# Patient Record
Sex: MALE | Race: WHITE | NOT HISPANIC OR LATINO | Employment: UNEMPLOYED | URBAN - METROPOLITAN AREA
[De-identification: names, ages, dates, MRNs, and addresses within clinical notes are randomized per-mention and may not be internally consistent; named-entity substitution may affect disease eponyms.]

---

## 2024-01-23 ENCOUNTER — TRANSFERRED RECORDS (OUTPATIENT)
Dept: HEALTH INFORMATION MANAGEMENT | Facility: CLINIC | Age: 39
End: 2024-01-23

## 2024-02-06 ENCOUNTER — MEDICAL CORRESPONDENCE (OUTPATIENT)
Dept: HEALTH INFORMATION MANAGEMENT | Facility: CLINIC | Age: 39
End: 2024-02-06
Payer: COMMERCIAL

## 2024-02-07 ENCOUNTER — CARE COORDINATION (OUTPATIENT)
Dept: GASTROENTEROLOGY | Facility: CLINIC | Age: 39
End: 2024-02-07
Payer: COMMERCIAL

## 2024-02-07 ENCOUNTER — TRANSCRIBE ORDERS (OUTPATIENT)
Dept: OTHER | Age: 39
End: 2024-02-07

## 2024-02-07 ENCOUNTER — DOCUMENTATION ONLY (OUTPATIENT)
Dept: GASTROENTEROLOGY | Facility: CLINIC | Age: 39
End: 2024-02-07
Payer: COMMERCIAL

## 2024-02-07 DIAGNOSIS — K86.1 CHRONIC RECURRENT PANCREATITIS (H): Primary | ICD-10-CM

## 2024-02-07 NOTE — PROGRESS NOTES
Called to request images be pushed to [employer] PACS.    Images Requested:  -- CT ABDOMEN PELVIS WO (01/23/2024 3:26 PM CST)  -- CT ABDOMEN PELVIS WO (01/03/2024 7:43 AM CST)  -- MR ABDOMEN MRCP WO (09/08/2023 12:22 PM CDT)    Facility Information:  [facility]   [address] [place] [place] [facility]", [place] [address]   Phone #: [phone]       SK

## 2024-02-07 NOTE — PROGRESS NOTES
[facility] referral review:    Patient with history of acute on chronic pancreatitis    ERCP's mentioned in ER note from 1-29-24 but none seen per records since 2020    Ok for clinic with Dr. Lay      ER visit 1-29-24  hypertension, recurrent pancreatitis, pancreatic pseudocyst, depression, anxiety. Patient has had multiple recent hospitalization for epigastric pain, recurrent pancreatitis. History of gallstone pancreatitis that started in 2019, complicated with pancreatic pseudocyst which she was drained in 2020. Due to episodes of recurrent pancreatitis underwent cystogastrostomy and stent placement and July 2023 followed by ERCP for stent removal. Chart review suggested patient did have fairly significant strictures during ERCP. This is third hospitalization in January 2024. Patient was admitted on 1/3/2024 and subsequently on 1/23/2024 and discharged to home on 1/25/2024. Patient reported that he was doing well until today when he had some mac & cheese after which he started having severe epigastric abdominal pain, bloating sensation. Denies any fever or chills. Denies alcohol use.     Lipase was minimally elevated. GI was consulted though recommended medical management and no further imaging at this point. The patient was treated conservatively and his diet was advanced successfully. The patient has an appointment with an outpatient Pain management service. He was discharged with 5 tabs of 5mg Oxycodone as he had run out. Follow up with primary physician in 3-4 days.       Per referring provider clinic note:  Patient presenting today for follow-up of chronic recurrent pancreatitis. I have placed a referral to the [facility] to see if he can get a second opinion for further interventions that may be helpful for him. I have strongly urged him to continue eating gentle foods and avoid foods that are high in excess carbohydrates or fats. Will plan to follow-up as needed for his symptoms. Pain  clinic consultation already in place. Continue increasing gabapentin as tolerated.    Component 01/30/24 01/23/24 01/03/24 06/12/23 04/26/23 03/08/23   LIPASE 13.0-60.0 unit(s)/L 82.0 High  191.0 High  163.0 High  27.0 41.0 47.5     Prior elevations lipase in 2019    CT A/p 1-23-24  1.  Previously seen pseudocyst involving the distal body/tail of the pancreas has decreased in size. However there are persistent inflammatory changes adjacent to the distal body and tail of the pancreas similar to most recent exam consistent with persistent or recurrent pancreatitis.   2.  Cholecystectomy.   3.  Tiny nonobstructing left renal stone.   4.  Colonic diverticulosis.   5.  Multiple stable varices left upper abdomen.     MRCP 9/8/23  Impression    1.  Interval decrease in the size of the cystic lesion in the pancreatic tail, which now measures up to 2.2 cm compared to prior 3.7 cm.  2.  Findings to suggest acute pancreatitis at this time.  3.  Unremarkable appearance of the pancreaticobiliary ducts.    EUS w/ cystgastrostomy 2019  Findings:       ENDOSCOPIC FINDING: :        Extrinsic compression on the stomach was found in the gastric fundus.        The examined duodenum was normal.        ENDOSONOGRAPHIC FINDING: :        Evidence of a previous cholecystectomy was identified        endosonographically.        An anechoic lesion suggestive of a pseudocyst was identified in the        pancreatic tail. It does not communicate with the pancreatic duct. The        lesion measured 100 mm by 100 mm in maximal cross-sectional diameter.        There was a single compartment without septae. The outer wall of the        lesion was thick. There was no associated mass. There was no internal        debris within the fluid-filled cavity. The decision was made to create a        cystogastrostomy using the AXIOS stent system. Once an appropriate        position in the stomach was identified, the common wall between the        stomach and  the cyst was interrogated utilizing color Doppler imaging to        identify interposed vessels. The stomach wall and the cyst were        punctured under endosonographic guidance. A wire was inserted into the        cyst under fluoroscopic guidance. The AXIOS stent and electrocautery        device was introduced through the working channel and advanced over the        guidewire. Current was applied to the cautery tip and then used to        increase the diameter of the stoma. The AXIOS device was advanced into        the cyst, and a 15 x 10 mm AXIOS stent was placed with the flanges in        close approximation to the walls of the cyst and the stomach through the        cystogastrostomy. The stent was successfully placed.     Impressions/Post-Op Diagnosis:        - Extrinsic compression in the gastric fundus.        - Normal examined duodenum.        - Evidence of a cholecystectomy.        - A 103 mm by 100 mm pseudocyst was seen in the area of pancreatic tail.        Tissue has not been obtained. However, the endosonographic appearance is        consistent with a pancreatic pseudocyst.        - Cystogastrostomy was performed with placement of a 15 x 10 mm AXIOS        LAMS.        - No specimens collected

## 2024-03-13 ENCOUNTER — TRANSFERRED RECORDS (OUTPATIENT)
Dept: HEALTH INFORMATION MANAGEMENT | Facility: CLINIC | Age: 39
End: 2024-03-13

## 2024-03-14 ENCOUNTER — TRANSFERRED RECORDS (OUTPATIENT)
Dept: HEALTH INFORMATION MANAGEMENT | Facility: CLINIC | Age: 39
End: 2024-03-14

## 2024-04-09 ENCOUNTER — TRANSFERRED RECORDS (OUTPATIENT)
Dept: HEALTH INFORMATION MANAGEMENT | Facility: CLINIC | Age: 39
End: 2024-04-09

## 2024-04-18 ENCOUNTER — TRANSFERRED RECORDS (OUTPATIENT)
Dept: HEALTH INFORMATION MANAGEMENT | Facility: CLINIC | Age: 39
End: 2024-04-18
Payer: COMMERCIAL

## 2024-05-08 ENCOUNTER — DOCUMENTATION ONLY (OUTPATIENT)
Dept: GASTROENTEROLOGY | Facility: CLINIC | Age: 39
End: 2024-05-08

## 2024-05-08 ENCOUNTER — TELEPHONE (OUTPATIENT)
Dept: GASTROENTEROLOGY | Facility: CLINIC | Age: 39
End: 2024-05-08

## 2024-05-08 ENCOUNTER — OFFICE VISIT (OUTPATIENT)
Dept: GASTROENTEROLOGY | Facility: CLINIC | Age: 39
End: 2024-05-08
Payer: COMMERCIAL

## 2024-05-08 VITALS
WEIGHT: 268 LBS | HEIGHT: 72 IN | OXYGEN SATURATION: 97 % | BODY MASS INDEX: 36.3 KG/M2 | SYSTOLIC BLOOD PRESSURE: 111 MMHG | DIASTOLIC BLOOD PRESSURE: 77 MMHG | HEART RATE: 75 BPM

## 2024-05-08 DIAGNOSIS — K86.1 CHRONIC RECURRENT PANCREATITIS (H): ICD-10-CM

## 2024-05-08 PROCEDURE — 99204 OFFICE O/P NEW MOD 45 MIN: CPT | Performed by: INTERNAL MEDICINE

## 2024-05-08 RX ORDER — CARVEDILOL 25 MG/1
50 TABLET ORAL 2 TIMES DAILY WITH MEALS
COMMUNITY
Start: 2022-06-27

## 2024-05-08 RX ORDER — PROCHLORPERAZINE 25 MG/1
SUPPOSITORY RECTAL
COMMUNITY
Start: 2023-06-21

## 2024-05-08 RX ORDER — MULTIVITAMIN,THER AND MINERALS
1 TABLET ORAL DAILY
COMMUNITY

## 2024-05-08 RX ORDER — POLYETHYLENE GLYCOL 3350 17 G/17G
17 POWDER, FOR SOLUTION ORAL DAILY PRN
COMMUNITY
Start: 2023-01-27

## 2024-05-08 RX ORDER — ONDANSETRON 4 MG/1
4 TABLET, ORALLY DISINTEGRATING ORAL EVERY 8 HOURS PRN
COMMUNITY
Start: 2023-06-29

## 2024-05-08 RX ORDER — ISOSORBIDE MONONITRATE 30 MG/1
90 TABLET, EXTENDED RELEASE ORAL AT BEDTIME
COMMUNITY
Start: 2022-06-27

## 2024-05-08 RX ORDER — AMLODIPINE BESYLATE 10 MG/1
1 TABLET ORAL DAILY
COMMUNITY
Start: 2022-06-27

## 2024-05-08 RX ORDER — MAGNESIUM HYDROXIDE/ALUMINUM HYDROXICE/SIMETHICONE 120; 1200; 1200 MG/30ML; MG/30ML; MG/30ML
15 SUSPENSION ORAL EVERY 4 HOURS PRN
COMMUNITY

## 2024-05-08 RX ORDER — PREGABALIN 150 MG/1
1 CAPSULE ORAL 3 TIMES DAILY
COMMUNITY
Start: 2024-04-29

## 2024-05-08 RX ORDER — PROCHLORPERAZINE 25 MG/1
SUPPOSITORY RECTAL
COMMUNITY
Start: 2023-06-27

## 2024-05-08 RX ORDER — DULOXETIN HYDROCHLORIDE 60 MG/1
60 CAPSULE, DELAYED RELEASE ORAL DAILY
COMMUNITY
Start: 2024-03-18

## 2024-05-08 RX ORDER — LIDOCAINE 4 G/G
1-3 PATCH TOPICAL
COMMUNITY

## 2024-05-08 RX ORDER — HYDRALAZINE HYDROCHLORIDE 100 MG/1
100 TABLET, FILM COATED ORAL 3 TIMES DAILY
COMMUNITY
Start: 2022-06-27

## 2024-05-08 RX ORDER — PANTOPRAZOLE SODIUM 40 MG/1
40 TABLET, DELAYED RELEASE ORAL 2 TIMES DAILY WITH MEALS
COMMUNITY
Start: 2024-04-24

## 2024-05-08 RX ORDER — ACETAMINOPHEN 500 MG
1000 TABLET ORAL EVERY 8 HOURS PRN
COMMUNITY
Start: 2023-01-26

## 2024-05-08 ASSESSMENT — PAIN SCALES - GENERAL: PAINLEVEL: MODERATE PAIN (4)

## 2024-05-08 NOTE — NURSING NOTE
Chief Complaint   Patient presents with    New Patient       Vitals:    05/08/24 0820   BP: 111/77   Pulse: 75   SpO2: 97%   Weight: 121.6 kg (268 lb)   Height: 1.829 m (6')       Body mass index is 36.35 kg/m .    Marielos Marx

## 2024-05-08 NOTE — PROGRESS NOTES
Per Yeimi GARY RN: Request records from [place]     Clinic Info: [facility]  [address], [place] [address]     Phone: [phone]   [facility] HIM Direct Fax: [phone]    Records requested on 5/8 at 1:00pm.     Marielos Marx

## 2024-05-08 NOTE — PROGRESS NOTES
Abiel is a very pleasant 39-year-old referred by Dr. Garcia for recurrent acute and chronic pancreatitis.  He is accompanied by his younger brother and his mother who are very invested in his care.  He has a long medical history including congestive heart failure diabetes CKD but had his first episode of acute pancreatitis related to gallstones in 2019.  That was complicated by walled off necrosis treated by [facility] with a lumen opposing medical stent also an ERCP do not have the details.  He has had multiple subsequent admissions for acute on chronic pancreatitis and a pseudocyst.  At last intervention Dr. Castillo attempted to traverse the stricture was able to get a 021 wire through it but no dilator which is typical of most commercially available dilators with tight strictures.  Since then the patient has had intractable pain that is on a good day is 4 out of 10 on a bad a 9-10 out of 10.  He is managed thoroughly medically by [facility] and is on neuromodulators Cymbalta Tylenol but no opioids.  At some point he had a fecal elastase but we cannot get the results but has never been tried on pancreatic enzymes.  He is on a very minimal diet and has not seen a dietitian he is slowly losing weight who is still morbidly obese at 268 pounds BMI 36.3.  He has nikolai colored and inconsistent stools that may or may not represent exocrine pancreatic insufficiency.  We do have his latest CT uploaded and its without contrast from January due to his CKD.  It does show fairly intact pancreas without calcifications or visible duct up to the tail which shows a small pseudocyst and/or resolved walled off necrosis.    On physical exam he appears in pain and is tender in the left upper abdomen and mid abdomen but not the right.    We spent 45 minutes in person and reviewing data and imaging.  He had a sentinel event of acute biliary pancreatitis with necrosis or pseudocyst not really clear that was drained and has had  some attempts at stent therapy trans papillary.  He now apparently has a high-grade stricture which may be related to the pancreatitis or to prior stents and a relatively normal duct.  In any rate there may be some component of duct obstruction contributing to his pain.  He also may have exocrine pancreatic insufficiency and has not had dietary consultation.  Therefore, recommend the following including a secretin MRCP to look at his duct morphology and  Fairly confident that if he has a stricture we can get an 018 wire and an angioplasty balloon through it to see if decompression helps his pain.  The that may or may not be effective however I do not think any surgery is likely to help him.  Distal pancreatectomy's for almost inevitably resulted in worsening diabetes a splenectomy and do not really chronic pain in addition to the risks of duct leak and other complications of the surgery.  He is not a candidate for total pancreatectomy.  If we will try him on enzymes regardless of his elastase get him to his diet our pancreas dietitian and then repeated in elastase for documentation.  After the MRCP I will follow-up with him and see what snacks.  If all fails we will refer him to interventional pain center      1) get results of elastase  2) trial ezymes Creon 24K 1 increase to 3 w meals  3) dietitian  4) secretin MRCP  5) repeat elastase here  6) follow-up 6 weeks          Component  Ref Range & Units 3 mo ago   HEMOGLOBIN A1C MONITORING (POCT)  <=6.4 % 5.5       7/2023  DISCHARGE DIAGNOSES:  Postprocedural ERCP/transpapillary drainage pain   History of recurrent pancreatitis     HOSPITAL COURSE:  37 y/o M with an recurrent pancreatitis, pancreatic pseudocyst requiring drainage in 2019 with improvement in symptoms, HTN, DM, CKD 3, GERD, recently hospitalized at [facility] 6/12 to 6/16 with severe abdominal pain, nausea, found to have increase in size pancreatic tail pseudocyst with adjacent inflammatory changes  of pancreatitis, treated with bowel rest, IVF, DC'd to have elective outpatient ERCP/transpapillary drainage with GI, underwent the procedure on 7/6, complicated by postprocedural pain.     Initial episode began in 2019 with gallstone pancreatitis, but had multiple recurrent hospitalizations since 12/22 for acute on chronic GI symptoms and eventually felt ERCP with transpapillary drainage may be helpful from his most recent hospitalization. On 7/6, underwent cystogastrostomy using an AXIOS and a coaxial double-pigtail stent with removal of pus and debris from pseudocyst. He will need ERCP in 2 weeks to remove the PD stent and the AXIOS. He continues to have intense postprocedural pain. Lipase was mildly elevated, felt it may be due to pancreatic manipulation during ERCP per GI.     His diet was gradually advanced and pain was eventually controlled. On day of DC, he did well with regular diet. His pain is much improved. He requested some oral pain medications to go home with, which was provided. No fevers, chills, dyspnea, nausea or vomiting. He is requesting be discharged and was sent home in stable condition. He agreed to follow-up with GI.    See below for secondary problems  # HTN  Is now better controlled with PTA amlodipine, Imdur, Coreg, hydralazine p.o.,    # DM  Hold PTA metformin. Insulin sliding scale     # CKD 3  Avoid renal insults      Past Medical History:   . Date   CHF (congestive heart failure) (HC)   Diabetes mellitus type 2 in obese (HC)   GERD (gastroesophageal reflux disease)   Lung nodule   finished f/u with outside provider   Non-ischemic cardiomyopathy (HC)   h/o reduced EF, recovered in follow up   KAYLA (obstructive sleep apnea)   Pancreatic pseudocyst   s/p stent placement   Secondary hyperparathyroidism (HC)     Past Surgical History:   . Laterality Date   ENDOSCOPIC ULTRASOUND 10/29/2019   [place]   GALLBLADDER SURGERY 2020   LAP CHOLECYSTECTOMY 07/16/2019   umbilical hernia repair also/  Dr. Hutchinson   [place] [facility] STENT PLACEMENT BILIARY/PANCREATIC DUCT 2019   UPPER ENDOSCOPIC ULTRASOUND W/ FNA 2019   Dr. Davis     Family History   Problem Relation Age of Onset   Depression Mother   Anxiety disorder Mother   Lung cancer Mother   Asthma Mother   COPD Mother   Fibromyalgia Father   Crohn's disease Brother   Rheumatic fever Brother     Social History     Tobacco Use   Smoking status: Former   Current packs/day: 0.00   Types: Cigarettes   Start date: 3/11/2005   Quit date: 3/11/2007   Years since quittin.0   Passive exposure: Never   Smokeless tobacco: Never   Vaping Use   Vaping status: Former   Substances: THC, CBD   Devices: Disposable, Pre-filled pod   Substance Use Topics   Alcohol use: No   Comment: Quit drinking in    Drug use: Yes   Types: Marijuana   Comment: Almost daily, patient has a medical marijuana card     Current Outpatient Medications   Medication Sig Dispense Refill   acetaminophen (TYLENOL EXTRA STRGTH) 500 mg tablet Take 2 Tablets (1,000 mg) by mouth four times daily. Max acetaminophen dose: 4000mg in 24 hrs. 0   albuterol HFA 90 mcg/actuation inhaler Inhale 2 Puffs by mouth every 4 hours if needed. 18 g 0   amitriptyline (ELAVIL) 25 mg tablet Take 25 mg by mouth at bedtime.   amLODIPine (NORVASC) 10 mg tablet Take 1 Tablet (10 mg) by mouth once daily. 90 Tablet 3   carvediloL (COREG) 25 mg tablet Take 2 Tablets (50 mg) by mouth two times daily with meals. 360 Tablet 3   DULoxetine (CYMBALTA) 60 mg Delayed-release capsule Take 1 Capsule (60 mg) by mouth once daily. 90 Capsule 3   hydrALAZINE (APRESOLINE) 100 mg tablet Take 1 Tablet (100 mg) by mouth three times daily. 270 Tablet 3   isosorbide mononitrate (IMDUR) 30 mg extended release tablet 24 Hour Take 3 Tablets (90 mg) by mouth once daily. 270 Tablet 3   lidocaine 4 % topical patch Apply 1-3 Patches on dry, clean, hairless skin once daily if needed for Pain. Apply to intact skin to cover most painful area for max  12hr per 24hr period.   metFORMIN (GLUCOPHAGE) 1,000 mg tablet Take 1 Tablet (1,000 mg) by mouth two times daily with meals. 180 Tablet 0   multivitamin,ther and minerals (MULTIVITAMIN-MINERALS THERAPEUTIC) tablet Take 1 tablet by mouth once daily.   ondansetron (ZOFRAN ODT) 4 mg disintegrating tablet Place 1 Tablet (4 mg) on the tongue every 6 hours if needed for Nausea/Vomiting. 10 Tablet 0   pantoprazole (PROTONIX) 40 mg delayed-release tablet Take 1 Tablet (40 mg) by mouth two times daily before meals. 90 Tablet 3   polyethylene glycol (MIRALAX; GLYCOLAX) 17 g packet Take 17 g by mouth or nasogastric tube once daily if needed for Constipation. 0   pregabalin (LYRICA) 100 mg capsule Take 1 Capsule (100 mg) by mouth three times daily. 90 Capsule 0     No current facility-administered medications for this visit.     Medications have been reviewed by me and are current to the best of my knowledge and ability.    Allergies   Allergen Reactions   Iodine Hives and Itching   Pt allergic to iodine/ shellfish , contrast dyes.   Trazodone Other - Describe In Comment Field   priapsim   Amoxicillin *Unknown - Childhood Rxn   Citrus And Derivatives Nausea Only   Contrast Dye [Iodinated Contrast Media] Renal Failure   Do not use contrast dyes due to kidney failure/ckd   Shell Fish [Shellfish Containing Products] GI Upset     ROS: The remainder of review of systems was negative.    EXAM:   There were no vitals filed for this visit.  GEN: awake and talkative; appears well-nourished, NAD  HEENT: Speech clear, hearing intact  RESP: Breathing non labored    QOL score: 8/10    TREATMENTS TRIED: gabapentin, tylenol, cymbalta, elavil, surgery, lyrica    ASSESSMENT AND PLAN:  ICD-10-CM   1. Chronic recurrent pancreatitis (HC) K86.1 pregabalin (LYRICA) 100 mg capsule     2. Chronic abdominal pain R10.9 pregabalin (LYRICA) 100 mg capsule   G89.29     3. Neuropathic pain M79.2 pregabalin (LYRICA) 100 mg capsule     4. Medication  management Z79.899     5. Chronic pain syndrome G89.4     Yeimi Goodwin, RN   Registered Nurse     Progress Notes  Signed     Encounter Date: 2/7/2024       [facility] referral review:     Patient with history of acute on chronic pancreatitis     ERCP's mentioned in ER note from 1-29-24 but none seen per records since 2020     Ok for clinic with Dr. Lay        ER visit 1-29-24  hypertension, recurrent pancreatitis, pancreatic pseudocyst, depression, anxiety. Patient has had multiple recent hospitalization for epigastric pain, recurrent pancreatitis. History of gallstone pancreatitis that started in 2019, complicated with pancreatic pseudocyst which she was drained in 2020. Due to episodes of recurrent pancreatitis underwent cystogastrostomy and stent placement and July 2023 followed by ERCP for stent removal. Chart review suggested patient did have fairly significant strictures during ERCP. This is third hospitalization in January 2024. Patient was admitted on 1/3/2024 and subsequently on 1/23/2024 and discharged to home on 1/25/2024. Patient reported that he was doing well until today when he had some mac & cheese after which he started having severe epigastric abdominal pain, bloating sensation. Denies any fever or chills. Denies alcohol use.     Lipase was minimally elevated. GI was consulted though recommended medical management and no further imaging at this point. The patient was treated conservatively and his diet was advanced successfully. The patient has an appointment with an outpatient Pain management service. He was discharged with 5 tabs of 5mg Oxycodone as he had run out. Follow up with primary physician in 3-4 days.        Per referring provider clinic note:  Patient presenting today for follow-up of chronic recurrent pancreatitis. I have placed a referral to the [facility] to see if he can get a second opinion for further interventions that may be helpful for him. I have strongly  urged him to continue eating gentle foods and avoid foods that are high in excess carbohydrates or fats. Will plan to follow-up as needed for his symptoms. Pain clinic consultation already in place. Continue increasing gabapentin as tolerated.     Component 01/30/24 01/23/24 01/03/24 06/12/23 04/26/23 03/08/23   LIPASE 13.0-60.0 unit(s)/L 82.0 High  191.0 High  163.0 High  27.0 41.0 47.5     Prior elevations lipase in 2019     CT A/p 1-23-24  1.  Previously seen pseudocyst involving the distal body/tail of the pancreas has decreased in size. However there are persistent inflammatory changes adjacent to the distal body and tail of the pancreas similar to most recent exam consistent with persistent or recurrent pancreatitis.   2.  Cholecystectomy.   3.  Tiny nonobstructing left renal stone.   4.  Colonic diverticulosis.   5.  Multiple stable varices left upper abdomen.      MRCP 9/8/23  Impression     1.  Interval decrease in the size of the cystic lesion in the pancreatic tail, which now measures up to 2.2 cm compared to prior 3.7 cm.  2.  Findings to suggest acute pancreatitis at this time.  3.  Unremarkable appearance of the pancreaticobiliary ducts.    GI Symptoms or Concerns38-year-old gentleman with a past medical history significant for acute recurrent pancreatitis now been regular follow-up. He initially developed a pseudocyst in 2019 for which on she underwent endoscopic transmural drainage. This has resolved and he did well until earlier this year when he developed recurrent episodes of pancreatitis requiring virtually constant hospitalizations. Imaging showed a fluid collection in the tail of the pancreas which was relatively unchanged over multiple imaging studies. Conservative therapies fail then ultimately patient underwent endoscopic ultrasound guided drainage of the of the fluid collection. On subsequently, the patient has felt much better with significant improvement in pain and no further  hospitalizations since June of this year. He was hospitalized for approximately 2 days after that procedure. In addition, an ERCP was performed to evaluate for pancreatic duct leak. There was no extravasation of contrast from the head and distal body of the pancreas however the more proximal duct could not be filled. It did appear to be slightly dilated endoscopic ultrasound however advanced pancreatic duct access was not pursued as the patient&#39;s symptoms were felt to be most likely secondary to the fluid collection. After endoscopic transmural drainage of the fluid collection the patient felt better as mentioned above. He presented for removal of the stent in July of this year. At that point repeat ERCP was performed to attempt to traverse the stricture and although a guidewire could be advanced further into the pancreatic duct only minimal contrast could be injected and no device could be advanced across the stricture. Since then the patient states that overall he is improved. He has good days and bad days but overall is better. He has not been able to go back to work yet due to significant functional impairment during bad days. He has been feeling somewhat depressed. He has been having financial difficulties due to his multiple hospitalizations and procedures. Appetite has been relatively stable he denies any nausea or emesis other than if his pain becomes significantly worse. His bowel movements have been irregular. He is able to tolerate a fairly regular diet however.     Anatomical Region Laterality Modality   Abdomen -- Magnetic Resonance   LIVER -- --   PANCREAS -- --   KIDNEYS -- --   AORTA -- --     Impression    1.  Interval decrease in the size of the cystic lesion in the pancreatic tail, which now measures up to 2.2 cm compared to prior 3.7 cm.  2.  Findings to suggest acute pancreatitis at this time.  3.  Unremarkable appearance of the pancreaticobiliary ducts.  Narrative    For Patients: As a  result of the 21st Century Cures Act, medical imaging exams and procedure reports are released immediately into your electronic medical record. You may view this report before your referring provider. If you have questions, please contact your health care provider.    EXAM: MR ABDOMEN MRCP WO  LOCATION: [facility]  DATE: 9/8/2023    INDICATION: Recurrent Pancreatitis  COMPARISON: Multiple priors with the most recent dated 06/12/2023  TECHNIQUE: Routine MR liver/pancreas protocol including axial and coronal MRCP sequences. 2D and 3D reconstruction performed by MR technologist including MIP reconstruction and slab cholangiograms. If performed with contrast, additional dynamic T1 post IV contrast images.  CONTRAST: None.    FINDINGS:    MRCP: Absent gallbladder. No intrahepatic or extrahepatic biliary dilatation, stricture, focal filling defect. Pancreatic duct is decompressed and unremarkable.    LIVER: No morphologic changes of hepatic steatosis, iron deposition, or cirrhosis. Accounting for the noncontrast technique, no focal hepatic mass is identified.    PANCREAS: The unilocular cystic lesion in the pancreatic tail has decreased in size now measuring 2.2 x 1.8 cm, previously 3.7 x 3.7 cm (series 9, image 15). The lesion appears circumscribed and without thick internal septations or invasion of adjacent structures. No acute appearing peripancreatic inflammation. Accounting for the noncontrast technique, no solid pancreatic mass is identified elsewhere.    ADDITIONAL FINDINGS:  Clear visualized lung bases.    Unremarkable noncontrast appearance of the spleen and adrenal glands. There are several subcentimeter bilateral renal cysts, which are presumably benign and no follow-up is recommended. No large contour deforming renal mass. No hydronephrosis. No dilated loops of small bowel to suggest an obstruction.    Normal caliber abdominal aorta. Multiple left upper quadrant collaterals are identified, which may be  due to prior occlusion of the splenic vein. The main portal venous flow void is preserved on the T2-weighted imaging. No intra-abdominal ascites. No intra-abdominal lymphadenopathy by size criteria.

## 2024-05-08 NOTE — PATIENT INSTRUCTIONS
Follow up:    Dr. Lay has outlined the following steps after your recent clinic visit:    1) we will get labs from [facility] related to Fecal elastase    2) trial ezymes Creon 24K 1 increase to 3 w meal    3) dietitian visit    4) secretin MRCP    6) follow-up 6 weeks    Please call with any questions or concerns regarding your clinic visit today.    It is a pleasure being involved in your health care.    Contacts post-consultation depending on your need:    Schedule Clinic Appointments            [phone] # 1   M-F 7:30 - 5 pm    Yeimi Goodwin, RN Care Coordinator (Dr. Menendez/Dr. Lay)  [phone]    Paula Hernandez, RN Care Coordinator (Dr. Uribe)   [phone]    Alison Del Angel, RN Care Coordinator (Dr. Villafuerte/Dr. Hodge)  [phone]    [facility]  (all MDs)   [phone]      For urgent/emergent questions after business hours, you may reach the on-call GI Fellow by contacting the [facility]  at [phone].    How do I schedule labs, imaging studies, or procedures that were ordered in clinic today?     Labs: To schedule lab appointment at the [facility], use my chart or call [phone]. If you have a [facility] lab closer to home where you are regularly seen you can give them a call.     Procedures: If a colonoscopy, upper endoscopy, breath test, esophageal manometry, or pH impedence was ordered today, our endoscopy team will call you to schedule this. If you have not heard from our endoscopy team within a week, please call [phone] to schedule.     Imaging Studies: If you were scheduled for a CT scan, X-ray, MRI, ultrasound, HIDA scan or other imaging study, please call [phone] to have this scheduled.     Referral: If a referral to another specialty was ordered, expect a phone call or follow instructions above. If you have not heard from anyone regarding your referral in a week, please call our clinic to check the  status.     How to I schedule a follow-up visit?  If you did not schedule a follow-up visit today, please call [phone] to schedule a follow-up office visit.

## 2024-05-08 NOTE — LETTER
5/8/2024         RE: Abiel Weeks  [address]  [place] [address]        Dear Colleague,    Thank you for referring your patient, Abiel Weeks, to the [facility] [place]. Please see a copy of my visit note below.      Abiel is a very pleasant 39-year-old referred by Dr. Garcia for recurrent acute and chronic pancreatitis.  He is accompanied by his younger brother and his mother who are very invested in his care.  He has a long medical history including congestive heart failure diabetes CKD but had his first episode of acute pancreatitis related to gallstones in 2019.  That was complicated by walled off necrosis treated by [facility] with a lumen opposing medical stent also an ERCP do not have the details.  He has had multiple subsequent admissions for acute on chronic pancreatitis and a pseudocyst.  At last intervention Dr. Castillo attempted to traverse the stricture was able to get a 021 wire through it but no dilator which is typical of most commercially available dilators with tight strictures.  Since then the patient has had intractable pain that is on a good day is 4 out of 10 on a bad a 9-10 out of 10.  He is managed thoroughly medically by [facility] and is on neuromodulators Cymbalta Tylenol but no opioids.  At some point he had a fecal elastase but we cannot get the results but has never been tried on pancreatic enzymes.  He is on a very minimal diet and has not seen a dietitian he is slowly losing weight who is still morbidly obese at 268 pounds BMI 36.3.  He has nikolai colored and inconsistent stools that may or may not represent exocrine pancreatic insufficiency.  We do have his latest CT uploaded and its without contrast from January due to his CKD.  It does show fairly intact pancreas without calcifications or visible duct up to the tail which shows a small pseudocyst and/or resolved walled off necrosis.    On physical exam he  appears in pain and is tender in the left upper abdomen and mid abdomen but not the right.    We spent 45 minutes in person and reviewing data and imaging.  He had a sentinel event of acute biliary pancreatitis with necrosis or pseudocyst not really clear that was drained and has had some attempts at stent therapy trans papillary.  He now apparently has a high-grade stricture which may be related to the pancreatitis or to prior stents and a relatively normal duct.  In any rate there may be some component of duct obstruction contributing to his pain.  He also may have exocrine pancreatic insufficiency and has not had dietary consultation.  Therefore, recommend the following including a secretin MRCP to look at his duct morphology and  Fairly confident that if he has a stricture we can get an 018 wire and an angioplasty balloon through it to see if decompression helps his pain.  The that may or may not be effective however I do not think any surgery is likely to help him.  Distal pancreatectomy's for almost inevitably resulted in worsening diabetes a splenectomy and do not really chronic pain in addition to the risks of duct leak and other complications of the surgery.  He is not a candidate for total pancreatectomy.  If we will try him on enzymes regardless of his elastase get him to his diet our pancreas dietitian and then repeated in elastase for documentation.  After the MRCP I will follow-up with him and see what snacks.  If all fails we will refer him to interventional pain center      1) get results of elastase  2) trial ezymes Creon 24K 1 increase to 3 w meals  3) dietitian  4) secretin MRCP  5) repeat elastase here  6) follow-up 6 weeks          Component  Ref Range & Units 3 mo ago   HEMOGLOBIN A1C MONITORING (POCT)  <=6.4 % 5.5       7/2023  DISCHARGE DIAGNOSES:  Postprocedural ERCP/transpapillary drainage pain   History of recurrent pancreatitis     HOSPITAL COURSE:  37 y/o M with an recurrent  pancreatitis, pancreatic pseudocyst requiring drainage in 2019 with improvement in symptoms, HTN, DM, CKD 3, GERD, recently hospitalized at [facility] 6/12 to 6/16 with severe abdominal pain, nausea, found to have increase in size pancreatic tail pseudocyst with adjacent inflammatory changes of pancreatitis, treated with bowel rest, IVF, DC'd to have elective outpatient ERCP/transpapillary drainage with GI, underwent the procedure on 7/6, complicated by postprocedural pain.     Initial episode began in 2019 with gallstone pancreatitis, but had multiple recurrent hospitalizations since 12/22 for acute on chronic GI symptoms and eventually felt ERCP with transpapillary drainage may be helpful from his most recent hospitalization. On 7/6, underwent cystogastrostomy using an AXIOS and a coaxial double-pigtail stent with removal of pus and debris from pseudocyst. He will need ERCP in 2 weeks to remove the PD stent and the AXIOS. He continues to have intense postprocedural pain. Lipase was mildly elevated, felt it may be due to pancreatic manipulation during ERCP per GI.     His diet was gradually advanced and pain was eventually controlled. On day of DC, he did well with regular diet. His pain is much improved. He requested some oral pain medications to go home with, which was provided. No fevers, chills, dyspnea, nausea or vomiting. He is requesting be discharged and was sent home in stable condition. He agreed to follow-up with GI.    See below for secondary problems  # HTN  Is now better controlled with PTA amlodipine, Imdur, Coreg, hydralazine p.o.,    # DM  Hold PTA metformin. Insulin sliding scale     # CKD 3  Avoid renal insults      Past Medical History:   . Date   CHF (congestive heart failure) (HC)   Diabetes mellitus type 2 in obese (HC)   GERD (gastroesophageal reflux disease)   Lung nodule   finished f/u with outside provider   Non-ischemic cardiomyopathy (HC)   h/o reduced EF, recovered in follow up    KAYLA (obstructive sleep apnea)   Pancreatic pseudocyst   s/p stent placement   Secondary hyperparathyroidism (HC)     Past Surgical History:   . Laterality Date   ENDOSCOPIC ULTRASOUND 10/29/2019   [facility]   GALLBLADDER SURGERY    LAP CHOLECYSTECTOMY 2019   umbilical hernia repair also/ Dr. Hutchinson   DE ERCP STENT PLACEMENT BILIARY/PANCREATIC DUCT    UPPER ENDOSCOPIC ULTRASOUND W/ FNA 2019   Dr. Davis     Family History   Problem Relation Age of Onset   Depression Mother   Anxiety disorder Mother   Lung cancer Mother   Asthma Mother   COPD Mother   Fibromyalgia Father   Crohn's disease Brother   Rheumatic fever Brother     Social History     Tobacco Use   Smoking status: Former   Current packs/day: 0.00   Types: Cigarettes   Start date: 3/11/2005   Quit date: 3/11/2007   Years since quittin.0   Passive exposure: Never   Smokeless tobacco: Never   Vaping Use   Vaping status: Former   Substances: THC, CBD   Devices: Disposable, Pre-filled pod   Substance Use Topics   Alcohol use: No   Comment: Quit drinking in    Drug use: Yes   Types: Marijuana   Comment: Almost daily, patient has a medical marijuana card     Current Outpatient Medications   Medication Sig Dispense Refill   acetaminophen (TYLENOL EXTRA STRGTH) 500 mg tablet Take 2 Tablets (1,000 mg) by mouth four times daily. Max acetaminophen dose: 4000mg in 24 hrs. 0   albuterol HFA 90 mcg/actuation inhaler Inhale 2 Puffs by mouth every 4 hours if needed. 18 g 0   amitriptyline (ELAVIL) 25 mg tablet Take 25 mg by mouth at bedtime.   amLODIPine (NORVASC) 10 mg tablet Take 1 Tablet (10 mg) by mouth once daily. 90 Tablet 3   carvediloL (COREG) 25 mg tablet Take 2 Tablets (50 mg) by mouth two times daily with meals. 360 Tablet 3   DULoxetine (CYMBALTA) 60 mg Delayed-release capsule Take 1 Capsule (60 mg) by mouth once daily. 90 Capsule 3   hydrALAZINE (APRESOLINE) 100 mg tablet Take 1 Tablet (100 mg) by mouth three times daily. 270  Tablet 3   isosorbide mononitrate (IMDUR) 30 mg extended release tablet 24 Hour Take 3 Tablets (90 mg) by mouth once daily. 270 Tablet 3   lidocaine 4 % topical patch Apply 1-3 Patches on dry, clean, hairless skin once daily if needed for Pain. Apply to intact skin to cover most painful area for max 12hr per 24hr period.   metFORMIN (GLUCOPHAGE) 1,000 mg tablet Take 1 Tablet (1,000 mg) by mouth two times daily with meals. 180 Tablet 0   multivitamin,ther and minerals (MULTIVITAMIN-MINERALS THERAPEUTIC) tablet Take 1 tablet by mouth once daily.   ondansetron (ZOFRAN ODT) 4 mg disintegrating tablet Place 1 Tablet (4 mg) on the tongue every 6 hours if needed for Nausea/Vomiting. 10 Tablet 0   pantoprazole (PROTONIX) 40 mg delayed-release tablet Take 1 Tablet (40 mg) by mouth two times daily before meals. 90 Tablet 3   polyethylene glycol (MIRALAX; GLYCOLAX) 17 g packet Take 17 g by mouth or nasogastric tube once daily if needed for Constipation. 0   pregabalin (LYRICA) 100 mg capsule Take 1 Capsule (100 mg) by mouth three times daily. 90 Capsule 0     No current facility-administered medications for this visit.     Medications have been reviewed by me and are current to the best of my knowledge and ability.    Allergies   Allergen Reactions   Iodine Hives and Itching   Pt allergic to iodine/ shellfish , contrast dyes.   Trazodone Other - Describe In Comment Field   priapsim   Amoxicillin *Unknown - Childhood Rxn   Citrus And Derivatives Nausea Only   Contrast Dye [Iodinated Contrast Media] Renal Failure   Do not use contrast dyes due to kidney failure/ckd   Shell Fish [Shellfish Containing Products] GI Upset     ROS: The remainder of review of systems was negative.    EXAM:   There were no vitals filed for this visit.  GEN: awake and talkative; appears well-nourished, NAD  HEENT: Speech clear, hearing intact  RESP: Breathing non labored    QOL score: 8/10    TREATMENTS TRIED: gabapentin, tylenol, cymbalta, elavil,  surgery, lyrica    ASSESSMENT AND PLAN:  ICD-10-CM   1. Chronic recurrent pancreatitis (HC) K86.1 pregabalin (LYRICA) 100 mg capsule     2. Chronic abdominal pain R10.9 pregabalin (LYRICA) 100 mg capsule   G89.29     3. Neuropathic pain M79.2 pregabalin (LYRICA) 100 mg capsule     4. Medication management Z79.899     5. Chronic pain syndrome G89.4     Yeimi Goodwin, RN   Registered Nurse     Progress Notes  Signed     Encounter Date: 2/7/2024       RN[facility] referral review:     Patient with history of acute on chronic pancreatitis     ERCP's mentioned in ER note from 1-29-24 but none seen per records since 2020     Ok for clinic with Dr. Lay        ER visit 1-29-24  hypertension, recurrent pancreatitis, pancreatic pseudocyst, depression, anxiety. Patient has had multiple recent hospitalization for epigastric pain, recurrent pancreatitis. History of gallstone pancreatitis that started in 2019, complicated with pancreatic pseudocyst which she was drained in 2020. Due to episodes of recurrent pancreatitis underwent cystogastrostomy and stent placement and July 2023 followed by ERCP for stent removal. Chart review suggested patient did have fairly significant strictures during ERCP. This is third hospitalization in January 2024. Patient was admitted on 1/3/2024 and subsequently on 1/23/2024 and discharged to home on 1/25/2024. Patient reported that he was doing well until today when he had some mac & cheese after which he started having severe epigastric abdominal pain, bloating sensation. Denies any fever or chills. Denies alcohol use.     Lipase was minimally elevated. GI was consulted though recommended medical management and no further imaging at this point. The patient was treated conservatively and his diet was advanced successfully. The patient has an appointment with an outpatient Pain management service. He was discharged with 5 tabs of 5mg Oxycodone as he had run out. Follow up with primary physician in 3-4  days.        Per referring provider clinic note:  Patient presenting today for follow-up of chronic recurrent pancreatitis. I have placed a referral to the [facility] to see if he can get a second opinion for further interventions that may be helpful for him. I have strongly urged him to continue eating gentle foods and avoid foods that are high in excess carbohydrates or fats. Will plan to follow-up as needed for his symptoms. Pain clinic consultation already in place. Continue increasing gabapentin as tolerated.     Component 01/30/24 01/23/24 01/03/24 06/12/23 04/26/23 03/08/23   LIPASE 13.0-60.0 unit(s)/L 82.0 High  191.0 High  163.0 High  27.0 41.0 47.5     Prior elevations lipase in 2019     CT A/p 1-23-24  1.  Previously seen pseudocyst involving the distal body/tail of the pancreas has decreased in size. However there are persistent inflammatory changes adjacent to the distal body and tail of the pancreas similar to most recent exam consistent with persistent or recurrent pancreatitis.   2.  Cholecystectomy.   3.  Tiny nonobstructing left renal stone.   4.  Colonic diverticulosis.   5.  Multiple stable varices left upper abdomen.      MRCP 9/8/23  Impression     1.  Interval decrease in the size of the cystic lesion in the pancreatic tail, which now measures up to 2.2 cm compared to prior 3.7 cm.  2.  Findings to suggest acute pancreatitis at this time.  3.  Unremarkable appearance of the pancreaticobiliary ducts.    GI Symptoms or Concerns38-year-old gentleman with a past medical history significant for acute recurrent pancreatitis now been regular follow-up. He initially developed a pseudocyst in 2019 for which on she underwent endoscopic transmural drainage. This has resolved and he did well until earlier this year when he developed recurrent episodes of pancreatitis requiring virtually constant hospitalizations. Imaging showed a fluid collection in the tail of the pancreas which was  relatively unchanged over multiple imaging studies. Conservative therapies fail then ultimately patient underwent endoscopic ultrasound guided drainage of the of the fluid collection. On subsequently, the patient has felt much better with significant improvement in pain and no further hospitalizations since June of this year. He was hospitalized for approximately 2 days after that procedure. In addition, an ERCP was performed to evaluate for pancreatic duct leak. There was no extravasation of contrast from the head and distal body of the pancreas however the more proximal duct could not be filled. It did appear to be slightly dilated endoscopic ultrasound however advanced pancreatic duct access was not pursued as the patient&#39;s symptoms were felt to be most likely secondary to the fluid collection. After endoscopic transmural drainage of the fluid collection the patient felt better as mentioned above. He presented for removal of the stent in July of this year. At that point repeat ERCP was performed to attempt to traverse the stricture and although a guidewire could be advanced further into the pancreatic duct only minimal contrast could be injected and no device could be advanced across the stricture. Since then the patient states that overall he is improved. He has good days and bad days but overall is better. He has not been able to go back to work yet due to significant functional impairment during bad days. He has been feeling somewhat depressed. He has been having financial difficulties due to his multiple hospitalizations and procedures. Appetite has been relatively stable he denies any nausea or emesis other than if his pain becomes significantly worse. His bowel movements have been irregular. He is able to tolerate a fairly regular diet however.     Anatomical Region Laterality Modality   Abdomen -- Magnetic Resonance   LIVER -- --   PANCREAS -- --   KIDNEYS -- --   AORTA -- --     Impression    1.   Interval decrease in the size of the cystic lesion in the pancreatic tail, which now measures up to 2.2 cm compared to prior 3.7 cm.  2.  Findings to suggest acute pancreatitis at this time.  3.  Unremarkable appearance of the pancreaticobiliary ducts.  Narrative    For Patients: As a result of the 21st Century Cures Act, medical imaging exams and procedure reports are released immediately into your electronic medical record. You may view this report before your referring provider. If you have questions, please contact your health care provider.    EXAM: MR ABDOMEN MRCP WO  LOCATION: [facility]  DATE: 9/8/2023    INDICATION: Recurrent Pancreatitis  COMPARISON: Multiple priors with the most recent dated 06/12/2023  TECHNIQUE: Routine MR liver/pancreas protocol including axial and coronal MRCP sequences. 2D and 3D reconstruction performed by MR technologist including MIP reconstruction and slab cholangiograms. If performed with contrast, additional dynamic T1 post IV contrast images.  CONTRAST: None.    FINDINGS:    MRCP: Absent gallbladder. No intrahepatic or extrahepatic biliary dilatation, stricture, focal filling defect. Pancreatic duct is decompressed and unremarkable.    LIVER: No morphologic changes of hepatic steatosis, iron deposition, or cirrhosis. Accounting for the noncontrast technique, no focal hepatic mass is identified.    PANCREAS: The unilocular cystic lesion in the pancreatic tail has decreased in size now measuring 2.2 x 1.8 cm, previously 3.7 x 3.7 cm (series 9, image 15). The lesion appears circumscribed and without thick internal septations or invasion of adjacent structures. No acute appearing peripancreatic inflammation. Accounting for the noncontrast technique, no solid pancreatic mass is identified elsewhere.    ADDITIONAL FINDINGS:  Clear visualized lung bases.    Unremarkable noncontrast appearance of the spleen and adrenal glands. There are several subcentimeter bilateral renal  cysts, which are presumably benign and no follow-up is recommended. No large contour deforming renal mass. No hydronephrosis. No dilated loops of small bowel to suggest an obstruction.    Normal caliber abdominal aorta. Multiple left upper quadrant collaterals are identified, which may be due to prior occlusion of the splenic vein. The main portal venous flow void is preserved on the T2-weighted imaging. No intra-abdominal ascites. No intra-abdominal lymphadenopathy by size criteria.                 Again, thank you for allowing me to participate in the care of your patient.      Sincerely,    Simon Lay MD

## 2024-05-08 NOTE — TELEPHONE ENCOUNTER
Patient confirmed scheduled appointment:  Date: 6/5/24  Time: 8:30 am  Visit type: NEW GI NUTRITION  Provider: Danya Vila  Location: virtual  Testing/imaging: n/a  Additional notes: Pt confirmed will be in [place] for appt

## 2024-05-08 NOTE — TELEPHONE ENCOUNTER
Patient confirmed scheduled appointment:  Date: 5/30/24  Time: 7:45 am  Visit type: imaging  Provider: Dr Simon Lay  Location: [address] [place] [address]  Testing/imaging: MR abdomen MRCP  Additional notes: n/a

## 2024-05-09 ENCOUNTER — TELEPHONE (OUTPATIENT)
Dept: GASTROENTEROLOGY | Facility: CLINIC | Age: 39
End: 2024-05-09
Payer: COMMERCIAL

## 2024-05-09 NOTE — TELEPHONE ENCOUNTER
----- Message from Yeimi Goodwin RN sent at 5/9/2024 10:49 AM CDT -----  Regarding: FW: follow up  Hi team    Can you please reach out to [facility] to get records for this patient? Specifically looking for a recent fecal elastase, but will take all records they have.    Thank you!    Yeimi Goodwin RN Care Coordinator  ----- Message -----  From: Yeimi Goodwin RN  Sent: 5/9/2024  12:00 AM CDT  To: Yeimi Goodwin RN  Subject: follow up                                        Check to see when [facility] scheduled- schedule clinic after that    [facility] records, need fecal elastase (done 1 month ago)

## 2024-05-25 ENCOUNTER — HEALTH MAINTENANCE LETTER (OUTPATIENT)
Age: 39
End: 2024-05-25

## 2024-05-30 ENCOUNTER — HOSPITAL ENCOUNTER (OUTPATIENT)
Dept: MRI IMAGING | Facility: CLINIC | Age: 39
Discharge: HOME | End: 2024-05-30
Attending: INTERNAL MEDICINE | Admitting: INTERNAL MEDICINE
Payer: COMMERCIAL

## 2024-05-30 DIAGNOSIS — K86.1 CHRONIC RECURRENT PANCREATITIS (H): ICD-10-CM

## 2024-05-30 PROCEDURE — 250N000011 HC RX IP 250 OP 636: Mod: JZ | Performed by: INTERNAL MEDICINE

## 2024-05-30 PROCEDURE — A9585 GADOBUTROL INJECTION: HCPCS | Performed by: STUDENT IN AN ORGANIZED HEALTH CARE EDUCATION/TRAINING PROGRAM

## 2024-05-30 PROCEDURE — 74183 MRI ABD W/O CNTR FLWD CNTR: CPT | Mod: 26 | Performed by: RADIOLOGY

## 2024-05-30 PROCEDURE — 74183 MRI ABD W/O CNTR FLWD CNTR: CPT

## 2024-05-30 PROCEDURE — 250N000009 HC RX 250: Performed by: INTERNAL MEDICINE

## 2024-05-30 PROCEDURE — 255N000002 HC RX 255 OP 636: Performed by: STUDENT IN AN ORGANIZED HEALTH CARE EDUCATION/TRAINING PROGRAM

## 2024-05-30 RX ORDER — GADOBUTROL 604.72 MG/ML
10 INJECTION INTRAVENOUS ONCE
Status: COMPLETED | OUTPATIENT
Start: 2024-05-30 | End: 2024-05-30

## 2024-05-30 RX ADMIN — HUMAN SECRETIN 15.8 MCG: 16 INJECTION, POWDER, LYOPHILIZED, FOR SOLUTION INTRAVENOUS at 07:47

## 2024-05-30 RX ADMIN — HUMAN SECRETIN 0.2 MCG: 16 INJECTION, POWDER, LYOPHILIZED, FOR SOLUTION INTRAVENOUS at 07:45

## 2024-05-30 RX ADMIN — GADOBUTROL 12 ML: 604.72 INJECTION INTRAVENOUS at 07:56

## 2024-06-05 ENCOUNTER — VIRTUAL VISIT (OUTPATIENT)
Dept: GASTROENTEROLOGY | Facility: CLINIC | Age: 39
End: 2024-06-05
Payer: COMMERCIAL

## 2024-06-05 VITALS — BODY MASS INDEX: 35.21 KG/M2 | HEIGHT: 72 IN | WEIGHT: 260 LBS

## 2024-06-05 DIAGNOSIS — K86.1 CHRONIC RECURRENT PANCREATITIS (H): Primary | ICD-10-CM

## 2024-06-05 DIAGNOSIS — E11.9 DIABETES MELLITUS, TYPE 2 (H): ICD-10-CM

## 2024-06-05 PROCEDURE — 99207 PR NO CHARGE LOS: CPT | Mod: 95 | Performed by: DIETITIAN, REGISTERED

## 2024-06-05 PROCEDURE — 97802 MEDICAL NUTRITION INDIV IN: CPT | Mod: 95 | Performed by: DIETITIAN, REGISTERED

## 2024-06-05 ASSESSMENT — PAIN SCALES - GENERAL: PAINLEVEL: MODERATE PAIN (4)

## 2024-06-05 NOTE — PROGRESS NOTES
Phone-Visit Details    Type of service:  Phone Visit    Phone call duration: 39 minutes    Provider location:  offsite  Appointment scheduled as video visit and pt checked in but video kept freezing soon after visit started so converted to telephone visit.      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     He stated that he has pain everyday. Over the last few day had even more pain. He stated that especially has pain when he has to go to the bathroom and when he eats and digesting food hurts. Due to this, he has been trying to avoid eating a lot of fat and protein as he doesn't digest them well. He started taking Creon and now taking 2 Creon with meals. He is taking the enzymes either 30 minutes before a meal or 30 minutes after a meal. He stated that his blood sugars have been good. 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 His mother will cook some meals for him, but he has not been able to eat meals in a while/in months since January after having a scare and worsening symptoms at that time. Since January, he has not been eating much and has had very limited protein and fat in his diet. In 2019, he weighed 340 lbs. Initially he was working on intentional weight loss at that time and was walking daily but now he has lost weight unintentionally and feels to weak and tired to do any exercise/walking. He estimated losing about 8 lbs in the past month. He would like to lose more weight but intentionally.     Height:   Ht Readings from Last 1 Encounters:   06/05/24  "1.829 m (6')     Weight: weighed 340 lbs in 2019 before having issues with pancreas. Some of the weight loss was intentional in 2019 and was walking daily. Now losing weight unintentionally.  Wt Readings from Last 10 Encounters:   06/05/24 117.9 kg (260 lb)   05/08/24 121.6 kg (268 lb)     BMI: Estimated body mass index is 35.26 kg/m  as calculated from the following:    Height as of this encounter: 1.829 m (6').    Weight as of this encounter: 117.9 kg (260 lb).    Diet Recall:  some of his usual foods include lowfat, lower sugar pudding, SF popsicles, cereal with milk and baby carrots, occas eggs or fruit. He is just eating small amounts at a time and throughout the day. When he first gets up not hungry, so his first meal will be an Equate nutrition drink as this is better tolerated at that time. Throughout the day, when he is hungry he will eat very small items/snacks. See estimated daily intake below    He estimated on average in a day, he eats 1 bowl cereal with milk, 1 box of SF popsicles, 1 low fat, low sugar pudding, 1 Equate nutritional drink for diabetics (has 180 calories, 16 grams CHOs and 10 grams protein) and some baby carrots in a day. Occasionally has scrambled eggs. Every so often fruit.     Daily Beverages: 1 equate drink, 3-4 liters water, occas diet soda  Alcohol: none/discontinued all alcohol, 8 years ago.    Frequency of eating/taking out meals: used to but not over past year or so.     Labs:    Last Comprehensive Metabolic Panel:  No results found for: \"NA\", \"POTASSIUM\", \"CHLORIDE\", \"CO2\", \"ANIONGAP\", \"GLC\", \"BUN\", \"CR\", \"GFRESTIMATED\", \"PHAM\"  CBC RESULTS: No results for input(s): \"WBC\", \"RBC\", \"HGB\", \"HCT\", \"MCV\", \"MCH\", \"MCHC\", \"RDW\", \"PLT\" in the last 82906 hours.    Pertinent Medications/vitamin and mineral supplements:      Current Outpatient Medications   Medication Sig Dispense Refill    acetaminophen (TYLENOL) 500 MG tablet Take 1,000 mg by mouth      alum & mag hydroxide-simethicone " (CONSTANTINO-LANTA) 200-200-20 MG/5ML SUSP suspension Take 15 mLs by mouth      amitriptyline (ELAVIL) 25 MG tablet Take 25 mg by mouth at bedtime      amLODIPine (NORVASC) 10 MG tablet Take 1 tablet by mouth daily      carvedilol (COREG) 25 MG tablet Take 50 mg by mouth      Continuous Glucose Sensor (DEXCOM G6 SENSOR) MISC CHANGE SENSOR EVERY 10 DAYS      Continuous Glucose Transmitter (DEXCOM G6 TRANSMITTER) MISC CHANGE EVERY 90 DAYS AS DIRECTED      DULoxetine (CYMBALTA) 60 MG capsule Take 60 mg by mouth      hydrALAZINE (APRESOLINE) 100 MG tablet Take 100 mg by mouth      isosorbide mononitrate (IMDUR) 30 MG 24 hr tablet Take 90 mg by mouth      Lidocaine (LIDOCARE) 4 % Patch Place 1-3 patches onto the skin      lipase-protease-amylase (CREON) 51803-05410-404853 units CPEP per EC capsule Take 2-3 with meals / 1-2 with snacks, up to 15 per day. 450 capsule 6    metFORMIN (GLUCOPHAGE) 1000 MG tablet Take 1,000 mg by mouth 2 times daily (with meals)      Multiple Vitamins-Minerals (SUPER THERA TOÑO M) TABS Take 1 tablet by mouth daily      ondansetron (ZOFRAN ODT) 4 MG ODT tab Place 4 mg under the tongue      pantoprazole (PROTONIX) 40 MG EC tablet Take 40 mg by mouth      polyethylene glycol (MIRALAX) 17 g packet 17 g by Nasogastric route      pregabalin (LYRICA) 100 MG capsule Take 1 capsule by mouth 3 times daily       No current facility-administered medications for this visit.     Food Allergies:  NKFA   Physical Activity:  has been too tired to exercise.  Estimated Nutrition Needs based on ideal body weight of 80 k-2400 calories (25-30 kcals/kg), 80-96g protein (1-1.2g/kg), ~1 ml/kcal or total fluids per MD.     MALNUTRITION:  % Weight Loss:  Weight loss does not meet criteria for malnutrition   % Intake:  </= 50% for >/= 1 month (severe malnutrition)  Subcutaneous Fat Loss:  unable to assess  Muscle Loss:  unable to assess  Fluid Retention:  None noted    Malnutrition Diagnosis: Patient does not meet two  of the above criteria necessary for diagnosing malnutrition  In Context of:  Chronic illness or disease    Nutrition Prescription: Nutrition Education     Nutrition Intervention      Provided diet education for chronic pancreatitis, and for hx diabetes.     Answered patient's questions. Patient verbalized understanding of education provided. See Goals below.     Educational Materials Provided:  NCM: pancreatitis nutrition therapy    Goals:    Aim for eating at least 4-6 smaller meals per day.    --start gradually including more lower fat/leaner options and lean protein options into you meals/day. In general for some balanced meals, I recommend including some lean protein along with meals along with some vegetables and a complex carbohydrate (fruit, whole grain or starchy vegetables). Since you haven't been eating meals/balanced meals, start with small amount of foods at meals and can gradually increase as tolerated to eat a smaller balanced meal.     --For example could start with 1 ounce of lean protein/lean meat/fish, along with a few tablespoons of vegetables and few tablespoons of a starch/fruit at a meal, and gradually increase per your comfort to having some more at meals.     Continue to eat a lower fat diet:    -Choose lean proteins/lean cuts of meats. Remove skins from chicken breast and turkey breast, fish (baked/grilled and not fried or breaded), pork tenderloin, and limit beef as it is higher in fat but if having any, then choose 93%-97%  lean beef. Okay for egg whites and eggs (if having egg yolks limit to a couple as there is some more fat in the yolks    -Use lower fat cooking methods such as baking, broiling, grilling.    -Limit using a lot of fats/oil/high fat sauces/butter when preparing foods. Okay to cook with small amount/such as 1-2 teaspoons otherwise try using pans that do not need a lot of fats for cooking.    -Choose skim or 1% based dairy products (such as lowfat yogurts, skim/1% milk, non  fat/1% cottage cheese, lowfat pudding).    -Do not eat large amounts of nuts, seeds, nut butters, avocado. If having any, choose a small portion such as 6 nuts or a tablespoon of nut butter or avocado.    3.  Continue drinking nutrition drinks as you are doing, but since you are eating so little, recommend drinking at least 2-3 per day, until you can expand and increase your diet. It's okay to choose some higher protein drinks. These drinks count towards those 4-6 meals per day.(Make sure to take your Creon Enzymes with the nutrition drinks.    -Some examples include Ensure Max, Ensure high protein, Boost high protein max, Premier protein, Equate drinks (look for the higher protein options).    4.  Take your Creon enzymes capsules at the beginning of meals, snacks, and also with protein drinks.     5.  Continue to drink at least 2 or more liters of water per day as you are currently doing.    6.  Avoid all alcohol (as you have been doing).    Nutrition Monitoring and Evaluation: Will monitor adherence to nutrition recommendations at future RD visits.     Further Medical Nutrition Therapy:  Follow-up as needed.    Patient was encouraged to contact RD with any further questions.    Danya Vila, MS, RD, LD

## 2024-06-05 NOTE — LETTER
6/5/2024      Abiel Weeks  [address]  [place] [address]      Dear Colleague,    Thank you for referring your patient, Abiel Weeks, to the [facility] [place]. Please see a copy of my visit note below.    Phone-Visit Details    Type of service:  Phone Visit    Phone call duration: 39 minutes    Provider location:  offsite  Appointment scheduled as video visit and pt checked in but video kept freezing soon after visit started so converted to telephone visit.      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     He stated that he has pain everyday. Over the last few day had even more pain. He stated that especially has pain when he has to go to the bathroom and when he eats and digesting food hurts. Due to this, he has been trying to avoid eating a lot of fat and protein as he doesn't digest them well. He started taking Creon and now taking 2 Creon with meals. He is taking the enzymes either 30 minutes before a meal or 30 minutes after a meal. He stated that his blood sugars have been good. 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 His mother will cook some meals for him, but he has not been able to eat meals in a while/in months since January after having a scare and worsening symptoms at that time. Since January, he has not been eating much and has had very limited protein and fat in his diet. In 2019, he weighed 340 lbs. Initially he was working on intentional weight loss at that time and was walking daily but  "now he has lost weight unintentionally and feels to weak and tired to do any exercise/walking. He estimated losing about 8 lbs in the past month. He would like to lose more weight but intentionally.     Height:   Ht Readings from Last 1 Encounters:   06/05/24 1.829 m (6')     Weight: weighed 340 lbs in 2019 before having issues with pancreas. Some of the weight loss was intentional in 2019 and was walking daily. Now losing weight unintentionally.  Wt Readings from Last 10 Encounters:   06/05/24 117.9 kg (260 lb)   05/08/24 121.6 kg (268 lb)     BMI: Estimated body mass index is 35.26 kg/m  as calculated from the following:    Height as of this encounter: 1.829 m (6').    Weight as of this encounter: 117.9 kg (260 lb).    Diet Recall:  some of his usual foods include lowfat, lower sugar pudding, SF popsicles, cereal with milk and baby carrots, occas eggs or fruit. He is just eating small amounts at a time and throughout the day. When he first gets up not hungry, so his first meal will be an Equate nutrition drink as this is better tolerated at that time. Throughout the day, when he is hungry he will eat very small items/snacks. See estimated daily intake below    He estimated on average in a day, he eats 1 bowl cereal with milk, 1 box of SF popsicles, 1 low fat, low sugar pudding, 1 Equate nutritional drink for diabetics (has 180 calories, 16 grams CHOs and 10 grams protein) and some baby carrots in a day. Occasionally has scrambled eggs. Every so often fruit.     Daily Beverages: 1 equate drink, 3-4 liters water, occas diet soda  Alcohol: none/discontinued all alcohol, 8 years ago.    Frequency of eating/taking out meals: used to but not over past year or so.     Labs:    Last Comprehensive Metabolic Panel:  No results found for: \"NA\", \"POTASSIUM\", \"CHLORIDE\", \"CO2\", \"ANIONGAP\", \"GLC\", \"BUN\", \"CR\", \"GFRESTIMATED\", \"PHAM\"  CBC RESULTS: No results for input(s): \"WBC\", \"RBC\", \"HGB\", \"HCT\", \"MCV\", \"MCH\", \"MCHC\", \"RDW\", " "\"PLT\" in the last 96963 hours.    Pertinent Medications/vitamin and mineral supplements:      Current Outpatient Medications   Medication Sig Dispense Refill    acetaminophen (TYLENOL) 500 MG tablet Take 1,000 mg by mouth      alum & mag hydroxide-simethicone (CONSTANTINO-LANTA) 200-200-20 MG/5ML SUSP suspension Take 15 mLs by mouth      amitriptyline (ELAVIL) 25 MG tablet Take 25 mg by mouth at bedtime      amLODIPine (NORVASC) 10 MG tablet Take 1 tablet by mouth daily      carvedilol (COREG) 25 MG tablet Take 50 mg by mouth      Continuous Glucose Sensor (DEXCOM G6 SENSOR) MISC CHANGE SENSOR EVERY 10 DAYS      Continuous Glucose Transmitter (DEXCOM G6 TRANSMITTER) MISC CHANGE EVERY 90 DAYS AS DIRECTED      DULoxetine (CYMBALTA) 60 MG capsule Take 60 mg by mouth      hydrALAZINE (APRESOLINE) 100 MG tablet Take 100 mg by mouth      isosorbide mononitrate (IMDUR) 30 MG 24 hr tablet Take 90 mg by mouth      Lidocaine (LIDOCARE) 4 % Patch Place 1-3 patches onto the skin      lipase-protease-amylase (CREON) 46064-60831-976850 units CPEP per EC capsule Take 2-3 with meals / 1-2 with snacks, up to 15 per day. 450 capsule 6    metFORMIN (GLUCOPHAGE) 1000 MG tablet Take 1,000 mg by mouth 2 times daily (with meals)      Multiple Vitamins-Minerals (SUPER THERA TOÑO M) TABS Take 1 tablet by mouth daily      ondansetron (ZOFRAN ODT) 4 MG ODT tab Place 4 mg under the tongue      pantoprazole (PROTONIX) 40 MG EC tablet Take 40 mg by mouth      polyethylene glycol (MIRALAX) 17 g packet 17 g by Nasogastric route      pregabalin (LYRICA) 100 MG capsule Take 1 capsule by mouth 3 times daily       No current facility-administered medications for this visit.     Food Allergies:  NKFA   Physical Activity:  has been too tired to exercise.  Estimated Nutrition Needs based on ideal body weight of 80 k-2400 calories (25-30 kcals/kg), 80-96g protein (1-1.2g/kg), ~1 ml/kcal or total fluids per MD.     MALNUTRITION:  % Weight Loss:  Weight " loss does not meet criteria for malnutrition   % Intake:  </= 50% for >/= 1 month (severe malnutrition)  Subcutaneous Fat Loss:  unable to assess  Muscle Loss:  unable to assess  Fluid Retention:  None noted    Malnutrition Diagnosis: Patient does not meet two of the above criteria necessary for diagnosing malnutrition  In Context of:  Chronic illness or disease    Nutrition Prescription: Nutrition Education     Nutrition Intervention      Provided diet education for chronic pancreatitis, and for hx diabetes.     Answered patient's questions. Patient verbalized understanding of education provided. See Goals below.     Educational Materials Provided:  NCM: pancreatitis nutrition therapy    Goals:    Aim for eating at least 4-6 smaller meals per day.    --start gradually including more lower fat/leaner options and lean protein options into you meals/day. In general for some balanced meals, I recommend including some lean protein along with meals along with some vegetables and a complex carbohydrate (fruit, whole grain or starchy vegetables). Since you haven't been eating meals/balanced meals, start with small amount of foods at meals and can gradually increase as tolerated to eat a smaller balanced meal.     --For example could start with 1 ounce of lean protein/lean meat/fish, along with a few tablespoons of vegetables and few tablespoons of a starch/fruit at a meal, and gradually increase per your comfort to having some more at meals.     Continue to eat a lower fat diet:    -Choose lean proteins/lean cuts of meats. Remove skins from chicken breast and turkey breast, fish (baked/grilled and not fried or breaded), pork tenderloin, and limit beef as it is higher in fat but if having any, then choose 93%-97%  lean beef. Okay for egg whites and eggs (if having egg yolks limit to a couple as there is some more fat in the yolks    -Use lower fat cooking methods such as baking, broiling, grilling.    -Limit using a lot of  fats/oil/high fat sauces/butter when preparing foods. Okay to cook with small amount/such as 1-2 teaspoons otherwise try using pans that do not need a lot of fats for cooking.    -Choose skim or 1% based dairy products (such as lowfat yogurts, skim/1% milk, non fat/1% cottage cheese, lowfat pudding).    -Do not eat large amounts of nuts, seeds, nut butters, avocado. If having any, choose a small portion such as 6 nuts or a tablespoon of nut butter or avocado.    3.  Continue drinking nutrition drinks as you are doing, but since you are eating so little, recommend drinking at least 2-3 per day, until you can expand and increase your diet. It's okay to choose some higher protein drinks. These drinks count towards those 4-6 meals per day.(Make sure to take your Creon Enzymes with the nutrition drinks.    -Some examples include Ensure Max, Ensure high protein, Boost high protein max, Premier protein, Equate drinks (look for the higher protein options).    4.  Take your Creon enzymes capsules at the beginning of meals, snacks, and also with protein drinks.     5.  Continue to drink at least 2 or more liters of water per day as you are currently doing.    6.  Avoid all alcohol (as you have been doing).    Nutrition Monitoring and Evaluation: Will monitor adherence to nutrition recommendations at future RD visits.     Further Medical Nutrition Therapy:  Follow-up as needed.    Patient was encouraged to contact RD with any further questions.        Again, thank you for allowing me to participate in the care of your patient.      Sincerely,    Danya Vila RD

## 2024-06-05 NOTE — PATIENT INSTRUCTIONS
It was nice speaking with you today. Below are the nutrition recommendations we discussed at your visit.    Please let me know if you have any additional questions.    Nutrition Recommendations:    Aim for eating at least 4-6 smaller meals per day.    --start gradually including more lower fat/leaner options and lean protein options into you meals/day. In general for some balanced meals, I recommend including some lean protein along with meals along with some vegetables and a complex carbohydrate (fruit, whole grain or starchy vegetables). Since you haven't been eating meals/balanced meals, start with small amount of foods at meals and can gradually increase as tolerated to eat a smaller balanced meal.     --For example could start with 1 ounce of lean protein/lean meat/fish, along with a few tablespoons of vegetables and few tablespoons of a starch/fruit at a meal, and gradually increase per your comfort to having some more at meals.     Continue to eat a lower fat diet:    -Choose lean proteins/lean cuts of meats. Remove skins from chicken breast and turkey breast, fish (baked/grilled and not fried or breaded), pork tenderloin, and limit beef as it is higher in fat but if having any, then choose 93%-97%  lean beef. Okay for egg whites and eggs (if having egg yolks limit to a couple as there is some more fat in the yolks    -Use lower fat cooking methods such as baking, broiling, grilling.    -Limit using a lot of fats/oil/high fat sauces/butter when preparing foods. Okay to cook with small amount/such as 1-2 teaspoons otherwise try using pans that do not need a lot of fats for cooking.    -Choose skim or 1% based dairy products (such as lowfat yogurts, skim/1% milk, non fat/1% cottage cheese, lowfat pudding).    -Do not eat large amounts of nuts, seeds, nut butters, avocado. If having any, choose a small portion such as 6 nuts or a tablespoon of nut butter or avocado.    3.  Continue drinking nutrition drinks as  you are doing, but since you are eating so little, recommend drinking at least 2-3 per day, until you can expand and increase your diet. It's okay to choose some higher protein drinks. These drinks count towards those 4-6 meals per day.(Make sure to take your Creon Enzymes with the nutrition drinks.    -Some examples include Ensure Max, Ensure high protein, Boost high protein max, Premier protein, Equate drinks (look for the higher protein options).    4.  Take your Creon enzymes capsules at the beginning of meals, snacks, and also with protein drinks.     5.  Continue to drink at least 2 or more liters of water per day as you are currently doing.    6.  Avoid all alcohol (as you have been doing).    Can follow up as needed.    If you would like to schedule a follow up appointment, please call [phone].    Danya Vila, MS, RD, LD

## 2024-06-05 NOTE — NURSING NOTE
Is the patient currently in the state of [place]? YES    Visit mode:VIDEO    If the visit is dropped, the patient can be reconnected by: VIDEO VISIT: Text to cell phone:   Telephone Information:   Mobile [phone]       Will anyone else be joining the visit? NO  (If patient encounters technical issues they should call [phone] :[phone])    How would you like to obtain your AVS? MyChart    Are changes needed to the allergy or medication list? No    Are refills needed on medications prescribed by this physician? NO    Reason for visit: Video Visit (Consult)    Marzena TOTH

## 2024-06-17 ENCOUNTER — VIRTUAL VISIT (OUTPATIENT)
Dept: GASTROENTEROLOGY | Facility: CLINIC | Age: 39
End: 2024-06-17
Payer: COMMERCIAL

## 2024-06-17 VITALS — WEIGHT: 268 LBS | BODY MASS INDEX: 36.3 KG/M2 | HEIGHT: 72 IN

## 2024-06-17 DIAGNOSIS — K86.1 CHRONIC RECURRENT PANCREATITIS (H): Primary | ICD-10-CM

## 2024-06-17 PROCEDURE — 99214 OFFICE O/P EST MOD 30 MIN: CPT | Mod: 95 | Performed by: INTERNAL MEDICINE

## 2024-06-17 ASSESSMENT — PAIN SCALES - GENERAL: PAINLEVEL: MILD PAIN (2)

## 2024-06-17 NOTE — PROGRESS NOTES
Virtual Visit Details    Type of service:  Video Visit   See other note    Originating Location (pt. Location): Home    Distant Location (provider location):  Off-site  Platform used for Video Visit: Sheri

## 2024-06-17 NOTE — PROGRESS NOTES
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  He has had multiple bouts of recurrent acute pancreatitis a stricture in the partially atrophied body tail with upstream pseudocyst that comes and goes.  He is had transluminal drainage of the pseudocyst in the past at [facility] but does not have indwelling double-pigtail stents.  Dr. Castillo tried ERCP with trans papillary drainage through a tight stricture in the body tail but was unable to advance anything over the wire.  Patient did develop some post ERCP pancreatitis and pain.  He was ultimately referred to us through his mother's friend who had had care here and we saw him for the first time in May last month and arranged to secretin MRCP, a fecal elastase and started him on pancreatic enzyme therapy for ongoing meal related pain.  He now feels significantly but not adequately better on the enzymes with more tolerance of food but still has nagging left upper quadrant pain.  The elastase was not completed MRCP is as below.  There is comment of atrophy of the tail with a slightly enlarging pseudocyst that is about 3 cm now.  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  Since this problem has been recurring over the years since his initial bout of necrotizing gallstone pancreatitis it seems appropriate to pursue more aggressive therapy.  I would suggest that we consider transluminal drainage of the walled off collection with cystotome and double-pigtail stents meant to stay in indefinitely.  If that turns out not to be feasible then I would offer him an ERCP with trans papillary stricture dilation and stenting but only as a temporizing measure since we do not want him to become stent dependent for life.  We went over the risks of these therapies including  pancreatitis infection bleeding perforation etc. but the alternative of distal pancreatectomy splenectomy seems very suboptimal and this debilitated patient with heart disease and elevated BMI of 36 and diabetes  Patient would like to pursue this course    Plan is  #1 reviewed the case with Dr. Villafuerte  #2 pending agreement schedule him for EUS with transluminal drainage of the pseudocyst in the tail if feasible but if it is not safe for her double-pigtail stenting then proceed with ERCP with trans papillary stricture dilation and stenting          Joined the call at 6/17/2024, 11:38:58 am.  Left the call at 6/17/2024, 11:51:42 am.  You were on the call for 12 minutes 44 seconds .  20 minutes additional reviewing images that are uploaded, history, outside records     Study Result    Narrative & Impression   MRI ABDOMEN      CLINICAL HISTORY: Chronic recurrent pancreatitis (H)     DATE: 5/30/2024 8:21 AM     TECHNIQUE:      TECHNIQUE: Coronal T2 HASTE, sagittal T2 HASTE, axial T2 STIR, axial  ARNULFO T2, In-phase and Out-of-phase axial breath-hold FLASH,  diffusion-weighted, and T1-weighted VIBE axial fat saturation images  were obtained. Thick and thin slab heavily T2-weighted MRCP images  were obtained. 3-D reformatted images were created by the  technologist. Following administration of secretin, T2-weighted HASTE  images were obtained at 1 minute intervals to 7 minutes, and an  additional 10 minutes image was also obtained. Contrast: 12 mL  Intravenous.     Comparison study: CT abdomen and pelvis dated 1/23/2024.     FINDINGS:     There is no pancreas divisum. The pancreatic duct does not appear  abnormally dilated, and 1-2 sidebranches are visualized in the tail  region.      Pancreatic duct measures 2 mm prior to administration of secretin.  Following administration of secretin, it dilates to maximum diameter  of 3.5 mm.       There is normal exocrine function, with contrast seen in the fourth  portion of the  duodenum at 10 minutes following secretin  administration.     No intra-or extra hepatic biliary dilation.  The common bile duct  measures 4 mm.      Pancreas: Preserved intrinsic T1 signal in the head and proximal body,  loss of normal signal in the distal body and tail. No evidence of  peripancreatic inflammation. There is moderate atrophy of the distal  pancreatic body and tail with redemonstrated T2 hyperintense  pseudocyst in the pancreatic tail measuring 3.1 x 2.4 cm (10/35),  previously 2.5 x 2 cm.     Liver: Mildly enlarged at 21 cm in craniocaudal dimension. No  significant fat or iron deposition. No focal mass.      Gallbladder: Cholecystectomy.     Spleen: Normal.     Kidneys: No hydronephrosis. Few punctate T2 hyperintense cortical  cysts.     Adrenal glands: Normal.     Bowel: Normal caliber small and large bowel. Diffuse gastric wall  thickening.      Lymph nodes: No adenopathy.     Blood vessels: Patent major abdominal vasculature. Few dilated  perisplenic collaterals.      Lung bases: Normal.     Bones and soft tissues: Normal.     Mesentery and abdominal wall: Small fat-containing umbilical hernia.     Ascites: Not present.                                                                      IMPRESSION:   1. Slight interval enlargement of pseudocyst in the pancreatic tail  region measuring up to 3.1 cm, previously 2.5 cm.   2. Moderate atrophy of the distal pancreatic body and tail with few  dilated sidebranches in the tail region. Main pancreatic duct is  non-dilated.   3. Normal pancreatic exocrine function.  4. Diffuse gastric wall thickening may relate to gastritis.      CAMILLE LOPEZ MD    Miscellaneous Results - Endoscopy (07/21/2023 10:58 AM CDT)  Narrative   TEST - 07/21/2023 5:11 PM CDT   [facility]  Patient Name: Abiel Weeks  Procedure Date: 7/21/2023 10:58 AM  MRN: [id number]  Account Number: [id number]  YOB: 1985  Note Status: Finalized  Attending MD:  Bert Castillo MD,  Procedure:      ERCP  Indications:      38 y.o. with known acute recurrent pancreatitis, pseudocyst in 2019 s/p      endoscopic drainage at that point, did well until recently when      developed acute recurrent pancreatitis again with a likely pseudocyst in      the tail of the pancreas. Underwent an ERCP 2 weeks ago where a high      grade stricture was found in the body of the pancreas and could not be      traversed. EUS at that pont confirmed a stricture, atrophy of the      pancreatic parenchyma in the tail, and transmural drainage of the      pseudocyst performed with removal of a significant amount of pus. Doing      well since then, plan ERCP to potentially traverse the stricture and      remove cystgastrostomy stents.  Providers:      Bert Castillo MD, Ariela Middleton RN, Germaine Gann RN, Bert Castillo MD  Requesting Provider:    Medicines:      General Anesthesia  Complications:      No immediate complications. Estimated blood loss: Minimal.  Procedure:      Pre-Anesthesia Assessment:      - Prior to the procedure, a History and Physical was performed, and      patient medications and allergies were reviewed. The patient is      competent. The risks and benefits of the procedure and the sedation      options and risks were discussed with the patient. All questions were      answered and informed consent was obtained. Patient identification and      proposed procedure were verified by the physician, the nurse and the      anesthetist in the pre-procedure area in the procedure room. Mental      Status Examination: alert and oriented. Prophylactic Antibiotics: The      patient does not require prophylactic antibiotics. Prior Anticoagulants:      The patient has taken no anticoagulant or antiplatelet agents. ASA Grade      Assessment: III - A patient with severe systemic disease. After      reviewing the risks and benefits, the patient was deemed in satisfactory      condition to undergo  the procedure. The anesthesia plan was to use      general anesthesia. Immediately prior to administration of medications,      the patient was re-assessed for adequacy to receive sedatives. The heart      rate, respiratory rate, oxygen saturations, blood pressure, adequacy of      pulmonary ventilation, and response to care were monitored throughout      the procedure. The physical status of the patient was re-assessed after      the procedure.      After obtaining informed consent, the scope was passed under direct      vision. Throughout the procedure, the patient's blood pressure, pulse,      and oxygen saturations were monitored continuously. The Duodenoscope was      introduced through the mouth, and advanced to the duodenum and used to      inject contrast into the ventral pancreatic duct. The ERCP was      accomplished without difficulty. The patient tolerated the procedure      well.  Findings:      The  film was normal. The esophagus was successfully intubated      under direct vision. The scope was advanced to a normal major papilla in      the descending duodenum without detailed examination of the pharynx,      larynx and associated structures, and upper GI tract. The upper GI tract      was grossly normal. The ventral pancreatic duct was deeply cannulated      with the short-nosed traction sphincterotome. Contrast was injected. The      pancreatic duct in the body of the pancreas contained a severe stenosis.      A straight 0.021 inch Tracer [employer] Direct wire was passed into the      ventral pancreatic duct and advanced past the stricture. Contrast was      injected through a 5-4-3 cannula, and a very small portion of the PD      filled just upstream from the stricture. The guidewire was clearly      advanced through this area and to the tail of the pancreas, but no      device could be advanced across the stricture.      Both stents were removed from the pseudocyst using a rat-toothed       forceps. The adult gastroscope was advanced to the cystgastrostomy and      contrast was injected. There was no extravasation of contrast, and the      PD did not fill.  Impression:      - High grade pancreatic stricture in the body of the pancreas traversed      with a wire, but no device could be advanced across.      - Previously placed stents removed, no extravasation of contrast seen.  Recommendation:      - Discharge patient to home.      - Return to GI clinic in 1 month.      - Likely no utility in treating the stricture given parenchymal atrophy      in the tail unless more fluid collections develop in the future.  Procedure Code(s):      --- Professional ---      70723, Endoscopic retrograde cholangiopancreatography (ERCP);      diagnostic, including collection of specimen(s) by brushing or washing,      when performed (separate procedure)      88308, Esophagogastroduodenoscopy, flexible, transoral; with removal of      foreign body(s)  Diagnosis Code(s):      --- Professional ---      K86.89, Other specified diseases of pancreas      Z46.59, Encounter for fitting and adjustment of other gastrointestinal      appliance and device      K86.3, Pseudocyst of pancreas  CPT copyright 2022 [employer]. All rights reserved.  The codes documented in this report are preliminary and upon  review may  be revised to meet current compliance requirements.  Bert Castillo MD  7/21/2023 5:11:25 PM  Number of Addenda: 0  Note Initiated On: 7/21/2023 10:58 AM      [address] [id number]

## 2024-06-17 NOTE — LETTER
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  He has had multiple bouts of recurrent acute pancreatitis a stricture in the partially atrophied body tail with upstream pseudocyst that comes and goes.  He is had transluminal drainage of the pseudocyst in the past at [facility] but does not have indwelling double-pigtail stents.  Dr. Castillo tried ERCP with trans papillary drainage through a tight stricture in the body tail but was unable to advance anything over the wire.  Patient did develop some post ERCP pancreatitis and pain.  He was ultimately referred to us through his mother's friend who had had care here and we saw him for the first time in May last month and arranged to secretin MRCP, a fecal elastase and started him on pancreatic enzyme therapy for ongoing meal related pain.  He now feels significantly but not adequately better on the enzymes with more tolerance of food but still has nagging left upper quadrant pain.  The elastase was not completed MRCP is as below.  There is comment of atrophy of the tail with a slightly enlarging pseudocyst that is about 3 cm now.  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  Since this problem has been recurring over the years since his initial bout of necrotizing gallstone pancreatitis it seems appropriate to pursue more aggressive therapy.  I would suggest that we consider transluminal drainage of the walled off collection with cystotome and double-pigtail stents meant to stay in indefinitely.  If that turns out not to be feasible then I would offer him an ERCP with trans papillary stricture dilation and stenting but only as a temporizing measure since we do not want  him to become stent dependent for life.  We went over the risks of these therapies including pancreatitis infection bleeding perforation etc. but the alternative of distal pancreatectomy splenectomy seems very suboptimal and this debilitated patient with heart disease and elevated BMI of 36 and diabetes  Patient would like to pursue this course    Plan is  #1 reviewed the case with Dr. Villafuerte  #2 pending agreement schedule him for EUS with transluminal drainage of the pseudocyst in the tail if feasible but if it is not safe for her double-pigtail stenting then proceed with ERCP with trans papillary stricture dilation and stenting          Joined the call at 6/17/2024, 11:38:58 am.  Left the call at 6/17/2024, 11:51:42 am.  You were on the call for 12 minutes 44 seconds .  20 minutes additional reviewing images that are uploaded, history, outside records     Study Result    Narrative & Impression   MRI ABDOMEN      CLINICAL HISTORY: Chronic recurrent pancreatitis (H)     DATE: 5/30/2024 8:21 AM     TECHNIQUE:      TECHNIQUE: Coronal T2 HASTE, sagittal T2 HASTE, axial T2 STIR, axial  ARNULFO T2, In-phase and Out-of-phase axial breath-hold FLASH,  diffusion-weighted, and T1-weighted VIBE axial fat saturation images  were obtained. Thick and thin slab heavily T2-weighted MRCP images  were obtained. 3-D reformatted images were created by the  technologist. Following administration of secretin, T2-weighted HASTE  images were obtained at 1 minute intervals to 7 minutes, and an  additional 10 minutes image was also obtained. Contrast: 12 mL  Intravenous.     Comparison study: CT abdomen and pelvis dated 1/23/2024.     FINDINGS:     There is no pancreas divisum. The pancreatic duct does not appear  abnormally dilated, and 1-2 sidebranches are visualized in the tail  region.      Pancreatic duct measures 2 mm prior to administration of secretin.  Following administration of secretin, it dilates to maximum diameter  of 3.5 mm.        There is normal exocrine function, with contrast seen in the fourth  portion of the duodenum at 10 minutes following secretin  administration.     No intra-or extra hepatic biliary dilation.  The common bile duct  measures 4 mm.      Pancreas: Preserved intrinsic T1 signal in the head and proximal body,  loss of normal signal in the distal body and tail. No evidence of  peripancreatic inflammation. There is moderate atrophy of the distal  pancreatic body and tail with redemonstrated T2 hyperintense  pseudocyst in the pancreatic tail measuring 3.1 x 2.4 cm (10/35),  previously 2.5 x 2 cm.     Liver: Mildly enlarged at 21 cm in craniocaudal dimension. No  significant fat or iron deposition. No focal mass.      Gallbladder: Cholecystectomy.     Spleen: Normal.     Kidneys: No hydronephrosis. Few punctate T2 hyperintense cortical  cysts.     Adrenal glands: Normal.     Bowel: Normal caliber small and large bowel. Diffuse gastric wall  thickening.      Lymph nodes: No adenopathy.     Blood vessels: Patent major abdominal vasculature. Few dilated  perisplenic collaterals.      Lung bases: Normal.     Bones and soft tissues: Normal.     Mesentery and abdominal wall: Small fat-containing umbilical hernia.     Ascites: Not present.                                                                      IMPRESSION:   1. Slight interval enlargement of pseudocyst in the pancreatic tail  region measuring up to 3.1 cm, previously 2.5 cm.   2. Moderate atrophy of the distal pancreatic body and tail with few  dilated sidebranches in the tail region. Main pancreatic duct is  non-dilated.   3. Normal pancreatic exocrine function.  4. Diffuse gastric wall thickening may relate to gastritis.      CAMILLE LOPEZ MD    Miscellaneous Results - Endoscopy (07/21/2023 10:58 AM CDT)  Narrative   TEST - 07/21/2023 5:11 PM CDT   [facility]  Patient Name: Abiel Weeks  Procedure Date: 7/21/2023 10:58 AM  MRN:  [id number]  Account Number: [id number]  YOB: 1985  Note Status: Finalized  Attending MD: Bert Castillo MD,  Procedure:      ERCP  Indications:      38 y.o. with known acute recurrent pancreatitis, pseudocyst in 2019 s/p      endoscopic drainage at that point, did well until recently when      developed acute recurrent pancreatitis again with a likely pseudocyst in      the tail of the pancreas. Underwent an ERCP 2 weeks ago where a high      grade stricture was found in the body of the pancreas and could not be      traversed. EUS at that pont confirmed a stricture, atrophy of the      pancreatic parenchyma in the tail, and transmural drainage of the      pseudocyst performed with removal of a significant amount of pus. Doing      well since then, plan ERCP to potentially traverse the stricture and      remove cystgastrostomy stents.  Providers:      Bert Castillo MD, Ariela Middleton RN, Germaine Gann RN, Bert Castillo MD  Requesting Provider:    Medicines:      General Anesthesia  Complications:      No immediate complications. Estimated blood loss: Minimal.  Procedure:      Pre-Anesthesia Assessment:      - Prior to the procedure, a History and Physical was performed, and      patient medications and allergies were reviewed. The patient is      competent. The risks and benefits of the procedure and the sedation      options and risks were discussed with the patient. All questions were      answered and informed consent was obtained. Patient identification and      proposed procedure were verified by the physician, the nurse and the      anesthetist in the pre-procedure area in the procedure room. Mental      Status Examination: alert and oriented. Prophylactic Antibiotics: The      patient does not require prophylactic antibiotics. Prior Anticoagulants:      The patient has taken no anticoagulant or antiplatelet agents. ASA Grade      Assessment: III - A patient with severe systemic disease. After       reviewing the risks and benefits, the patient was deemed in satisfactory      condition to undergo the procedure. The anesthesia plan was to use      general anesthesia. Immediately prior to administration of medications,      the patient was re-assessed for adequacy to receive sedatives. The heart      rate, respiratory rate, oxygen saturations, blood pressure, adequacy of      pulmonary ventilation, and response to care were monitored throughout      the procedure. The physical status of the patient was re-assessed after      the procedure.      After obtaining informed consent, the scope was passed under direct      vision. Throughout the procedure, the patient's blood pressure, pulse,      and oxygen saturations were monitored continuously. The Duodenoscope was      introduced through the mouth, and advanced to the duodenum and used to      inject contrast into the ventral pancreatic duct. The ERCP was      accomplished without difficulty. The patient tolerated the procedure      well.  Findings:      The  film was normal. The esophagus was successfully intubated      under direct vision. The scope was advanced to a normal major papilla in      the descending duodenum without detailed examination of the pharynx,      larynx and associated structures, and upper GI tract. The upper GI tract      was grossly normal. The ventral pancreatic duct was deeply cannulated      with the short-nosed traction sphincterotome. Contrast was injected. The      pancreatic duct in the body of the pancreas contained a severe stenosis.      A straight 0.021 inch Tracer [employer] Direct wire was passed into the      ventral pancreatic duct and advanced past the stricture. Contrast was      injected through a 5-4-3 cannula, and a very small portion of the PD      filled just upstream from the stricture. The guidewire was clearly      advanced through this area and to the tail of the pancreas, but no      device could be advanced  across the stricture.      Both stents were removed from the pseudocyst using a rat-toothed      forceps. The adult gastroscope was advanced to the cystgastrostomy and      contrast was injected. There was no extravasation of contrast, and the      PD did not fill.  Impression:      - High grade pancreatic stricture in the body of the pancreas traversed      with a wire, but no device could be advanced across.      - Previously placed stents removed, no extravasation of contrast seen.  Recommendation:      - Discharge patient to home.      - Return to GI clinic in 1 month.      - Likely no utility in treating the stricture given parenchymal atrophy      in the tail unless more fluid collections develop in the future.  Procedure Code(s):      --- Professional ---      77690, Endoscopic retrograde cholangiopancreatography (ERCP);      diagnostic, including collection of specimen(s) by brushing or washing,      when performed (separate procedure)      90508, Esophagogastroduodenoscopy, flexible, transoral; with removal of      foreign body(s)  Diagnosis Code(s):      --- Professional ---      K86.89, Other specified diseases of pancreas      Z46.59, Encounter for fitting and adjustment of other gastrointestinal      appliance and device      K86.3, Pseudocyst of pancreas  CPT copyright 2022 American Medical Association. All rights reserved.  The codes documented in this report are preliminary and upon  review may  be revised to meet current compliance requirements.  Bert Castillo MD  7/21/2023 5:11:25 PM  Number of Addenda: 0  Note Initiated On: 7/21/2023 10:58 AM      [address] [id number]              Simon Lay MD

## 2024-06-17 NOTE — NURSING NOTE
Is the patient currently in the state of [place]? YES    Visit mode:VIDEO    If the visit is dropped, the patient can be reconnected by: VIDEO VISIT: Text to cell phone:   Telephone Information:   Mobile [phone]       Will anyone else be joining the visit? NO  (If patient encounters technical issues they should call [phone] :[phone])    How would you like to obtain your AVS? MyChart    Are changes needed to the allergy or medication list? No    Are refills needed on medications prescribed by this physician? NO    Reason for visit: PASHA TOTH

## 2024-06-17 NOTE — LETTER
6/17/2024      Abiel Weeks  [address]  [place] [address]      Dear Colleague,    Thank you for referring your patient, Abiel Weeks, to the [facility] [place]. Please see a copy of my visit note below.      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  He has had multiple bouts of recurrent acute pancreatitis a stricture in the partially atrophied body tail with upstream pseudocyst that comes and goes.  He is had transluminal drainage of the pseudocyst in the past at [facility] but does not have indwelling double-pigtail stents.  Dr. Castillo tried ERCP with trans papillary drainage through a tight stricture in the body tail but was unable to advance anything over the wire.  Patient did develop some post ERCP pancreatitis and pain.  He was ultimately referred to us through his mother's friend who had had care here and we saw him for the first time in May last month and arranged to secretin MRCP, a fecal elastase and started him on pancreatic enzyme therapy for ongoing meal related pain.  He now feels significantly but not adequately better on the enzymes with more tolerance of food but still has nagging left upper quadrant pain.  The elastase was not completed MRCP is as below.  There is comment of atrophy of the tail with a slightly enlarging pseudocyst that is about 3 cm now.  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  Since this problem has been recurring over the years since his initial bout of necrotizing gallstone pancreatitis it seems appropriate to pursue more aggressive therapy.  I would suggest that we consider transluminal drainage of the walled off collection with cystotome and double-pigtail stents meant to stay in indefinitely.  If  that turns out not to be feasible then I would offer him an ERCP with trans papillary stricture dilation and stenting but only as a temporizing measure since we do not want him to become stent dependent for life.  We went over the risks of these therapies including pancreatitis infection bleeding perforation etc. but the alternative of distal pancreatectomy splenectomy seems very suboptimal and this debilitated patient with heart disease and elevated BMI of 36 and diabetes  Patient would like to pursue this course    Plan is  #1 reviewed the case with Dr. Villafuerte  #2 pending agreement schedule him for EUS with transluminal drainage of the pseudocyst in the tail if feasible but if it is not safe for her double-pigtail stenting then proceed with ERCP with trans papillary stricture dilation and stenting          Joined the call at 6/17/2024, 11:38:58 am.  Left the call at 6/17/2024, 11:51:42 am.  You were on the call for 12 minutes 44 seconds .  20 minutes additional reviewing images that are uploaded, history, outside records     Study Result    Narrative & Impression   MRI ABDOMEN      CLINICAL HISTORY: Chronic recurrent pancreatitis (H)     DATE: 5/30/2024 8:21 AM     TECHNIQUE:      TECHNIQUE: Coronal T2 HASTE, sagittal T2 HASTE, axial T2 STIR, axial  ARNULFO T2, In-phase and Out-of-phase axial breath-hold FLASH,  diffusion-weighted, and T1-weighted VIBE axial fat saturation images  were obtained. Thick and thin slab heavily T2-weighted MRCP images  were obtained. 3-D reformatted images were created by the  technologist. Following administration of secretin, T2-weighted HASTE  images were obtained at 1 minute intervals to 7 minutes, and an  additional 10 minutes image was also obtained. Contrast: 12 mL  Intravenous.     Comparison study: CT abdomen and pelvis dated 1/23/2024.     FINDINGS:     There is no pancreas divisum. The pancreatic duct does not appear  abnormally dilated, and 1-2 sidebranches are visualized in  the tail  region.      Pancreatic duct measures 2 mm prior to administration of secretin.  Following administration of secretin, it dilates to maximum diameter  of 3.5 mm.       There is normal exocrine function, with contrast seen in the fourth  portion of the duodenum at 10 minutes following secretin  administration.     No intra-or extra hepatic biliary dilation.  The common bile duct  measures 4 mm.      Pancreas: Preserved intrinsic T1 signal in the head and proximal body,  loss of normal signal in the distal body and tail. No evidence of  peripancreatic inflammation. There is moderate atrophy of the distal  pancreatic body and tail with redemonstrated T2 hyperintense  pseudocyst in the pancreatic tail measuring 3.1 x 2.4 cm (10/35),  previously 2.5 x 2 cm.     Liver: Mildly enlarged at 21 cm in craniocaudal dimension. No  significant fat or iron deposition. No focal mass.      Gallbladder: Cholecystectomy.     Spleen: Normal.     Kidneys: No hydronephrosis. Few punctate T2 hyperintense cortical  cysts.     Adrenal glands: Normal.     Bowel: Normal caliber small and large bowel. Diffuse gastric wall  thickening.      Lymph nodes: No adenopathy.     Blood vessels: Patent major abdominal vasculature. Few dilated  perisplenic collaterals.      Lung bases: Normal.     Bones and soft tissues: Normal.     Mesentery and abdominal wall: Small fat-containing umbilical hernia.     Ascites: Not present.                                                                      IMPRESSION:   1. Slight interval enlargement of pseudocyst in the pancreatic tail  region measuring up to 3.1 cm, previously 2.5 cm.   2. Moderate atrophy of the distal pancreatic body and tail with few  dilated sidebranches in the tail region. Main pancreatic duct is  non-dilated.   3. Normal pancreatic exocrine function.  4. Diffuse gastric wall thickening may relate to gastritis.      CAMILLE LOPEZ MD    Miscellaneous Results - Endoscopy (07/21/2023  10:58 AM T)  Narrative   TEST - 07/21/2023 5:11 PM CDT   [facility]  Patient Name: Abiel Weeks  Procedure Date: 7/21/2023 10:58 AM  MRN: [id number]  Account Number: [id number]  YOB: 1985  Note Status: Finalized  Attending MD: Bert Castillo MD,  Procedure:      ERCP  Indications:      38 y.o. with known acute recurrent pancreatitis, pseudocyst in 2019 s/p      endoscopic drainage at that point, did well until recently when      developed acute recurrent pancreatitis again with a likely pseudocyst in      the tail of the pancreas. Underwent an ERCP 2 weeks ago where a high      grade stricture was found in the body of the pancreas and could not be      traversed. EUS at that pont confirmed a stricture, atrophy of the      pancreatic parenchyma in the tail, and transmural drainage of the      pseudocyst performed with removal of a significant amount of pus. Doing      well since then, plan ERCP to potentially traverse the stricture and      remove cystgastrostomy stents.  Providers:      Bert Castillo MD, Ariela Middleton RN, Germaine Gann RN, Bert Castillo MD  Requesting Provider:    Medicines:      General Anesthesia  Complications:      No immediate complications. Estimated blood loss: Minimal.  Procedure:      Pre-Anesthesia Assessment:      - Prior to the procedure, a History and Physical was performed, and      patient medications and allergies were reviewed. The patient is      competent. The risks and benefits of the procedure and the sedation      options and risks were discussed with the patient. All questions were      answered and informed consent was obtained. Patient identification and      proposed procedure were verified by the physician, the nurse and the      anesthetist in the pre-procedure area in the procedure room. Mental      Status Examination: alert and oriented. Prophylactic Antibiotics: The      patient does not require prophylactic antibiotics. Prior  Anticoagulants:      The patient has taken no anticoagulant or antiplatelet agents. ASA Grade      Assessment: III - A patient with severe systemic disease. After      reviewing the risks and benefits, the patient was deemed in satisfactory      condition to undergo the procedure. The anesthesia plan was to use      general anesthesia. Immediately prior to administration of medications,      the patient was re-assessed for adequacy to receive sedatives. The heart      rate, respiratory rate, oxygen saturations, blood pressure, adequacy of      pulmonary ventilation, and response to care were monitored throughout      the procedure. The physical status of the patient was re-assessed after      the procedure.      After obtaining informed consent, the scope was passed under direct      vision. Throughout the procedure, the patient's blood pressure, pulse,      and oxygen saturations were monitored continuously. The Duodenoscope was      introduced through the mouth, and advanced to the duodenum and used to      inject contrast into the ventral pancreatic duct. The ERCP was      accomplished without difficulty. The patient tolerated the procedure      well.  Findings:      The  film was normal. The esophagus was successfully intubated      under direct vision. The scope was advanced to a normal major papilla in      the descending duodenum without detailed examination of the pharynx,      larynx and associated structures, and upper GI tract. The upper GI tract      was grossly normal. The ventral pancreatic duct was deeply cannulated      with the short-nosed traction sphincterotome. Contrast was injected. The      pancreatic duct in the body of the pancreas contained a severe stenosis.      A straight 0.021 inch Tracer [employer] Direct wire was passed into the      ventral pancreatic duct and advanced past the stricture. Contrast was      injected through a 5-4-3 cannula, and a very small portion of the PD       filled just upstream from the stricture. The guidewire was clearly      advanced through this area and to the tail of the pancreas, but no      device could be advanced across the stricture.      Both stents were removed from the pseudocyst using a rat-toothed      forceps. The adult gastroscope was advanced to the cystgastrostomy and      contrast was injected. There was no extravasation of contrast, and the      PD did not fill.  Impression:      - High grade pancreatic stricture in the body of the pancreas traversed      with a wire, but no device could be advanced across.      - Previously placed stents removed, no extravasation of contrast seen.  Recommendation:      - Discharge patient to home.      - Return to GI clinic in 1 month.      - Likely no utility in treating the stricture given parenchymal atrophy      in the tail unless more fluid collections develop in the future.  Procedure Code(s):      --- Professional ---      46413, Endoscopic retrograde cholangiopancreatography (ERCP);      diagnostic, including collection of specimen(s) by brushing or washing,      when performed (separate procedure)      71477, Esophagogastroduodenoscopy, flexible, transoral; with removal of      foreign body(s)  Diagnosis Code(s):      --- Professional ---      K86.89, Other specified diseases of pancreas      Z46.59, Encounter for fitting and adjustment of other gastrointestinal      appliance and device      K86.3, Pseudocyst of pancreas  CPT copyright 2022 American Medical Association. All rights reserved.  The codes documented in this report are preliminary and upon  review may  be revised to meet current compliance requirements.  Bert Castillo MD  7/21/2023 5:11:25 PM  Number of Addenda: 0  Note Initiated On: 7/21/2023 10:58 AM      [address] [address]            Again, thank you for allowing me to participate in the care of your patient.      Sincerely,    Simon Saunders  MD Al[place]

## 2024-06-20 ENCOUNTER — PREP FOR PROCEDURE (OUTPATIENT)
Dept: GASTROENTEROLOGY | Facility: CLINIC | Age: 39
End: 2024-06-20
Payer: COMMERCIAL

## 2024-06-20 ENCOUNTER — PATIENT OUTREACH (OUTPATIENT)
Dept: GASTROENTEROLOGY | Facility: CLINIC | Age: 39
End: 2024-06-20
Payer: COMMERCIAL

## 2024-06-20 DIAGNOSIS — K86.1 CHRONIC PANCREATITIS (H): Primary | ICD-10-CM

## 2024-06-20 NOTE — TELEPHONE ENCOUNTER
Called patient to discuss combo with Shira    Please assist in scheduling:     Procedure/Imaging/Clinic: EUS/ERCP  Physician: Shira - in Alireza's room  Timin/18  Scope time needed:standard EUS time, ERCP time TBD  Anesthesia:General  Dx: chronic pancreatitis  Tier:Tier 3 -   Surgeries/procedures that can be delayed  between 30 to 90 days with no significant  morbidity/mortality to patient or impact on  patient/disease outcome.   Location: [facility]  Header of letter for pt communication: Endoscopic Ultrasound, EUS and Endoscopic Retrograde Cholangiopancreatography (ERCP)      Called to discuss with patient.     Explained they can expect a call from  for date of procedure, OR will call 1-2 days prior to procedure date with arrival time, will need a , someone to stay with them for 24 hours and should stay in town for 24 hours (within 45 min of Hospital) post procedure    Patient needs to get pre-op physical completed. If outside  health system will need physical faxed to number [phone]     If you do not get a preop physical, your procedure could be cancelled, patient voiced understanding*    Preop Plan:PCP will do, Brandon Garcia confirmed    Does patient have any history of gastric bypass/gastric surgery/altered panc/bili anatomy?no    Any recent Covid symptoms or positive covid test?no    Does patient have [employer] insurance?:no    Med Review    Blood thinner -  no  ASA - no  Diabetic - no  Any meds by injection or mouth for weight loss or diabetes-none    Patient Education r/t procedure:done    A pre-op nurse will call 1-2 days prior to the procedure.    NPO/Prep:   Adults and Children of all ages may consume solids up to 8 hours prior to arrival time - may consume clear liquids up to 1 hour prior to arrival time.    Other specific details/comments:none     Verbalized understanding of all instructions. All questions answered.     Procedure order placed, message routed to OR /  Endo

## 2024-07-16 RX ORDER — ALBUTEROL SULFATE 90 UG/1
2 AEROSOL, METERED RESPIRATORY (INHALATION) EVERY 4 HOURS PRN
COMMUNITY

## 2024-07-18 ENCOUNTER — ANESTHESIA EVENT (OUTPATIENT)
Dept: SURGERY | Facility: CLINIC | Age: 39
End: 2024-07-18
Payer: COMMERCIAL

## 2024-07-18 ENCOUNTER — ANESTHESIA (OUTPATIENT)
Dept: SURGERY | Facility: CLINIC | Age: 39
End: 2024-07-18
Payer: COMMERCIAL

## 2024-07-18 ENCOUNTER — APPOINTMENT (OUTPATIENT)
Dept: GENERAL RADIOLOGY | Facility: CLINIC | Age: 39
End: 2024-07-18
Attending: INTERNAL MEDICINE
Payer: COMMERCIAL

## 2024-07-18 ENCOUNTER — HOSPITAL ENCOUNTER (OUTPATIENT)
Facility: CLINIC | Age: 39
Discharge: HOME | End: 2024-07-18
Attending: INTERNAL MEDICINE | Admitting: INTERNAL MEDICINE
Payer: COMMERCIAL

## 2024-07-18 VITALS
HEIGHT: 72 IN | DIASTOLIC BLOOD PRESSURE: 63 MMHG | WEIGHT: 260.14 LBS | HEART RATE: 76 BPM | TEMPERATURE: 99 F | OXYGEN SATURATION: 97 % | RESPIRATION RATE: 16 BRPM | SYSTOLIC BLOOD PRESSURE: 116 MMHG | BODY MASS INDEX: 35.24 KG/M2

## 2024-07-18 DIAGNOSIS — G89.18 ACUTE POST-OPERATIVE PAIN: ICD-10-CM

## 2024-07-18 LAB
ALBUMIN SERPL BCG-MCNC: 4.3 G/DL (ref 3.5–5.2)
ALP SERPL-CCNC: 68 U/L (ref 40–150)
ALT SERPL W P-5'-P-CCNC: <5 U/L (ref 0–70)
ANION GAP SERPL CALCULATED.3IONS-SCNC: 13 MMOL/L (ref 7–15)
AST SERPL W P-5'-P-CCNC: 18 U/L (ref 0–45)
BASOPHILS # BLD AUTO: 0.1 10E3/UL (ref 0–0.2)
BASOPHILS NFR BLD AUTO: 0 %
BILIRUB SERPL-MCNC: 0.7 MG/DL
BUN SERPL-MCNC: 18.6 MG/DL (ref 6–20)
CALCIUM SERPL-MCNC: 9 MG/DL (ref 8.8–10.4)
CHLORIDE SERPL-SCNC: 103 MMOL/L (ref 98–107)
CREAT SERPL-MCNC: 1.45 MG/DL (ref 0.67–1.17)
EGFRCR SERPLBLD CKD-EPI 2021: 62 ML/MIN/1.73M2
EOSINOPHIL # BLD AUTO: 0 10E3/UL (ref 0–0.7)
EOSINOPHIL NFR BLD AUTO: 0 %
ERYTHROCYTE [DISTWIDTH] IN BLOOD BY AUTOMATED COUNT: 13.2 % (ref 10–15)
GLUCOSE BLDC GLUCOMTR-MCNC: 121 MG/DL (ref 70–99)
GLUCOSE BLDC GLUCOMTR-MCNC: 137 MG/DL (ref 70–99)
GLUCOSE SERPL-MCNC: 129 MG/DL (ref 70–99)
HCO3 SERPL-SCNC: 22 MMOL/L (ref 22–29)
HCT VFR BLD AUTO: 40.1 % (ref 40–53)
HGB BLD-MCNC: 13.6 G/DL (ref 13.3–17.7)
IMM GRANULOCYTES # BLD: 0 10E3/UL
IMM GRANULOCYTES NFR BLD: 0 %
LIPASE SERPL-CCNC: 49 U/L (ref 13–60)
LYMPHOCYTES # BLD AUTO: 1 10E3/UL (ref 0.8–5.3)
LYMPHOCYTES NFR BLD AUTO: 7 %
MCH RBC QN AUTO: 30.7 PG (ref 26.5–33)
MCHC RBC AUTO-ENTMCNC: 33.9 G/DL (ref 31.5–36.5)
MCV RBC AUTO: 91 FL (ref 78–100)
MONOCYTES # BLD AUTO: 0.3 10E3/UL (ref 0–1.3)
MONOCYTES NFR BLD AUTO: 2 %
NEUTROPHILS # BLD AUTO: 12.2 10E3/UL (ref 1.6–8.3)
NEUTROPHILS NFR BLD AUTO: 91 %
NRBC # BLD AUTO: 0 10E3/UL
NRBC BLD AUTO-RTO: 0 /100
PLATELET # BLD AUTO: 153 10E3/UL (ref 150–450)
POTASSIUM SERPL-SCNC: 3.9 MMOL/L (ref 3.4–5.3)
PROT SERPL-MCNC: 7 G/DL (ref 6.4–8.3)
RBC # BLD AUTO: 4.43 10E6/UL (ref 4.4–5.9)
SODIUM SERPL-SCNC: 138 MMOL/L (ref 135–145)
WBC # BLD AUTO: 13.6 10E3/UL (ref 4–11)

## 2024-07-18 PROCEDURE — 250N000011 HC RX IP 250 OP 636: Performed by: ANESTHESIOLOGY

## 2024-07-18 PROCEDURE — C1877 STENT, NON-COAT/COV W/O DEL: HCPCS | Performed by: INTERNAL MEDICINE

## 2024-07-18 PROCEDURE — 36415 COLL VENOUS BLD VENIPUNCTURE: CPT | Performed by: INTERNAL MEDICINE

## 2024-07-18 PROCEDURE — 84450 TRANSFERASE (AST) (SGOT): CPT | Performed by: INTERNAL MEDICINE

## 2024-07-18 PROCEDURE — 43240 EGD W/TRANSMURAL DRAIN CYST: CPT | Performed by: NURSE ANESTHETIST, CERTIFIED REGISTERED

## 2024-07-18 PROCEDURE — C1769 GUIDE WIRE: HCPCS | Performed by: INTERNAL MEDICINE

## 2024-07-18 PROCEDURE — 250N000009 HC RX 250: Performed by: NURSE ANESTHETIST, CERTIFIED REGISTERED

## 2024-07-18 PROCEDURE — 82962 GLUCOSE BLOOD TEST: CPT

## 2024-07-18 PROCEDURE — 710N000010 HC RECOVERY PHASE 1, LEVEL 2, PER MIN: Performed by: INTERNAL MEDICINE

## 2024-07-18 PROCEDURE — 255N000002 HC RX 255 OP 636: Performed by: INTERNAL MEDICINE

## 2024-07-18 PROCEDURE — 250N000011 HC RX IP 250 OP 636: Performed by: INTERNAL MEDICINE

## 2024-07-18 PROCEDURE — 85004 AUTOMATED DIFF WBC COUNT: CPT | Performed by: INTERNAL MEDICINE

## 2024-07-18 PROCEDURE — C1874 STENT, COATED/COV W/DEL SYS: HCPCS | Performed by: INTERNAL MEDICINE

## 2024-07-18 PROCEDURE — 250N000025 HC SEVOFLURANE, PER MIN: Performed by: INTERNAL MEDICINE

## 2024-07-18 PROCEDURE — 83690 ASSAY OF LIPASE: CPT | Performed by: INTERNAL MEDICINE

## 2024-07-18 PROCEDURE — 999N000179 XR SURGERY CARM FLUORO LESS THAN 5 MIN W STILLS: Mod: TC

## 2024-07-18 PROCEDURE — 43240 EGD W/TRANSMURAL DRAIN CYST: CPT | Performed by: ANESTHESIOLOGY

## 2024-07-18 PROCEDURE — 710N000012 HC RECOVERY PHASE 2, PER MINUTE: Performed by: INTERNAL MEDICINE

## 2024-07-18 PROCEDURE — 370N000017 HC ANESTHESIA TECHNICAL FEE, PER MIN: Performed by: INTERNAL MEDICINE

## 2024-07-18 PROCEDURE — 250N000011 HC RX IP 250 OP 636: Performed by: NURSE ANESTHETIST, CERTIFIED REGISTERED

## 2024-07-18 PROCEDURE — 258N000003 HC RX IP 258 OP 636: Performed by: NURSE ANESTHETIST, CERTIFIED REGISTERED

## 2024-07-18 PROCEDURE — 999N000141 HC STATISTIC PRE-PROCEDURE NURSING ASSESSMENT: Performed by: INTERNAL MEDICINE

## 2024-07-18 PROCEDURE — 82040 ASSAY OF SERUM ALBUMIN: CPT | Performed by: INTERNAL MEDICINE

## 2024-07-18 PROCEDURE — 272N000001 HC OR GENERAL SUPPLY STERILE: Performed by: INTERNAL MEDICINE

## 2024-07-18 PROCEDURE — 360N000075 HC SURGERY LEVEL 2, PER MIN: Performed by: INTERNAL MEDICINE

## 2024-07-18 DEVICE — STENT AND ELECTROCAUTERY - ENHANCED DELIVERY SYSTEM
Type: IMPLANTABLE DEVICE | Site: STOMACH | Status: NON-FUNCTIONAL
Brand: AXIOS™
Removed: 2024-07-25

## 2024-07-18 DEVICE — IMPLANTABLE DEVICE: Type: IMPLANTABLE DEVICE | Site: STOMACH | Status: FUNCTIONAL

## 2024-07-18 RX ORDER — ONDANSETRON 4 MG/1
4 TABLET, ORALLY DISINTEGRATING ORAL EVERY 30 MIN PRN
Status: DISCONTINUED | OUTPATIENT
Start: 2024-07-18 | End: 2024-07-18 | Stop reason: HOSPADM

## 2024-07-18 RX ORDER — NALOXONE HYDROCHLORIDE 0.4 MG/ML
0.2 INJECTION, SOLUTION INTRAMUSCULAR; INTRAVENOUS; SUBCUTANEOUS
Status: DISCONTINUED | OUTPATIENT
Start: 2024-07-18 | End: 2024-07-18 | Stop reason: HOSPADM

## 2024-07-18 RX ORDER — NALOXONE HYDROCHLORIDE 0.4 MG/ML
0.1 INJECTION, SOLUTION INTRAMUSCULAR; INTRAVENOUS; SUBCUTANEOUS
Status: DISCONTINUED | OUTPATIENT
Start: 2024-07-18 | End: 2024-07-18 | Stop reason: HOSPADM

## 2024-07-18 RX ORDER — DEXAMETHASONE SODIUM PHOSPHATE 4 MG/ML
INJECTION, SOLUTION INTRA-ARTICULAR; INTRALESIONAL; INTRAMUSCULAR; INTRAVENOUS; SOFT TISSUE PRN
Status: DISCONTINUED | OUTPATIENT
Start: 2024-07-18 | End: 2024-07-18

## 2024-07-18 RX ORDER — FENTANYL CITRATE 50 UG/ML
50 INJECTION, SOLUTION INTRAMUSCULAR; INTRAVENOUS EVERY 5 MIN PRN
Status: DISCONTINUED | OUTPATIENT
Start: 2024-07-18 | End: 2024-07-18 | Stop reason: HOSPADM

## 2024-07-18 RX ORDER — LIDOCAINE 40 MG/G
CREAM TOPICAL
Status: DISCONTINUED | OUTPATIENT
Start: 2024-07-18 | End: 2024-07-18 | Stop reason: HOSPADM

## 2024-07-18 RX ORDER — ONDANSETRON 2 MG/ML
4 INJECTION INTRAMUSCULAR; INTRAVENOUS EVERY 6 HOURS PRN
Status: DISCONTINUED | OUTPATIENT
Start: 2024-07-18 | End: 2024-07-18 | Stop reason: HOSPADM

## 2024-07-18 RX ORDER — SODIUM CHLORIDE, SODIUM LACTATE, POTASSIUM CHLORIDE, CALCIUM CHLORIDE 600; 310; 30; 20 MG/100ML; MG/100ML; MG/100ML; MG/100ML
INJECTION, SOLUTION INTRAVENOUS CONTINUOUS
Status: DISCONTINUED | OUTPATIENT
Start: 2024-07-18 | End: 2024-07-18 | Stop reason: HOSPADM

## 2024-07-18 RX ORDER — PROPOFOL 10 MG/ML
INJECTION, EMULSION INTRAVENOUS PRN
Status: DISCONTINUED | OUTPATIENT
Start: 2024-07-18 | End: 2024-07-18

## 2024-07-18 RX ORDER — OXYCODONE HYDROCHLORIDE 10 MG/1
10 TABLET ORAL
Status: DISCONTINUED | OUTPATIENT
Start: 2024-07-18 | End: 2024-07-18 | Stop reason: HOSPADM

## 2024-07-18 RX ORDER — ONDANSETRON 4 MG/1
4 TABLET, ORALLY DISINTEGRATING ORAL EVERY 6 HOURS PRN
Status: DISCONTINUED | OUTPATIENT
Start: 2024-07-18 | End: 2024-07-18 | Stop reason: HOSPADM

## 2024-07-18 RX ORDER — FLUMAZENIL 0.1 MG/ML
0.2 INJECTION, SOLUTION INTRAVENOUS
Status: DISCONTINUED | OUTPATIENT
Start: 2024-07-18 | End: 2024-07-18 | Stop reason: HOSPADM

## 2024-07-18 RX ORDER — DEXAMETHASONE SODIUM PHOSPHATE 4 MG/ML
4 INJECTION, SOLUTION INTRA-ARTICULAR; INTRALESIONAL; INTRAMUSCULAR; INTRAVENOUS; SOFT TISSUE
Status: DISCONTINUED | OUTPATIENT
Start: 2024-07-18 | End: 2024-07-18 | Stop reason: HOSPADM

## 2024-07-18 RX ORDER — LEVOFLOXACIN 5 MG/ML
INJECTION, SOLUTION INTRAVENOUS PRN
Status: DISCONTINUED | OUTPATIENT
Start: 2024-07-18 | End: 2024-07-18

## 2024-07-18 RX ORDER — FENTANYL CITRATE 50 UG/ML
25 INJECTION, SOLUTION INTRAMUSCULAR; INTRAVENOUS EVERY 5 MIN PRN
Status: DISCONTINUED | OUTPATIENT
Start: 2024-07-18 | End: 2024-07-18 | Stop reason: HOSPADM

## 2024-07-18 RX ORDER — LABETALOL HYDROCHLORIDE 5 MG/ML
10 INJECTION, SOLUTION INTRAVENOUS
Status: DISCONTINUED | OUTPATIENT
Start: 2024-07-18 | End: 2024-07-18 | Stop reason: HOSPADM

## 2024-07-18 RX ORDER — IOPAMIDOL 510 MG/ML
INJECTION, SOLUTION INTRAVASCULAR PRN
Status: DISCONTINUED | OUTPATIENT
Start: 2024-07-18 | End: 2024-07-18 | Stop reason: HOSPADM

## 2024-07-18 RX ORDER — ONDANSETRON 2 MG/ML
4 INJECTION INTRAMUSCULAR; INTRAVENOUS EVERY 30 MIN PRN
Status: DISCONTINUED | OUTPATIENT
Start: 2024-07-18 | End: 2024-07-18 | Stop reason: HOSPADM

## 2024-07-18 RX ORDER — OXYCODONE HYDROCHLORIDE 5 MG/1
5 TABLET ORAL EVERY 6 HOURS PRN
Qty: 9 TABLET | Refills: 0 | Status: SHIPPED | OUTPATIENT
Start: 2024-07-18

## 2024-07-18 RX ORDER — HALOPERIDOL 5 MG/ML
1 INJECTION INTRAMUSCULAR
Status: DISCONTINUED | OUTPATIENT
Start: 2024-07-18 | End: 2024-07-18 | Stop reason: HOSPADM

## 2024-07-18 RX ORDER — OXYCODONE HYDROCHLORIDE 5 MG/1
5 TABLET ORAL EVERY 6 HOURS PRN
Qty: 9 TABLET | Refills: 0 | Status: SHIPPED | OUTPATIENT
Start: 2024-07-18 | End: 2024-07-18

## 2024-07-18 RX ORDER — HYDRALAZINE HYDROCHLORIDE 20 MG/ML
2.5-5 INJECTION INTRAMUSCULAR; INTRAVENOUS EVERY 10 MIN PRN
Status: DISCONTINUED | OUTPATIENT
Start: 2024-07-18 | End: 2024-07-18 | Stop reason: HOSPADM

## 2024-07-18 RX ORDER — LIDOCAINE HYDROCHLORIDE 20 MG/ML
INJECTION, SOLUTION INFILTRATION; PERINEURAL PRN
Status: DISCONTINUED | OUTPATIENT
Start: 2024-07-18 | End: 2024-07-18

## 2024-07-18 RX ORDER — ONDANSETRON 2 MG/ML
INJECTION INTRAMUSCULAR; INTRAVENOUS PRN
Status: DISCONTINUED | OUTPATIENT
Start: 2024-07-18 | End: 2024-07-18

## 2024-07-18 RX ORDER — SODIUM CHLORIDE, SODIUM LACTATE, POTASSIUM CHLORIDE, CALCIUM CHLORIDE 600; 310; 30; 20 MG/100ML; MG/100ML; MG/100ML; MG/100ML
INJECTION, SOLUTION INTRAVENOUS CONTINUOUS PRN
Status: DISCONTINUED | OUTPATIENT
Start: 2024-07-18 | End: 2024-07-18

## 2024-07-18 RX ORDER — EPHEDRINE SULFATE 50 MG/ML
INJECTION, SOLUTION INTRAMUSCULAR; INTRAVENOUS; SUBCUTANEOUS PRN
Status: DISCONTINUED | OUTPATIENT
Start: 2024-07-18 | End: 2024-07-18

## 2024-07-18 RX ORDER — NALOXONE HYDROCHLORIDE 0.4 MG/ML
0.4 INJECTION, SOLUTION INTRAMUSCULAR; INTRAVENOUS; SUBCUTANEOUS
Status: DISCONTINUED | OUTPATIENT
Start: 2024-07-18 | End: 2024-07-18 | Stop reason: HOSPADM

## 2024-07-18 RX ORDER — HYDROMORPHONE HCL IN WATER/PF 6 MG/30 ML
0.2 PATIENT CONTROLLED ANALGESIA SYRINGE INTRAVENOUS EVERY 5 MIN PRN
Status: DISCONTINUED | OUTPATIENT
Start: 2024-07-18 | End: 2024-07-18 | Stop reason: HOSPADM

## 2024-07-18 RX ORDER — FENTANYL CITRATE 50 UG/ML
INJECTION, SOLUTION INTRAMUSCULAR; INTRAVENOUS PRN
Status: DISCONTINUED | OUTPATIENT
Start: 2024-07-18 | End: 2024-07-18

## 2024-07-18 RX ORDER — OXYCODONE HYDROCHLORIDE 5 MG/1
5 TABLET ORAL
Status: DISCONTINUED | OUTPATIENT
Start: 2024-07-18 | End: 2024-07-18 | Stop reason: HOSPADM

## 2024-07-18 RX ORDER — HYDROMORPHONE HCL IN WATER/PF 6 MG/30 ML
0.4 PATIENT CONTROLLED ANALGESIA SYRINGE INTRAVENOUS EVERY 5 MIN PRN
Status: DISCONTINUED | OUTPATIENT
Start: 2024-07-18 | End: 2024-07-18 | Stop reason: HOSPADM

## 2024-07-18 RX ADMIN — FENTANYL CITRATE 25 MCG: 50 INJECTION, SOLUTION INTRAMUSCULAR; INTRAVENOUS at 12:18

## 2024-07-18 RX ADMIN — ONDANSETRON 4 MG: 2 INJECTION INTRAMUSCULAR; INTRAVENOUS at 11:06

## 2024-07-18 RX ADMIN — PHENYLEPHRINE HYDROCHLORIDE 100 MCG: 10 INJECTION INTRAVENOUS at 10:08

## 2024-07-18 RX ADMIN — LIDOCAINE HYDROCHLORIDE 100 MG: 20 INJECTION, SOLUTION INFILTRATION; PERINEURAL at 09:42

## 2024-07-18 RX ADMIN — LEVOFLOXACIN 500 MG: 5 INJECTION, SOLUTION INTRAVENOUS at 09:53

## 2024-07-18 RX ADMIN — FENTANYL CITRATE 50 MCG: 50 INJECTION, SOLUTION INTRAMUSCULAR; INTRAVENOUS at 11:46

## 2024-07-18 RX ADMIN — FENTANYL CITRATE 100 MCG: 50 INJECTION INTRAMUSCULAR; INTRAVENOUS at 09:42

## 2024-07-18 RX ADMIN — MIDAZOLAM 2 MG: 1 INJECTION INTRAMUSCULAR; INTRAVENOUS at 09:31

## 2024-07-18 RX ADMIN — SODIUM CHLORIDE, POTASSIUM CHLORIDE, SODIUM LACTATE AND CALCIUM CHLORIDE: 600; 310; 30; 20 INJECTION, SOLUTION INTRAVENOUS at 09:30

## 2024-07-18 RX ADMIN — PHENYLEPHRINE HYDROCHLORIDE 100 MCG: 10 INJECTION INTRAVENOUS at 10:19

## 2024-07-18 RX ADMIN — PROPOFOL 200 MG: 10 INJECTION, EMULSION INTRAVENOUS at 09:42

## 2024-07-18 RX ADMIN — DEXAMETHASONE SODIUM PHOSPHATE 6 MG: 4 INJECTION, SOLUTION INTRA-ARTICULAR; INTRALESIONAL; INTRAMUSCULAR; INTRAVENOUS; SOFT TISSUE at 10:08

## 2024-07-18 RX ADMIN — EPHEDRINE SULFATE 5 MG: 5 INJECTION INTRAVENOUS at 10:44

## 2024-07-18 RX ADMIN — FENTANYL CITRATE 25 MCG: 50 INJECTION INTRAMUSCULAR; INTRAVENOUS at 13:18

## 2024-07-18 RX ADMIN — SODIUM CHLORIDE, POTASSIUM CHLORIDE, SODIUM LACTATE AND CALCIUM CHLORIDE: 600; 310; 30; 20 INJECTION, SOLUTION INTRAVENOUS at 11:24

## 2024-07-18 RX ADMIN — EPHEDRINE SULFATE 5 MG: 5 INJECTION INTRAVENOUS at 10:53

## 2024-07-18 RX ADMIN — PHENYLEPHRINE HYDROCHLORIDE 100 MCG: 10 INJECTION INTRAVENOUS at 10:31

## 2024-07-18 RX ADMIN — FENTANYL CITRATE 25 MCG: 50 INJECTION, SOLUTION INTRAMUSCULAR; INTRAVENOUS at 11:37

## 2024-07-18 RX ADMIN — Medication 200 MG: at 11:13

## 2024-07-18 RX ADMIN — Medication 50 MG: at 09:42

## 2024-07-18 RX ADMIN — ONDANSETRON 4 MG: 2 INJECTION INTRAMUSCULAR; INTRAVENOUS at 11:49

## 2024-07-18 RX ADMIN — EPHEDRINE SULFATE 5 MG: 5 INJECTION INTRAVENOUS at 10:47

## 2024-07-18 RX ADMIN — EPHEDRINE SULFATE 5 MG: 5 INJECTION INTRAVENOUS at 10:49

## 2024-07-18 RX ADMIN — EPHEDRINE SULFATE 5 MG: 5 INJECTION INTRAVENOUS at 10:45

## 2024-07-18 ASSESSMENT — ACTIVITIES OF DAILY LIVING (ADL)
ADLS_ACUITY_SCORE: 31

## 2024-07-18 NOTE — PROGRESS NOTES
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 He saw Dr. Lay and was referred for EUS with transluminal drainage of the pseudocyst in the tail.     Ref: Dr. Simon Lay

## 2024-07-18 NOTE — ANESTHESIA POSTPROCEDURE EVALUATION
Patient: Abiel Weeks    Procedure: Procedure(s):  ENDOSCOPIC ULTRASOUND, ESOPHAGOSCOPY / UPPER GASTROINTESTINAL TRACT (GI) with cystgastrostomy       Anesthesia Type:  General    Note:  Disposition: Outpatient   Postop Pain Control: Uneventful            Sign Out: Well controlled pain   PONV: No   Neuro/Psych: Uneventful            Sign Out: Acceptable/Baseline neuro status   Airway/Respiratory: Uneventful            Sign Out: Acceptable/Baseline resp. status   CV/Hemodynamics: Uneventful            Sign Out: Acceptable CV status; No obvious hypovolemia; No obvious fluid overload   Other NRE: NONE   DID A NON-ROUTINE EVENT OCCUR? No           Last vitals:  Vitals Value Taken Time   /72 07/18/24 1145   Temp 37.4  C (99.3  F) 07/18/24 1122   Pulse 77 07/18/24 1151   Resp 22 07/18/24 1151   SpO2 95 % 07/18/24 1151   Vitals shown include unfiled device data.    Electronically Signed By: Jose Rios MD  July 18, 2024  11:51 AM

## 2024-07-18 NOTE — ANESTHESIA PREPROCEDURE EVALUATION
Anesthesia Pre-Procedure Evaluation    Patient: Abiel Weeks   MRN: [id number] : 1985        Procedure : Procedure(s):  ENDOSCOPIC ULTRASOUND, ESOPHAGOSCOPY / UPPER GASTROINTESTINAL TRACT (GI)  ENDOSCOPIC RETROGRADE CHOLANGIOPANCREATOGRAPHY          Past Medical History:   Diagnosis Date    Chronic recurrent pancreatitis (H)     Diabetes (H)     Gastroesophageal reflux disease     Hyperparathyroidism (H24)     Nonischemic cardiomyopathy (H)     Obese     Other chronic pain     Sleep apnea       No past surgical history on file.   Allergies   Allergen Reactions    Iodine Hives, Itching and Rash     Pt allergic to iodine/ shellfish , contrast dyes.     Pt allergic to iodine/ shellfish , contrast dyes.    Trazodone Other (See Comments)     priapsim      Social History     Tobacco Use    Smoking status: Never    Smokeless tobacco: Never   Substance Use Topics    Alcohol use: Never      Wt Readings from Last 1 Encounters:   24 121.6 kg (268 lb)        Anesthesia Evaluation   Pt has had prior anesthetic. Type: General.    No history of anesthetic complications       ROS/MED HX  ENT/Pulmonary:     (+) sleep apnea, doesn't use CPAP,                                      Neurologic:       Cardiovascular: Comment: Non-ischemic cardiomyopathy  ECHO on 2023 demonstrated LVEF= 60%, RV is normal in size and function, Valves are normal    (+)  hypertension- -   -  - -                                      METS/Exercise Tolerance:     Hematologic:       Musculoskeletal:       GI/Hepatic: Comment: Chronic pancreatitis    (+) GERD, Asymptomatic on medication,                  Renal/Genitourinary:       Endo:     (+)  type II DM,   Not using insulin, - not using insulin pump.  not previously admitted for DM/DKA.       Obesity,       Psychiatric/Substance Use:     (+) psychiatric history anxiety   Recreational drug usage: Cannabis.    Infectious Disease:  - neg infectious disease ROS     Malignancy:  - neg  "malignancy ROS     Other:  - neg other ROS    (+)  , H/O Chronic Pain,         Physical Exam    Airway        Mallampati: II   TM distance: > 3 FB   Neck ROM: full   Mouth opening: > 3 cm    Respiratory Devices and Support         Dental       (+) Modest Abnormalities - crowns, retainers, 1 or 2 missing teeth      Cardiovascular   cardiovascular exam normal       Rhythm and rate: regular and normal     Pulmonary   pulmonary exam normal        breath sounds clear to auscultation         OUTSIDE LABS:  CBC: No results found for: \"WBC\", \"HGB\", \"HCT\", \"PLT\"  BMP: No results found for: \"NA\", \"POTASSIUM\", \"CHLORIDE\", \"CO2\", \"BUN\", \"CR\", \"GLC\"  COAGS: No results found for: \"PTT\", \"INR\", \"FIBR\"  POC: No results found for: \"BGM\", \"HCG\", \"HCGS\"  HEPATIC: No results found for: \"ALBUMIN\", \"PROTTOTAL\", \"ALT\", \"AST\", \"GGT\", \"ALKPHOS\", \"BILITOTAL\", \"BILIDIRECT\", \"LOGAN\"  OTHER: No results found for: \"PH\", \"LACT\", \"A1C\", \"PHAM\", \"PHOS\", \"MAG\", \"LIPASE\", \"AMYLASE\", \"TSH\", \"T4\", \"T3\", \"CRP\", \"SED\"    Anesthesia Plan    ASA Status:  2    NPO Status:  NPO Appropriate    Anesthesia Type: General.     - Airway: ETT   Induction: Intravenous, Propofol.   Maintenance: Balanced.        Consents    Anesthesia Plan(s) and associated risks, benefits, and realistic alternatives discussed. Questions answered and patient/representative(s) expressed understanding.     - Discussed: Risks, Benefits and Alternatives for BOTH SEDATION and the PROCEDURE were discussed     - Discussed with:  Patient      - Extended Intubation/Ventilatory Support Discussed: No.      - Patient is DNR/DNI Status: No     Use of blood products discussed: No .     Postoperative Care    Pain management: IV analgesics.   PONV prophylaxis: Ondansetron (or other 5HT-3), Dexamethasone or Solumedrol     Comments:               Marvin Johnson, DO    I have reviewed the pertinent notes and labs in the chart from the past 30 days and (re)examined the patient.  Any updates or changes " from those notes are reflected in this note.

## 2024-07-18 NOTE — ANESTHESIA PROCEDURE NOTES
Airway       Patient location during procedure: OR       Procedure Start/Stop Times: 7/18/2024 9:44 AM  Staff -        Anesthesiologist:  Jose Rios MD       CRNA: Tanya Baeza APRN CRNA       Performed By: CRNA  Consent for Airway        Urgency: elective  Indications and Patient Condition       Indications for airway management: alma-procedural       Induction type:intravenous       Mask difficulty assessment: 2 - vent by mask + OA or adjuvant +/- NMBA    Final Airway Details       Final airway type: endotracheal airway       Successful airway: ETT - single  Endotracheal Airway Details        ETT size (mm): 7.5       Cuffed: yes       Cuff volume (mL): 7       Successful intubation technique: video laryngoscopy       VL Blade Size: MAC 4       Grade View of Cords: 1       Adjucts: stylet       Position: Right       Measured from: gums/teeth       Secured at (cm): 23       Bite block used: None    Post intubation assessment        Placement verified by: capnometry, equal breath sounds and chest rise        Number of attempts at approach: 1       Secured with: tape       Ease of procedure: easy       Dentition: Intact    Medication(s) Administered   Medication Administration Time: 7/18/2024 9:44 AM

## 2024-07-18 NOTE — BRIEF OP NOTE
[facility]    Brief Operative Note    Pre-operative diagnosis: Chronic pancreatitis (H) [K86.1]  Post-operative diagnosis Same as pre-operative diagnosis    Procedure: ENDOSCOPIC ULTRASOUND, ESOPHAGOSCOPY / UPPER GASTROINTESTINAL TRACT (GI) with cystgastrostomy, N/A - Esophagus    Surgeon: Surgeons and Role:     * Murtaza Villafuerte MD - Primary     * Simon Lay MD - Assisting     * Morris Hoffman MD - Fellow - Assisting  Anesthesia: General   Estimated Blood Loss: None    Drains: None  Specimens: * No specimens in log *    Findings:     - 3 mm pancreatic pseudocyst with heterogenous contents suggestive of sludge/debris s/p DIANA stent (10 x 10 mm Axios) and plastic (10 F x 4 cm Avalos) stent placement into the pseudocyst with adequate drainage     Recommendations:  - Clear liquid diet  - Advanced diet as tolerated   - EGD with fluoroscopy for Axios removal and stent exchange in 1 week    Complications: None.  Implants:   Implant Name Type Inv. Item Serial No.  Lot No. LRB No. Used Action   STENT ESU AXIOS W/DEL SYS [id number] 10.8FR 138CM [id number] - [id number] Stent STENT ESU AXIOS W/DEL SYS [id number] 10.8FR 138CM [id number]  [employer] [id number] N/A 1 Implanted   STENT BILIARY [id number] DBL PIGTAIL - [id number] Stent STENT BILIARY [id number] DBL PIGTAIL  AVALOS [id number]- N/A 1 Implanted

## 2024-07-18 NOTE — DISCHARGE INSTRUCTIONS
After Anesthesia (Sleep Medicine)  What should I do after anesthesia?  You should rest and relax for the next 24 hours. Avoid risky or difficult (strenuous) activity. A responsible adult should stay with you overnight.  Don't drive or use any heavy equipment for 24 hours. Even if you feel normal, your reactions may be affected by the sleep medicine given to you.  Don't drink alcohol or make any important decisions for 24 hours.  Slowly get back to your regular diet, as you feel able.  How should I expect to feel?  It's normal to feel dizzy, light-headed, or faint for up to a full day after anesthesia or while taking pain medicine. If this happens:   Sit down for a few minutes before standing.  Have someone help you when you get up to walk or use the bathroom.  If you have nausea (feel sick to your stomach) or vomit (throw up):   Drink clear liquids (such as apple juice, ginger ale, broth, or 7UP) until you feel better.  If you feel sick to your stomach, or you keep vomiting for 24 hours, please call the doctor.  What else should I know?  You might have a dry mouth, sore throat, muscle aches, or trouble sleeping. These should go away after 24 hours.  Please contact your doctor if you have any other symptoms that concern you, such as fever, pain, bleeding, fluid drainage, swelling, or headache, or if it's been over 8 to 10 hours and you still aren't able to pee (urinate).  If you have a history of sleep apnea, it's very important to use your CPAP machine for the next 24 hours when you nap or sleep.   For informational purposes only. Not to replace the advice of your health care provider. Copyright   2023 [employer]. All rights reserved. Clinically reviewed by Christopher Luna MD. [employer] [id number] - REV 09/23.  Contacting your Doctor -   To contact a doctor, call Dr Villafuerte at the  GI clinic at [phone]      or:  [phone] and ask for the resident on call for Gastroenterology (answered 24 hours a  day)   Emergency Department:  [place]: [phone]  [facility]: [phone] 911 if you are in need of immediate or emergent help

## 2024-07-18 NOTE — OR NURSING
Patient at moderate risk for suicide. Reports he sees a therapist regularly and will not harm himself. Does not feel he needs any more resources.

## 2024-07-18 NOTE — ANESTHESIA CARE TRANSFER NOTE
Patient: Abiel Weeks    Procedure: Procedure(s):  ENDOSCOPIC ULTRASOUND, ESOPHAGOSCOPY / UPPER GASTROINTESTINAL TRACT (GI) with cystgastrostomy       Diagnosis: Chronic pancreatitis (H) [K86.1]  Diagnosis Additional Information: No value filed.    Anesthesia Type:   General     Note:    Oropharynx: oropharynx clear of all foreign objects  Level of Consciousness: drowsy  Oxygen Supplementation: room air    Independent Airway: airway patency satisfactory and stable  Dentition: dentition unchanged  Vital Signs Stable: post-procedure vital signs reviewed and stable  Report to RN Given: handoff report given  Patient transferred to: PACU    Handoff Report: Identifed the Patient, Identified the Reponsible Provider, Reviewed the pertinent medical history, Discussed the surgical course, Reviewed Intra-OP anesthesia mangement and issues during anesthesia, Set expectations for post-procedure period and Allowed opportunity for questions and acknowledgement of understanding      Vitals:  Vitals Value Taken Time   /78 07/18/24 1122   Temp 37.4  C (99.3  F) 07/18/24 1122   Pulse 76 07/18/24 1126   Resp 17 07/18/24 1126   SpO2 96 % 07/18/24 1126   Vitals shown include unfiled device data.    Electronically Signed By: KRISTIN Guy CRNA  July 18, 2024  11:26 AM

## 2024-07-22 ENCOUNTER — PATIENT OUTREACH (OUTPATIENT)
Dept: GASTROENTEROLOGY | Facility: CLINIC | Age: 39
End: 2024-07-22
Payer: COMMERCIAL

## 2024-07-22 ENCOUNTER — PREP FOR PROCEDURE (OUTPATIENT)
Dept: GASTROENTEROLOGY | Facility: CLINIC | Age: 39
End: 2024-07-22
Payer: COMMERCIAL

## 2024-07-22 ENCOUNTER — TELEPHONE (OUTPATIENT)
Dept: GASTROENTEROLOGY | Facility: CLINIC | Age: 39
End: 2024-07-22
Payer: COMMERCIAL

## 2024-07-22 DIAGNOSIS — K86.3 PANCREATIC PSEUDOCYST: Primary | ICD-10-CM

## 2024-07-22 NOTE — TELEPHONE ENCOUNTER
[facility] Call Center    Phone Message    May a detailed message be left on voicemail: yes     Reason for Call: Other: Received call from Pt's pharmacy in regards to an Oxycodone prescription for the Pt that is missing information that was sent in following the Pt's recent procedure with Dr. Villafuerte. Please call back as soon as possible to discuss.     Action Taken: Message routed to:  [facility] Benjy-Luis Ai    Travel Screening: Not Applicable     Date of Service:

## 2024-07-22 NOTE — TELEPHONE ENCOUNTER
Called pt to discuss follow up to  procedure, pt states he is doing ok at home. Does have oxycodone in case needed it. Told to advance diet as needed.     EGD with fluoroscopy in the OR for Axios stent removal with Dr. Villafuerte in a week.     Diagnosis: Pancreatic pseudocyst     Procedure/Imaging/Clinic: EGD with flouroscopy with stent exchange  Physician: Noy  Timin24  Scope time needed: 60 min  Anesthesia:General  Dx: pancreatic pseudocyst  Tier:Tier 2 - Not life/limb threatening but potential for  patient morbidity/mortality or adverse  patient/disease outcome if  surgery/procedure not done within 30 day  Location: [facility]  Header of letter for pt communication: EGD with stent exchange     Pt in agreement with  procedure date    Explained OR will call 1-2 days prior to procedure date with arrival time, will need a , someone to stay with them for 24 hours and should stay in town for 24 hours (within 45 min of Hospital) post procedure    Patient needs to get pre-op physical completed. If outside  health system will need physical faxed to number [phone]     If you do not get a preop physical, your procedure could be cancelled, patient voiced understanding*    Preop Plan: H & P 24    Does patient have [employer] insurance?:Comp[employer]    Med Review    Blood thinner -  none  ASA - none  Diabetic - metformin, discussed holding this for the day/morning of procedure  Any meds by injection or mouth for weight loss or diabetes- none    Patient Education r/t procedure: mychart    A pre-op nurse will call 1-2 days prior to the procedure.    NPO/Prep:   Adults and Children of all ages may consume solids up to 8 hours prior to arrival time - may consume clear liquids up to 1 hour prior to arrival time.    Verbalized understanding of all instructions. All questions answered.     Procedure order placed, message routed to OR     Sommer Del Angel, RN, BSN,   [facility]  Care  coordinator

## 2024-07-22 NOTE — TELEPHONE ENCOUNTER
Spoke with pharmacist who needed the FRANK number for prescribing MD. Provided Dr Villafuerte's FRANK, but do not have Dr Hoffman's FRANK.

## 2024-07-24 ENCOUNTER — ANESTHESIA EVENT (OUTPATIENT)
Dept: SURGERY | Facility: CLINIC | Age: 39
End: 2024-07-24
Payer: COMMERCIAL

## 2024-07-25 ENCOUNTER — APPOINTMENT (OUTPATIENT)
Dept: GENERAL RADIOLOGY | Facility: CLINIC | Age: 39
End: 2024-07-25
Attending: INTERNAL MEDICINE
Payer: COMMERCIAL

## 2024-07-25 ENCOUNTER — ANESTHESIA (OUTPATIENT)
Dept: SURGERY | Facility: CLINIC | Age: 39
End: 2024-07-25
Payer: COMMERCIAL

## 2024-07-25 ENCOUNTER — HOSPITAL ENCOUNTER (OUTPATIENT)
Facility: CLINIC | Age: 39
Discharge: HOME | End: 2024-07-25
Attending: INTERNAL MEDICINE | Admitting: INTERNAL MEDICINE
Payer: COMMERCIAL

## 2024-07-25 VITALS
WEIGHT: 262.79 LBS | DIASTOLIC BLOOD PRESSURE: 88 MMHG | TEMPERATURE: 98.2 F | RESPIRATION RATE: 14 BRPM | HEIGHT: 72 IN | HEART RATE: 70 BPM | BODY MASS INDEX: 35.59 KG/M2 | OXYGEN SATURATION: 98 % | SYSTOLIC BLOOD PRESSURE: 140 MMHG

## 2024-07-25 LAB — GLUCOSE BLDC GLUCOMTR-MCNC: 114 MG/DL (ref 70–99)

## 2024-07-25 PROCEDURE — 250N000025 HC SEVOFLURANE, PER MIN: Performed by: INTERNAL MEDICINE

## 2024-07-25 PROCEDURE — 710N000010 HC RECOVERY PHASE 1, LEVEL 2, PER MIN: Performed by: INTERNAL MEDICINE

## 2024-07-25 PROCEDURE — 258N000003 HC RX IP 258 OP 636

## 2024-07-25 PROCEDURE — 43247 EGD REMOVE FOREIGN BODY: CPT | Performed by: ANESTHESIOLOGY

## 2024-07-25 PROCEDURE — 250N000013 HC RX MED GY IP 250 OP 250 PS 637: Performed by: ANESTHESIOLOGY

## 2024-07-25 PROCEDURE — 710N000012 HC RECOVERY PHASE 2, PER MINUTE: Performed by: INTERNAL MEDICINE

## 2024-07-25 PROCEDURE — 250N000011 HC RX IP 250 OP 636: Performed by: STUDENT IN AN ORGANIZED HEALTH CARE EDUCATION/TRAINING PROGRAM

## 2024-07-25 PROCEDURE — 272N000001 HC OR GENERAL SUPPLY STERILE: Performed by: INTERNAL MEDICINE

## 2024-07-25 PROCEDURE — 250N000009 HC RX 250: Performed by: NURSE ANESTHETIST, CERTIFIED REGISTERED

## 2024-07-25 PROCEDURE — 250N000009 HC RX 250: Performed by: STUDENT IN AN ORGANIZED HEALTH CARE EDUCATION/TRAINING PROGRAM

## 2024-07-25 PROCEDURE — 82962 GLUCOSE BLOOD TEST: CPT

## 2024-07-25 PROCEDURE — 250N000013 HC RX MED GY IP 250 OP 250 PS 637: Performed by: NURSE ANESTHETIST, CERTIFIED REGISTERED

## 2024-07-25 PROCEDURE — 258N000003 HC RX IP 258 OP 636: Performed by: NURSE ANESTHETIST, CERTIFIED REGISTERED

## 2024-07-25 PROCEDURE — 43247 EGD REMOVE FOREIGN BODY: CPT | Performed by: NURSE ANESTHETIST, CERTIFIED REGISTERED

## 2024-07-25 PROCEDURE — 250N000009 HC RX 250: Performed by: INTERNAL MEDICINE

## 2024-07-25 PROCEDURE — 999N000141 HC STATISTIC PRE-PROCEDURE NURSING ASSESSMENT: Performed by: INTERNAL MEDICINE

## 2024-07-25 PROCEDURE — 370N000017 HC ANESTHESIA TECHNICAL FEE, PER MIN: Performed by: INTERNAL MEDICINE

## 2024-07-25 PROCEDURE — 999N000179 XR SURGERY CARM FLUORO LESS THAN 5 MIN W STILLS: Mod: TC

## 2024-07-25 PROCEDURE — 360N000082 HC SURGERY LEVEL 2 W/ FLUORO, PER MIN: Performed by: INTERNAL MEDICINE

## 2024-07-25 PROCEDURE — 250N000011 HC RX IP 250 OP 636: Performed by: NURSE ANESTHETIST, CERTIFIED REGISTERED

## 2024-07-25 RX ORDER — DEXAMETHASONE SODIUM PHOSPHATE 4 MG/ML
4 INJECTION, SOLUTION INTRA-ARTICULAR; INTRALESIONAL; INTRAMUSCULAR; INTRAVENOUS; SOFT TISSUE
Status: DISCONTINUED | OUTPATIENT
Start: 2024-07-25 | End: 2024-07-25 | Stop reason: HOSPADM

## 2024-07-25 RX ORDER — LIDOCAINE 40 MG/G
CREAM TOPICAL
Status: DISCONTINUED | OUTPATIENT
Start: 2024-07-25 | End: 2024-07-25 | Stop reason: HOSPADM

## 2024-07-25 RX ORDER — OXYCODONE HYDROCHLORIDE 10 MG/1
10 TABLET ORAL
Status: CANCELLED | OUTPATIENT
Start: 2024-07-25

## 2024-07-25 RX ORDER — NALOXONE HYDROCHLORIDE 0.4 MG/ML
0.1 INJECTION, SOLUTION INTRAMUSCULAR; INTRAVENOUS; SUBCUTANEOUS
Status: CANCELLED | OUTPATIENT
Start: 2024-07-25

## 2024-07-25 RX ORDER — ONDANSETRON 4 MG/1
4 TABLET, ORALLY DISINTEGRATING ORAL EVERY 30 MIN PRN
Status: DISCONTINUED | OUTPATIENT
Start: 2024-07-25 | End: 2024-07-25 | Stop reason: HOSPADM

## 2024-07-25 RX ORDER — NALOXONE HYDROCHLORIDE 0.4 MG/ML
0.4 INJECTION, SOLUTION INTRAMUSCULAR; INTRAVENOUS; SUBCUTANEOUS
Status: DISCONTINUED | OUTPATIENT
Start: 2024-07-25 | End: 2024-07-25 | Stop reason: HOSPADM

## 2024-07-25 RX ORDER — ONDANSETRON 4 MG/1
4 TABLET, ORALLY DISINTEGRATING ORAL EVERY 30 MIN PRN
Status: CANCELLED | OUTPATIENT
Start: 2024-07-25

## 2024-07-25 RX ORDER — FLUMAZENIL 0.1 MG/ML
0.2 INJECTION, SOLUTION INTRAVENOUS
Status: DISCONTINUED | OUTPATIENT
Start: 2024-07-25 | End: 2024-07-25 | Stop reason: HOSPADM

## 2024-07-25 RX ORDER — HALOPERIDOL 5 MG/ML
1 INJECTION INTRAMUSCULAR
Status: CANCELLED | OUTPATIENT
Start: 2024-07-25

## 2024-07-25 RX ORDER — ONDANSETRON 2 MG/ML
4 INJECTION INTRAMUSCULAR; INTRAVENOUS EVERY 30 MIN PRN
Status: CANCELLED | OUTPATIENT
Start: 2024-07-25

## 2024-07-25 RX ORDER — ONDANSETRON 2 MG/ML
4 INJECTION INTRAMUSCULAR; INTRAVENOUS
Status: DISCONTINUED | OUTPATIENT
Start: 2024-07-25 | End: 2024-07-25 | Stop reason: HOSPADM

## 2024-07-25 RX ORDER — FENTANYL CITRATE 50 UG/ML
50 INJECTION, SOLUTION INTRAMUSCULAR; INTRAVENOUS EVERY 5 MIN PRN
Status: DISCONTINUED | OUTPATIENT
Start: 2024-07-25 | End: 2024-07-25 | Stop reason: HOSPADM

## 2024-07-25 RX ORDER — ONDANSETRON 2 MG/ML
4 INJECTION INTRAMUSCULAR; INTRAVENOUS EVERY 30 MIN PRN
Status: DISCONTINUED | OUTPATIENT
Start: 2024-07-25 | End: 2024-07-25 | Stop reason: HOSPADM

## 2024-07-25 RX ORDER — OXYCODONE HYDROCHLORIDE 5 MG/1
5 TABLET ORAL
Status: CANCELLED | OUTPATIENT
Start: 2024-07-25

## 2024-07-25 RX ORDER — ONDANSETRON 2 MG/ML
INJECTION INTRAMUSCULAR; INTRAVENOUS PRN
Status: DISCONTINUED | OUTPATIENT
Start: 2024-07-25 | End: 2024-07-25

## 2024-07-25 RX ORDER — NALOXONE HYDROCHLORIDE 0.4 MG/ML
0.2 INJECTION, SOLUTION INTRAMUSCULAR; INTRAVENOUS; SUBCUTANEOUS
Status: DISCONTINUED | OUTPATIENT
Start: 2024-07-25 | End: 2024-07-25 | Stop reason: HOSPADM

## 2024-07-25 RX ORDER — EPHEDRINE SULFATE 50 MG/ML
INJECTION, SOLUTION INTRAMUSCULAR; INTRAVENOUS; SUBCUTANEOUS PRN
Status: DISCONTINUED | OUTPATIENT
Start: 2024-07-25 | End: 2024-07-25

## 2024-07-25 RX ORDER — SODIUM CHLORIDE, SODIUM LACTATE, POTASSIUM CHLORIDE, CALCIUM CHLORIDE 600; 310; 30; 20 MG/100ML; MG/100ML; MG/100ML; MG/100ML
INJECTION, SOLUTION INTRAVENOUS CONTINUOUS
Status: DISCONTINUED | OUTPATIENT
Start: 2024-07-25 | End: 2024-07-25 | Stop reason: HOSPADM

## 2024-07-25 RX ORDER — HYDROMORPHONE HCL IN WATER/PF 6 MG/30 ML
0.4 PATIENT CONTROLLED ANALGESIA SYRINGE INTRAVENOUS EVERY 5 MIN PRN
Status: DISCONTINUED | OUTPATIENT
Start: 2024-07-25 | End: 2024-07-25 | Stop reason: HOSPADM

## 2024-07-25 RX ORDER — FENTANYL CITRATE 50 UG/ML
INJECTION, SOLUTION INTRAMUSCULAR; INTRAVENOUS PRN
Status: DISCONTINUED | OUTPATIENT
Start: 2024-07-25 | End: 2024-07-25

## 2024-07-25 RX ORDER — HYDROMORPHONE HCL IN WATER/PF 6 MG/30 ML
0.2 PATIENT CONTROLLED ANALGESIA SYRINGE INTRAVENOUS EVERY 5 MIN PRN
Status: DISCONTINUED | OUTPATIENT
Start: 2024-07-25 | End: 2024-07-25 | Stop reason: HOSPADM

## 2024-07-25 RX ORDER — ONDANSETRON 2 MG/ML
4 INJECTION INTRAMUSCULAR; INTRAVENOUS EVERY 6 HOURS PRN
Status: DISCONTINUED | OUTPATIENT
Start: 2024-07-25 | End: 2024-07-25 | Stop reason: HOSPADM

## 2024-07-25 RX ORDER — FENTANYL CITRATE 50 UG/ML
25 INJECTION, SOLUTION INTRAMUSCULAR; INTRAVENOUS EVERY 5 MIN PRN
Status: DISCONTINUED | OUTPATIENT
Start: 2024-07-25 | End: 2024-07-25 | Stop reason: HOSPADM

## 2024-07-25 RX ORDER — NALOXONE HYDROCHLORIDE 0.4 MG/ML
0.1 INJECTION, SOLUTION INTRAMUSCULAR; INTRAVENOUS; SUBCUTANEOUS
Status: DISCONTINUED | OUTPATIENT
Start: 2024-07-25 | End: 2024-07-25 | Stop reason: HOSPADM

## 2024-07-25 RX ORDER — PROCHLORPERAZINE MALEATE 5 MG
10 TABLET ORAL EVERY 6 HOURS PRN
Status: DISCONTINUED | OUTPATIENT
Start: 2024-07-25 | End: 2024-07-25 | Stop reason: HOSPADM

## 2024-07-25 RX ORDER — ONDANSETRON 4 MG/1
4 TABLET, ORALLY DISINTEGRATING ORAL EVERY 6 HOURS PRN
Status: DISCONTINUED | OUTPATIENT
Start: 2024-07-25 | End: 2024-07-25 | Stop reason: HOSPADM

## 2024-07-25 RX ORDER — LIDOCAINE HYDROCHLORIDE 20 MG/ML
INJECTION, SOLUTION INFILTRATION; PERINEURAL PRN
Status: DISCONTINUED | OUTPATIENT
Start: 2024-07-25 | End: 2024-07-25

## 2024-07-25 RX ORDER — ALBUTEROL SULFATE 90 UG/1
AEROSOL, METERED RESPIRATORY (INHALATION) PRN
Status: DISCONTINUED | OUTPATIENT
Start: 2024-07-25 | End: 2024-07-25

## 2024-07-25 RX ORDER — PROPOFOL 10 MG/ML
INJECTION, EMULSION INTRAVENOUS PRN
Status: DISCONTINUED | OUTPATIENT
Start: 2024-07-25 | End: 2024-07-25

## 2024-07-25 RX ORDER — DEXAMETHASONE SODIUM PHOSPHATE 4 MG/ML
INJECTION, SOLUTION INTRA-ARTICULAR; INTRALESIONAL; INTRAMUSCULAR; INTRAVENOUS; SOFT TISSUE PRN
Status: DISCONTINUED | OUTPATIENT
Start: 2024-07-25 | End: 2024-07-25

## 2024-07-25 RX ADMIN — EPHEDRINE SULFATE 5 MG: 5 INJECTION INTRAVENOUS at 16:10

## 2024-07-25 RX ADMIN — ALBUTEROL SULFATE 6 PUFF: 108 INHALANT RESPIRATORY (INHALATION) at 15:36

## 2024-07-25 RX ADMIN — Medication 50 MG: at 15:30

## 2024-07-25 RX ADMIN — PHENYLEPHRINE HYDROCHLORIDE 100 MCG: 10 INJECTION INTRAVENOUS at 16:12

## 2024-07-25 RX ADMIN — LIDOCAINE HYDROCHLORIDE 100 MG: 20 INJECTION, SOLUTION INFILTRATION; PERINEURAL at 15:30

## 2024-07-25 RX ADMIN — Medication 1 LOZENGE: at 17:26

## 2024-07-25 RX ADMIN — SODIUM CHLORIDE, POTASSIUM CHLORIDE, SODIUM LACTATE AND CALCIUM CHLORIDE 100 ML/HR: 600; 310; 30; 20 INJECTION, SOLUTION INTRAVENOUS at 13:26

## 2024-07-25 RX ADMIN — Medication 200 MG: at 16:24

## 2024-07-25 RX ADMIN — EPHEDRINE SULFATE 5 MG: 5 INJECTION INTRAVENOUS at 16:09

## 2024-07-25 RX ADMIN — DEXAMETHASONE SODIUM PHOSPHATE 8 MG: 4 INJECTION, SOLUTION INTRA-ARTICULAR; INTRALESIONAL; INTRAMUSCULAR; INTRAVENOUS; SOFT TISSUE at 15:30

## 2024-07-25 RX ADMIN — MIDAZOLAM 2 MG: 1 INJECTION INTRAMUSCULAR; INTRAVENOUS at 15:23

## 2024-07-25 RX ADMIN — ONDANSETRON 4 MG: 2 INJECTION INTRAMUSCULAR; INTRAVENOUS at 16:22

## 2024-07-25 RX ADMIN — SODIUM CHLORIDE, POTASSIUM CHLORIDE, SODIUM LACTATE AND CALCIUM CHLORIDE: 600; 310; 30; 20 INJECTION, SOLUTION INTRAVENOUS at 15:23

## 2024-07-25 RX ADMIN — FENTANYL CITRATE 100 MCG: 50 INJECTION INTRAMUSCULAR; INTRAVENOUS at 15:30

## 2024-07-25 RX ADMIN — PHENYLEPHRINE HYDROCHLORIDE 100 MCG: 10 INJECTION INTRAVENOUS at 16:00

## 2024-07-25 RX ADMIN — PROPOFOL 200 MG: 10 INJECTION, EMULSION INTRAVENOUS at 15:29

## 2024-07-25 ASSESSMENT — ACTIVITIES OF DAILY LIVING (ADL)
ADLS_ACUITY_SCORE: 31
ADLS_ACUITY_SCORE: 29
ADLS_ACUITY_SCORE: 31

## 2024-07-25 NOTE — BRIEF OP NOTE
[facility]    Brief Operative Note    Pre-operative diagnosis: Pancreatic pseudocyst [K86.3]  Post-operative diagnosis Same as pre-operative diagnosis    Procedure: ESOPHAGOGASTRODUODENOSCOPY with flouroscopy with Axios stent removal, N/A - Esophagus    Surgeon: Surgeons and Role:     * Murtaza Villafuerte MD - Primary     * Morris Hoffman MD - Fellow - Assisting  Anesthesia: General   Estimated Blood Loss: None    Drains: None  Specimens: * No specimens in log *    Findings:     - EGD with fluoroscopy and removal of LAMS while leaving plastic stent in place     Recommendations:  - Follow with Dr. Lay in GI/pancreatic clinic     Complications: None.  Implants:   Implant Name Type Inv. Item Serial No.  Lot No. LRB No. Used Action   STENT ESU AXIOS W/DEL SYS [id number] 10.8FR 138CM [id number] - [id number] Stent STENT ESU AXIOS W/DEL SYS [id number] 10.8FR 138CM [id number]  [employer] [id number] N/A 1 Explanted

## 2024-07-25 NOTE — ANESTHESIA PREPROCEDURE EVALUATION
Anesthesia Pre-Procedure Evaluation    Patient: Abiel Weeks   MRN: [id number] : 1985        Procedure : Procedure(s):  ESOPHAGOGASTRODUODENOSCOPY with flouroscopy with Axios stent exchange          Past Medical History:   Diagnosis Date    Chronic recurrent pancreatitis (H)     Diabetes (H)     Gastroesophageal reflux disease     Hyperparathyroidism (H24)     Nonischemic cardiomyopathy (H)     Obese     Other chronic pain     Sleep apnea       Past Surgical History:   Procedure Laterality Date    ENDOSCOPIC ULTRASOUND UPPER GASTROINTESTINAL TRACT (GI) N/A 2024    Procedure: ENDOSCOPIC ULTRASOUND, ESOPHAGOSCOPY / UPPER GASTROINTESTINAL TRACT (GI) with cystgastrostomy;  Surgeon: Murtaza Villafuerte MD;  Location: [facility] OR      Allergies   Allergen Reactions    Iodine Hives, Itching and Rash     Pt allergic to iodine/ shellfish , contrast dyes.     Pt allergic to iodine/ shellfish , contrast dyes.    Trazodone Other (See Comments)     priapsim    Amoxicillin      Patient does not what type of reaction/happened as a child      Social History     Tobacco Use    Smoking status: Never    Smokeless tobacco: Never   Substance Use Topics    Alcohol use: Not Currently      Wt Readings from Last 1 Encounters:   24 118 kg (260 lb 2.3 oz)        Anesthesia Evaluation   Pt has had prior anesthetic. Type: General.    No history of anesthetic complications       ROS/MED HX  ENT/Pulmonary:     (+) sleep apnea, doesn't use CPAP,                                      Neurologic:       Cardiovascular: Comment: Non-ischemic cardiomyopathy  ECHO on 2023 demonstrated LVEF= 60%, RV is normal in size and function, Valves are normal    (+)  hypertension- -   -  - -                                      METS/Exercise Tolerance:     Hematologic:       Musculoskeletal:       GI/Hepatic: Comment: Chronic pancreatitis    (+) GERD, Asymptomatic on medication,                  Renal/Genitourinary: Comment: CKD      Endo:  "Comment: Hyperparathyroidism    (+)  type II DM,   Not using insulin, - not using insulin pump.  not previously admitted for DM/DKA.       Obesity,    (-) Type I DM   Psychiatric/Substance Use:     (+) psychiatric history anxiety and depression   Recreational drug usage: Cannabis.    Infectious Disease:  - neg infectious disease ROS     Malignancy:  - neg malignancy ROS     Other:  - neg other ROS    (+)  , H/O Chronic Pain,         Physical Exam    Airway        Mallampati: I   TM distance: > 3 FB   Neck ROM: full   Mouth opening: > 3 cm    Respiratory Devices and Support         Dental       (+) Minor Abnormalities - some fillings, tiny chips    B=Bridge, C=Chipped, L=Loose, M=Missing    Cardiovascular   cardiovascular exam normal          Pulmonary   pulmonary exam normal                OUTSIDE LABS:  CBC:   Lab Results   Component Value Date    WBC 13.6 (H) 07/18/2024    HGB 13.6 07/18/2024    HCT 40.1 07/18/2024     07/18/2024     BMP:   Lab Results   Component Value Date     07/18/2024    POTASSIUM 3.9 07/18/2024    CHLORIDE 103 07/18/2024    CO2 22 07/18/2024    BUN 18.6 07/18/2024    CR 1.45 (H) 07/18/2024     (H) 07/18/2024     (H) 07/18/2024     COAGS: No results found for: \"PTT\", \"INR\", \"FIBR\"  POC: No results found for: \"BGM\", \"HCG\", \"HCGS\"  HEPATIC:   Lab Results   Component Value Date    ALBUMIN 4.3 07/18/2024    PROTTOTAL 7.0 07/18/2024    ALT <5 07/18/2024    AST 18 07/18/2024    ALKPHOS 68 07/18/2024    BILITOTAL 0.7 07/18/2024     OTHER:   Lab Results   Component Value Date    PHAM 9.0 07/18/2024    LIPASE 49 07/18/2024       Anesthesia Plan    ASA Status:  3    NPO Status:  NPO Appropriate    Anesthesia Type: General.     - Airway: ETT   Induction: Intravenous.   Maintenance: Balanced.        Consents    Anesthesia Plan(s) and associated risks, benefits, and realistic alternatives discussed. Questions answered and patient/representative(s) expressed understanding.     - " Discussed:     - Discussed with:  Patient      - Extended Intubation/Ventilatory Support Discussed: No.      - Patient is DNR/DNI Status: No     Use of blood products discussed: No .     Postoperative Care    Pain management: IV analgesics.   PONV prophylaxis: Ondansetron (or other 5HT-3), Dexamethasone or Solumedrol     Comments:               Juan Jose Mckenzie MD    I have reviewed the pertinent notes and labs in the chart from the past 30 days and (re)examined the patient.  Any updates or changes from those notes are reflected in this note.              # Obesity: Estimated body mass index is 35.28 kg/m  as calculated from the following:    Height as of 7/18/24: 1.829 m (6').    Weight as of 7/18/24: 118 kg (260 lb 2.3 oz).

## 2024-07-25 NOTE — ANESTHESIA PROCEDURE NOTES
Airway       Patient location during procedure: OR       Procedure Start/Stop Times: 7/25/2024 3:34 PM  Staff -        Anesthesiologist:  Tres Felder MD       CRNA: Tanya Baeza APRN CRNA       Performed By: CRNA  Consent for Airway        Urgency: elective  Indications and Patient Condition       Indications for airway management: alma-procedural       Induction type:intravenous       Mask difficulty assessment: 2 - vent by mask + OA or adjuvant +/- NMBA    Final Airway Details       Final airway type: endotracheal airway       Successful airway: ETT - single  Endotracheal Airway Details        ETT size (mm): 7.5       Cuffed: yes       Cuff volume (mL): 8       Successful intubation technique: direct laryngoscopy       DL Blade Type: MAC 3       Grade View of Cords: 1       Adjucts: stylet       Position: Right       Measured from: gums/teeth       Secured at (cm): 23       Bite block used: None    Post intubation assessment        Placement verified by: capnometry, equal breath sounds and chest rise        Number of attempts at approach: 1       Secured with: tape       Ease of procedure: easy       Dentition: Intact    Medication(s) Administered   Medication Administration Time: 7/25/2024 3:34 PM

## 2024-07-25 NOTE — ANESTHESIA POSTPROCEDURE EVALUATION
Patient: Abiel Weeks    Procedure: Procedure(s):  ESOPHAGOGASTRODUODENOSCOPY with flouroscopy with Axios stent removal       Anesthesia Type:  General    Note:  Disposition: Outpatient   Postop Pain Control: Uneventful            Sign Out: Well controlled pain   PONV:    Neuro/Psych: Uneventful            Sign Out: Acceptable/Baseline neuro status   Airway/Respiratory: Uneventful            Sign Out: Acceptable/Baseline resp. status   CV/Hemodynamics: Uneventful            Sign Out: Acceptable CV status; No obvious hypovolemia; No obvious fluid overload   Other NRE: NONE   DID A NON-ROUTINE EVENT OCCUR? No           Last vitals:  Vitals Value Taken Time   /92 07/25/24 1645   Temp     Pulse 74 07/25/24 1654   Resp 15 07/25/24 1654   SpO2 97 % 07/25/24 1654   Vitals shown include unfiled device data.    Electronically Signed By: Tres Felder MD  July 25, 2024  4:55 PM

## 2024-07-25 NOTE — DISCHARGE INSTRUCTIONS
Contacting your Doctor -   To contact a doctor, call Dr Villafuerte [phone] and ask for the resident on call for Gastroenterology (answered 24 hours a day)   Emergency Department:  [facility]: [phone]  [facility]: [phone] if you are in need of immediate or emergent help

## 2024-07-25 NOTE — ANESTHESIA CARE TRANSFER NOTE
Patient: Abiel Weeks    Procedure: Procedure(s):  ESOPHAGOGASTRODUODENOSCOPY with flouroscopy with Axios stent removal       Diagnosis: Pancreatic pseudocyst [K86.3]  Diagnosis Additional Information: No value filed.    Anesthesia Type:   General     Note:    Oropharynx: oropharynx clear of all foreign objects  Level of Consciousness: drowsy  Oxygen Supplementation: face mask  Level of Supplemental Oxygen (L/min / FiO2): 6  Independent Airway: airway patency satisfactory and stable  Dentition: dentition unchanged  Vital Signs Stable: post-procedure vital signs reviewed and stable  Report to RN Given: handoff report given  Patient transferred to: PACU    Handoff Report: Identifed the Patient, Identified the Reponsible Provider, Reviewed the pertinent medical history, Discussed the surgical course, Reviewed Intra-OP anesthesia mangement and issues during anesthesia, Set expectations for post-procedure period and Allowed opportunity for questions and acknowledgement of understanding  Vitals:  Vitals Value Taken Time   /92 07/25/24 1645   Temp     Pulse 78 07/25/24 1646   Resp 19 07/25/24 1646   SpO2 97 % 07/25/24 1646   Vitals shown include unfiled device data.    Electronically Signed By: KRISTIN Guy CRNA  July 25, 2024  4:46 PM

## 2024-07-29 LAB
UPPER EUS: NORMAL
UPPER GI ENDOSCOPY: NORMAL

## 2024-07-30 ENCOUNTER — PATIENT OUTREACH (OUTPATIENT)
Dept: GASTROENTEROLOGY | Facility: CLINIC | Age: 39
End: 2024-07-30
Payer: COMMERCIAL

## 2024-07-30 NOTE — TELEPHONE ENCOUNTER
Pt called with the question of whether he needs to arrange a follow up visit after procedure last Thurs 7/25. Dr Villafuerte's procedure recs:    - Follow with Dr. Lay or Dr. Villafuerte in                          GI/pancreatic clinic prn if recurrent symptoms occur.                          The plan will be to keep the plastic stent in place                          indefinitely for management of disconnected duct                          syndrome.     Pt currently does have pain, but not as bad as when he first had symptoms. Is taking tylenol and has oxycodone available as needed. Did encourage him to stay hydrated and go slow on solid foods if noticing that pain is worse with food. Did advise he call our office again if pain is not subsiding or improving.    Sommer Del Angel, RN, BSN,   [facility]  Care coordinator

## 2024-08-03 ENCOUNTER — HEALTH MAINTENANCE LETTER (OUTPATIENT)
Age: 39
End: 2024-08-03

## 2024-08-21 ENCOUNTER — MYC MEDICAL ADVICE (OUTPATIENT)
Dept: GASTROENTEROLOGY | Facility: CLINIC | Age: 39
End: 2024-08-21
Payer: COMMERCIAL

## 2024-08-21 DIAGNOSIS — K86.3 PANCREATIC PSEUDOCYST: Primary | ICD-10-CM

## 2024-08-21 DIAGNOSIS — K86.1 CHRONIC PANCREATITIS (H): ICD-10-CM

## 2024-08-21 DIAGNOSIS — R10.84 ABDOMINAL PAIN, GENERALIZED: ICD-10-CM

## 2024-08-21 NOTE — TELEPHONE ENCOUNTER
"Whatever he eats he is uncomfortable and has difficult going to the bathroom. Pain is typically worse overnight when not taking the enzymes. He is taking the enzymes as prescribed during the day and with snacks. Says he is on pain meds during the day that help. But when he is having more pain he typically will just \"fast.\" Discussed that he should dial back to a liquid diet for a few days when he experiences more pain like this and slowly reintroduce low fat foods if pain subsides. Pt says he has been doing this and does adhere to a low fat diet.    Will send msg to Dr Lay's team to see if clinic visit to assess further is appropriate next step.     1348  Returned pt call with Dr Villafuerte's advise:    \"I should see him,.   I only left a single stent and this may be occluded.     He'll need labs (CBC, lipase) and a new CT (abdomen with IV contrast, no pelvis).   Then I potentially I could see him either this Friday or next Monday depending on timing of the CT.\"    Did discuss the potential of constipation causing the pain, since pt is taking pain meds and Creon. Pt does endorse constipation of which he takes miralax for. Suggested Senna or Milk of magnesium in addtiion and encouraged him to try these for a few days to see if pain is relieved.  Provided imaging scheduling number for [place], which is patient's preferred location for CT scan. Scheduled for Fri 8/23 at 9am. Will coordinate virtual visit with Dr Villafuerte once CT completed.      "

## 2024-08-22 DIAGNOSIS — Z91.041 CONTRAST MEDIA ALLERGY: Primary | ICD-10-CM

## 2024-08-22 RX ORDER — METHYLPREDNISOLONE 32 MG/1
32 TABLET ORAL 2 TIMES DAILY PRN
Qty: 2 TABLET | Refills: 0 | Status: SHIPPED | OUTPATIENT
Start: 2024-08-22

## 2024-08-22 NOTE — TELEPHONE ENCOUNTER
Spoke with pt to confirm virtual visit with Dr Villafuerte on 8/23 at 2pm. Pt did bring up his contrast allergy, methylpred 32mg X 2 tabs prescribed. Pt is diabetic and on oral medications. Did advise he monitor his blood sugars closely over the 2 days he is taking the steroid and update his PCP that he will be taking this. Dr Villafuerte also updated

## 2024-08-23 NOTE — TELEPHONE ENCOUNTER
Pt did not have CT scan today d/t methylpred not being available at his pharmacy. He cancelled his appt at 2pm given this. Did reschedule the CT for Weds 8/28 at 5pm. Per Dr Villafuerte, will reschedule virtual visit for Thurs 8/29. Pt in agreement , is available at 10:20am. Message routed to jessica, GI team and scheduling to make new virtual visit appt. Pt verbalized understanding that both labs and CT are needed, that if he can not complete these to call our clinic to update. Also discussed when to present to the [facility] while waiting for these appts.

## 2024-08-27 ENCOUNTER — PATIENT OUTREACH (OUTPATIENT)
Dept: GASTROENTEROLOGY | Facility: CLINIC | Age: 39
End: 2024-08-27
Payer: COMMERCIAL

## 2024-08-27 NOTE — PROGRESS NOTES
Attempted to reach patient to discuss Dr. Villafuerte's request for lab work prior to their clinic visit on 8/29/24. Provided patient with lab scheduling number as well as my direct phone number for any questions or concerns. [employer] previously sent with this information as well.     Paula Hernandez, RN Care Coordinator

## 2024-08-28 ENCOUNTER — ANCILLARY PROCEDURE (OUTPATIENT)
Dept: CT IMAGING | Facility: CLINIC | Age: 39
End: 2024-08-28
Attending: INTERNAL MEDICINE
Payer: COMMERCIAL

## 2024-08-28 ENCOUNTER — LAB (OUTPATIENT)
Dept: LAB | Facility: CLINIC | Age: 39
End: 2024-08-28
Payer: COMMERCIAL

## 2024-08-28 DIAGNOSIS — R10.84 ABDOMINAL PAIN, GENERALIZED: ICD-10-CM

## 2024-08-28 DIAGNOSIS — K86.1 CHRONIC PANCREATITIS (H): ICD-10-CM

## 2024-08-28 DIAGNOSIS — K86.3 PANCREATIC PSEUDOCYST: ICD-10-CM

## 2024-08-28 LAB
CREAT BLD-MCNC: 1.3 MG/DL (ref 0.7–1.3)
EGFRCR SERPLBLD CKD-EPI 2021: >60 ML/MIN/1.73M2
ERYTHROCYTE [DISTWIDTH] IN BLOOD BY AUTOMATED COUNT: 12.8 % (ref 10–15)
HCT VFR BLD AUTO: 40.6 % (ref 40–53)
HGB BLD-MCNC: 13.7 G/DL (ref 13.3–17.7)
LIPASE SERPL-CCNC: 13 U/L (ref 13–60)
MCH RBC QN AUTO: 30 PG (ref 26.5–33)
MCHC RBC AUTO-ENTMCNC: 33.7 G/DL (ref 31.5–36.5)
MCV RBC AUTO: 89 FL (ref 78–100)
PLATELET # BLD AUTO: 170 10E3/UL (ref 150–450)
RBC # BLD AUTO: 4.56 10E6/UL (ref 4.4–5.9)
WBC # BLD AUTO: 12 10E3/UL (ref 4–11)

## 2024-08-28 PROCEDURE — 82565 ASSAY OF CREATININE: CPT

## 2024-08-28 PROCEDURE — 83690 ASSAY OF LIPASE: CPT

## 2024-08-28 PROCEDURE — 85027 COMPLETE CBC AUTOMATED: CPT

## 2024-08-28 PROCEDURE — 74160 CT ABDOMEN W/CONTRAST: CPT | Mod: GC | Performed by: RADIOLOGY

## 2024-08-28 PROCEDURE — 36415 COLL VENOUS BLD VENIPUNCTURE: CPT

## 2024-08-28 RX ORDER — IOPAMIDOL 755 MG/ML
145 INJECTION, SOLUTION INTRAVASCULAR ONCE
Status: COMPLETED | OUTPATIENT
Start: 2024-08-28 | End: 2024-08-28

## 2024-08-28 RX ADMIN — IOPAMIDOL 145 ML: 755 INJECTION, SOLUTION INTRAVASCULAR at 17:23

## 2024-08-29 ENCOUNTER — VIRTUAL VISIT (OUTPATIENT)
Dept: GASTROENTEROLOGY | Facility: CLINIC | Age: 39
End: 2024-08-29
Attending: INTERNAL MEDICINE
Payer: COMMERCIAL

## 2024-08-29 VITALS — BODY MASS INDEX: 34.54 KG/M2 | HEIGHT: 72 IN | WEIGHT: 255 LBS

## 2024-08-29 DIAGNOSIS — R10.12 LEFT UPPER QUADRANT PAIN: ICD-10-CM

## 2024-08-29 DIAGNOSIS — K86.1 CHRONIC RECURRENT PANCREATITIS (H): ICD-10-CM

## 2024-08-29 DIAGNOSIS — K86.3 PANCREATIC PSEUDOCYST: Primary | ICD-10-CM

## 2024-08-29 DIAGNOSIS — K86.1 OTHER CHRONIC PANCREATITIS (H): ICD-10-CM

## 2024-08-29 PROCEDURE — 99214 OFFICE O/P EST MOD 30 MIN: CPT | Mod: 95 | Performed by: INTERNAL MEDICINE

## 2024-08-29 ASSESSMENT — PAIN SCALES - GENERAL: PAINLEVEL: MODERATE PAIN (4)

## 2024-08-29 NOTE — PROGRESS NOTES
Virtual Visit Details    Type of service:  Video Visit   Video Start Time: 10:22  Video End Time:10:31    Originating Location (pt. Location): Home    Distant Location (provider location):  On-site  Platform used for Video Visit: Sheri    [facility]  GASTROENTEROLOGY PROGRESS NOTE  Abiel Weeks [id number]     SUBJECTIVE:  Pt seen earlier than planned due to him calling our office with complaints of pain.    Prior to this visit CT was performed. This was personally reviewed and shows the cystgastrostomy stent to be in perfect position. There is no residual fluid in the drained cyst cavity and there are no surrounding inflammatory changes. There are no changes of acute pancreatitis and the upstream pancreatic duct is not dilated.    Labs showed slightly elevated WBC at 12, improved from 1 month ago. Otherwise normal CBC and lipase.    He states that he is having ongoing LUQ pain with radiation around the left lateral rib margin. This is the same pain he has been having chronically (since at least 2021 per him). This is slightly improved since the cystgastrostomy. He awakens with the pain and then has a BM, however the pain is not immediately relieved with the BM. He has formed stools every 1-3 days. He was taking miralax but stopped this and is now on docusate, with perhaps slight improvement in pain in the last week. This is not clearly affected by food, but he's on a restricted diet. He is taking Creon and protonix. Takes pregabalin through [facility] (has appt in the next few weeks) and tylenol for the pain.    Mild nausea. No emesis. No F/C/NS.    OBJECTIVE:  VS: Ht 1.829 m (6')   Wt 115.7 kg (255 lb)   BMI 34.58 kg/m     Anicteric, no acute distress.    IMPRESSION:  Abiel Weeks is a 39 year old male with chronic left-sided abdominal pain for several years. Slight improvement after repeat cystgastrostomy but not resolved. CT appears as expected post-procedure and no identifiable  additional target for intervention.    Per prior notes from Dr. Lay, there was some consideration of possible pancreatic ductal intervention if the pain persisted, however I am unclear if there is a target. Otherwise there was discussion re possible interventional pain referral.    RECOMMENDATIONS:  - Trial of metamucil (discussed).  - Continue Creon and pantoprazole.  - Continue pregabalin and follow-up with Sister Todd as scheduled.    Will message Dr. Lay re whether he will now offer ERCP and PD stenting or alternatively refer to interventional pain.    Follow-up with me prn.    It was a pleasure to participate in the care of this patient; please contact us with any further questions.  A total of 29 minutes was spent on the day of the visit, >50% of which was counseling regarding the above delineated issues. The remainder was review of records and imaging as well as documentation and coordination of care.    YAMILETH Villafuerte MD  Professor of Medicine  Division of Gastroenterology, Hepatology and Nutrition  [facility]  8/29/2024

## 2024-08-29 NOTE — NURSING NOTE
Current patient location: [address] [place] [address] [address]  [place] [address]    Is the patient currently in the state of [place]? YES    Visit mode:VIDEO    If the visit is dropped, the patient can be reconnected by: VIDEO VISIT: Text to cell phone:   Telephone Information:   Mobile [phone]       Will anyone else be joining the visit? NO  (If patient encounters technical issues they should call [phone])    How would you like to obtain your AVS? MyChart    Are changes needed to the allergy or medication list? Pt stated no changes to allergies and Pt stated no med changes    Are refills needed on medications prescribed by this physician? NO    Rooming Documentation:  Questionnaire(s) not done per department protocol Not applicable      Reason for visit: Consult    Celsa SANTILLANF

## 2024-08-29 NOTE — LETTER
8/29/2024      Abiel Weeks  [address]  [place] [address]      Dear Colleague,    Thank you for referring your patient, Abiel Weeks, to the [facility]. Please see a copy of my visit note below.    Virtual Visit Details    Type of service:  Video Visit   Video Start Time: 10:22  Video End Time:10:31    Originating Location (pt. Location): Home    Distant Location (provider location):  On-site  Platform used for Video Visit: [facility]  GASTROENTEROLOGY PROGRESS NOTE  Abiel Weeks [id number]     SUBJECTIVE:  Pt seen earlier than planned due to him calling our office with complaints of pain.    Prior to this visit CT was performed. This was personally reviewed and shows the cystgastrostomy stent to be in perfect position. There is no residual fluid in the drained cyst cavity and there are no surrounding inflammatory changes. There are no changes of acute pancreatitis and the upstream pancreatic duct is not dilated.    Labs showed slightly elevated WBC at 12, improved from 1 month ago. Otherwise normal CBC and lipase.    He states that he is having ongoing LUQ pain with radiation around the left lateral rib margin. This is the same pain he has been having chronically (since at least 2021 per him). This is slightly improved since the cystgastrostomy. He awakens with the pain and then has a BM, however the pain is not immediately relieved with the BM. He has formed stools every 1-3 days. He was taking miralax but stopped this and is now on docusate, with perhaps slight improvement in pain in the last week. This is not clearly affected by food, but he's on a restricted diet. He is taking Creon and protonix. Takes pregabalin through [facility] (has appt in the next few weeks) and tylenol for the pain.    Mild nausea. No emesis. No F/C/NS.    OBJECTIVE:  VS: Ht 1.829 m (6')   Wt 115.7 kg (255 lb)   BMI 34.58 kg/m     Anicteric, no acute  distress.    IMPRESSION:  Abiel Weeks is a 39 year old male with chronic left-sided abdominal pain for several years. Slight improvement after repeat cystgastrostomy but not resolved. CT appears as expected post-procedure and no identifiable additional target for intervention.    Per prior notes from Dr. Lay, there was some consideration of possible pancreatic ductal intervention if the pain persisted, however I am unclear if there is a target. Otherwise there was discussion re possible interventional pain referral.    RECOMMENDATIONS:  - Trial of metamucil (discussed).  - Continue Creon and pantoprazole.  - Continue pregabalin and follow-up with Sister Todd as scheduled.    Will message Dr. Lay re whether he will now offer ERCP and PD stenting or alternatively refer to interventional pain.    Follow-up with me prn.    It was a pleasure to participate in the care of this patient; please contact us with any further questions.  A total of 29 minutes was spent on the day of the visit, >50% of which was counseling regarding the above delineated issues. The remainder was review of records and imaging as well as documentation and coordination of care.    YAMILETH Villafuerte MD  Professor of Medicine  Division of Gastroenterology, Hepatology and Nutrition  [facility]  8/29/2024       Again, thank you for allowing me to participate in the care of your patient.        Sincerely,        Murtaza Villafuerte MD

## 2024-09-03 NOTE — PATIENT INSTRUCTIONS
It was a pleasure meeting with you today and discussing your healthcare plan. Below is a summary of what we covered:    RECOMMENDATIONS:  - Trial of metamucil (discussed).  - Continue Creon and pantoprazole.  - Continue pregabalin and follow-up with Sister Todd as scheduled.        Please see below for any additional questions and scheduling guidelines.    Sign up for [employer]: [employer] patient portal serves as a secure platform for accessing your medical records from the [facility]. Additionally, [employer] facilitates easy, timely, and secure messaging with your care team. If you have not signed up, you may do so by using the provided code or calling [phone].    Coordinating your care after your visit:  There are multiple options for scheduling your follow-up care based on your provider's recommendation.    How do I schedule a follow-up clinic appointment:   After your appointment, you may receive scheduling assistance with the Clinic Coordinators by having a seat in the waiting room and a Clinic Coordinator will call you up to schedule.  Virtual visits or after you leave the clinic:  Your provider has placed a follow-up order in the [employer] portal for scheduling your return appointment. A member of the scheduling team will contact you to schedule.  [facility] Scheduling: Timely scheduling through [employer] is advised to ensure appointment availability.   Call to schedule: You may schedule your follow-up appointment(s) by calling [phone], option 1.    How do I schedule my endoscopy or colonoscopy procedure:  If a procedure, such as a colonoscopy or upper endoscopy was ordered by your provider, the scheduling team will contact you to schedule this procedure. Or you may choose to call to schedule at   [phone], option 2.  Please allow 20-30 minutes when scheduling a procedure.    How do I get my blood work done? To get your blood work done, you need to schedule a lab appointment at an [facility]. There are multiple ways to schedule:   At the clinic: The Clinic Coordinator you meet after your visit can help you schedule a lab appointment.   [employer] scheduling: [employer] offers online lab scheduling at all [facility] laboratory locations.   Call to schedule: You can call [phone] to schedule your lab appointment.    How do I schedule my imaging study: To schedule imaging studies, such as CT scans, ultrasounds, MRIs, or X-rays, contact Imaging Services at [phone].    How do I schedule a referral to another doctor: If your provider recommended a referral to another specialist(s), the referral order was placed by your provider. You will receive a phone call to schedule this referral, or you may choose to call the number attached to the referral to self-schedule.    For Post-Visit Question(s):  For any inquiries following today's visit:  Please utilize [employer] messaging and allow 48 hours for reply or contact the Call Center during normal business hours at [phone], option 3.  For Emergent After-hours questions, contact the On-Call GI Fellow through the [facility]  at [phone].  In addition, you may contact your Nurse directly using the provided contact information.    Test Results:  Test results will be accessible via [employer] in compliance with the 21st Century Cures Act. This means that your results will be available to you at the same time as your provider. Often you may see your results before your provider does. Results are reviewed by staff within two weeks with communication follow-up. Results may be released in the patient portal prior to your care team review.    Prescription Refill(s):  Medication prescribed by your provider will be addressed during your visit. For future refills, please coordinate with your pharmacy. If you have not had a recent clinic visit or routine labs, for your safety, your provider may not be able to refill your  prescription.        Contacts post-consultation depending on your need:     Schedule Clinic Appointments                        [phone], option 1    Alison Del Angel, RN Care Coordinator           [phone]     Bela Lu OR                           [phone]     GI Procedure Scheduling                               [phone], option 2     For urgent/emergent questions after business hours, you may reach the on-call GI Fellow by contacting the [facility]  at [phone].      .

## 2024-09-04 ENCOUNTER — TELEPHONE (OUTPATIENT)
Dept: GASTROENTEROLOGY | Facility: CLINIC | Age: 39
End: 2024-09-04
Payer: COMMERCIAL

## 2024-10-02 ENCOUNTER — VIRTUAL VISIT (OUTPATIENT)
Dept: GASTROENTEROLOGY | Facility: CLINIC | Age: 39
End: 2024-10-02
Payer: COMMERCIAL

## 2024-10-02 DIAGNOSIS — K86.1 CHRONIC RECURRENT PANCREATITIS (H): Primary | ICD-10-CM

## 2024-10-02 PROCEDURE — 99214 OFFICE O/P EST MOD 30 MIN: CPT | Mod: 95 | Performed by: INTERNAL MEDICINE

## 2024-10-02 NOTE — NURSING NOTE
Current patient location: [address] [place] [address] [address]  [place] [address]    Is the patient currently in the state of [place]? YES    Visit mode:VIDEO    If the visit is dropped, the patient can be reconnected by: VIDEO VISIT: Text to cell phone:   Telephone Information:   Mobile [phone]       Will anyone else be joining the visit? NO  (If patient encounters technical issues they should call [phone]    Are changes needed to the allergy or medication list? No    Are refills needed on medications prescribed by this physician? YES    Rooming Documentation:  Questionnaire(s) completed    Reason for visit: RECHECK    Kj TOTH

## 2024-10-02 NOTE — PROGRESS NOTES
Virtual Visit Details    Type of service:  Video Visit        Originating Location (pt. Location): Home    Distant Location (provider location):  On-site  Platform used for Video Visit: Sheri

## 2024-10-02 NOTE — PROGRESS NOTES
Joined the call at 10/2/2024, 1:23:46 pm.  Left the call at 10/2/2024, 1:46:07 pm.  You were on the call for 22 minutes 21 seconds .    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 He was recently seen by Dr. Goodwin and underwent EUS with transluminal drainage of 34t89mk pseudocyst in the pancreatic tail through placement of LAMS and double pigtailed plastic stents and subsequent removal of LAMS with maintained plastic stents. He now reports that his chronic left sided / epigastric pain was initially improved somewhat after his most recent cystgastrostomy however pain has returned to largely same quality / severity as pre-procedure. His post-intervention CT shows appropriately placed plastic stents without complication and no identifiable target for further endoscopic intervention. Discussed options to consider for further management including an evaluation with [facility] clinic to consider interventional options as well as referral for consideration of total pancreatectomy with islet autotransplantation. Discussed that he would likely need some degree of weight loss prior to any surgical intervention and, if he does pursue this option further, would refer for bariatric endoscopic evaluation as well.  - Will obtain A1c  - Referral for [facility]  - Referral to Dr. Montes for evaluation of TPIAT  - Would consider bariatric endoscopic evaluation if deciding to pursue TPIAT    Patient seen and discussed with Dr. Lay who agrees with this plan.    Juan Jose Mendoza MD  PGY-3 Internal Medicine    Seen and examined with GI fellow, agree with findings and recommendations.  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    Abiel reports that since his recent cystgastrostomy with Dr. Goodwin, he initially noticed an improvement in his pain symptoms for about 2 weeks and was able to be more active during that time. Unfortunately, it seems that after that his symptoms progressed to his prior baseline of chronic, daily, left sided / epigastric pain. This pain limits his daily functioning - was working as a  in 2018 prior to first episode of acute gallstone pancreatitis in 2019 that was complicated by WON treated at [facility] with lumen opposing metal stent and ERCP. He suffered recurrent episodes of acute pancreatitis and pseudocyst afterwards that left him largely unable to work for prolonged periods of time. His pain has been managed by [facility] pain clinic with cymbalta and tylenol with minimal relief. He has been tolerating pancreatic enzyme replacement well which has allowed him to maintain a diet. Has had a stable weight since last visit. Thinks he is around 250 pounds at 6 feet tall.     Review of systems:  10 point ROS negative except as noted above.    Past Medical History:   Diagnosis Date    Chronic recurrent pancreatitis (H)     Diabetes (H)     Gastroesophageal reflux disease     Hyperparathyroidism (H)     Nonischemic cardiomyopathy (H)     Obese     Other chronic pain     Sleep apnea         Current Outpatient Medications   Medication Sig Dispense Refill    acetaminophen (TYLENOL) 500 MG tablet Take 1,000 mg by mouth every 8 hours as needed      albuterol (PROAIR HFA/PROVENTIL HFA/VENTOLIN HFA) 108 (90 Base) MCG/ACT inhaler Inhale 2 puffs into the lungs every 4 hours as needed for shortness of breath,  wheezing or cough      alum & mag hydroxide-simethicone (CONSTANTINO-LANTA) 200-200-20 MG/5ML SUSP suspension Take 15 mLs by mouth every 4 hours as needed      amitriptyline (ELAVIL) 25 MG tablet Take 25 mg by mouth at bedtime      amLODIPine (NORVASC) 10 MG tablet Take 1 tablet by mouth daily      carvedilol (COREG) 25 MG tablet Take 50 mg by mouth 2 times daily (with meals)      Continuous Glucose Sensor (DEXCOM G6 SENSOR) MISC CHANGE SENSOR EVERY 10 DAYS      Continuous Glucose Transmitter (DEXCOM G6 TRANSMITTER) MISC CHANGE EVERY 90 DAYS AS DIRECTED      DULoxetine (CYMBALTA) 60 MG capsule Take 60 mg by mouth daily      hydrALAZINE (APRESOLINE) 100 MG tablet Take 100 mg by mouth 3 times daily      isosorbide mononitrate (IMDUR) 30 MG 24 hr tablet Take 90 mg by mouth at bedtime      Lidocaine (LIDOCARE) 4 % Patch Place 1-3 patches onto the skin      lipase-protease-amylase (CREON) 19276-26[address]-186100 units CPEP per EC capsule Take 2-3 with meals / 1-2 with snacks, up to 15 per day. 450 capsule 6    metFORMIN (GLUCOPHAGE) 1000 MG tablet Take 1,000 mg by mouth 2 times daily (with meals)      methylPREDNISolone (MEDROL) 32 MG tablet Take 1 tablet (32 mg) by mouth 2 times daily as needed (for contrast allergy, take 1 tablet 12 hours prior to radiology scan, and 1 tablet 2 hours prior to radiology scan). 2 tablet 0    Multiple Vitamins-Minerals (SUPER THERA TOÑO M) TABS Take 1 tablet by mouth daily      ondansetron (ZOFRAN ODT) 4 MG ODT tab Place 4 mg under the tongue every 8 hours as needed      oxyCODONE (ROXICODONE) 5 MG tablet Take 1 tablet (5 mg) by mouth every 6 hours as needed for severe pain 9 tablet 0    pantoprazole (PROTONIX) 40 MG EC tablet Take 40 mg by mouth 2 times daily (with meals)      polyethylene glycol (MIRALAX) 17 g packet Take 17 g by mouth daily as needed      pregabalin (LYRICA) 150 MG capsule Take 1 capsule by mouth 3 times daily       No current facility-administered medications for this visit.         OBJECTIVE:  Physical exam:  There were no vitals taken for this visit.  There is no height or weight on file to calculate BMI.     Limited due to virtual visit:    Gen: Well-developed, well-nourished and in no apparent distress  Resp: normal respiratory effort, speaking in full sentences without difficulty  Psych: normal mood, normal affect, alert and oriented x3, a bit tired appearing    Recent Labs:         Lab Results   Component Value Date     07/18/2024    Lab Results   Component Value Date    CHLORIDE 103 07/18/2024    Lab Results   Component Value Date    BUN 18.6 07/18/2024      Lab Results   Component Value Date    POTASSIUM 3.9 07/18/2024    Lab Results   Component Value Date    CO2 22 07/18/2024    Lab Results   Component Value Date    CR 1.3 08/28/2024    CR 1.45 07/18/2024        Lab Results   Component Value Date    WBC 12.0 (H) 08/28/2024    WBC 13.6 (H) 07/18/2024    HGB 13.7 08/28/2024    HGB 13.6 07/18/2024    HCT 40.6 08/28/2024    HCT 40.1 07/18/2024    MCV 89 08/28/2024    MCV 91 07/18/2024     08/28/2024     07/18/2024     Lab Results   Component Value Date    AST 18 07/18/2024    ALT <5 07/18/2024    ALKPHOS 68 07/18/2024    BILITOTAL 0.7 07/18/2024     Lipase   Date Value Ref Range Status   08/28/2024 13 13 - 60 U/L Final     Recent Imaging:    CT A/P Post Cystgastrostomy    Abdomen and pelvis:  Liver: Unremarkable  Biliary System: Cholecystectomy. No intrahepatic or extrahepatic  biliary ductal dilation.  Pancreas: Mild fatty infiltration of the pancreatic head. No  peripancreatic inflammatory stranding. Pigtail stent tube visualized  with the proximal loop in the gastric lumen, distal loop within the  pancreatic duct at the tail. No focal fluid collections or  calcifications along the length of the pancreas.  Adrenal glands: Unremarkable  Spleen: Unremarkable  Kidneys: Unremarkable. No stones, hydronephrosis, or cortical  deforming masses. The visualized ureters are  unremarkable. Symmetric  cortical medullary enhancement.     Gastrointestinal tract: The visualized esophagus and stomach is  unremarkable. The duodenum and visualized small bowel is unremarkable.  The visualized portions of the colon are unremarkable. Normal caliber  small and large bowel.     Mesentery/peritoneum/retroperitoneum: No free intra-abdominal air,  free fluid, or abdominal masses.  Lymph nodes: No abdominal adenopathy. A few minimally prominent  gastrohepatic nodes.  Vasculature: Patent nonaneurysmal abdominal aorta. Branch vasculature.  The portal vein, splenic vein, superior mesenteric vein is patent  without evidence for obstruction.     Bones: Mild thoracolumbar degenerative changes within the visualized  portions. No fractures no high-grade osseous lesions.     Soft Tissues: Unremarkable                                                                         IMPRESSION:   1. No CT evidence to suggest pancreatitis or pancreatic pseudocyst.  There is a pigtail stent tube in place within the stomach lumen and  pancreatic tail. No surrounding inflammatory changes of the pancreas.   2. Cholecystectomy, no secondary signs of inflammation.   3. No acute findings within the visualized abdomen.

## 2024-10-02 NOTE — PATIENT INSTRUCTIONS
Follow up:    Dr. Lay has outlined the following steps after your recent clinic visit:      - Will obtain A1c- this can be drawn at any [facility] lab    - Referral for [facility]- they will reach out    - Referral to Dr. Montes for evaluation of TPIAT- they will reach out   - Would consider bariatric endoscopic evaluation if deciding to pursue TPIAT        Please call with any questions or concerns regarding your clinic visit today.    It is a pleasure being involved in your health care.    Contacts post-consultation depending on your need:    Schedule Clinic Appointments            [phone] # 1   M-F 7:30 - 5 pm    Yeimi Goodwin, RN Care Coordinator (Dr. Menendez/Dr. Lay)    [phone]    Paula Hernandez, RN Care Coordinator (Dr. Uribe/Dr. Campos)                            [phone]    Alison Del Angel RN Care Coordinator (Dr. Villafuerte/Dr. Hodge)    [phone]    MYKE Mancia  [phone]    Bela Lu, Advanced Endoscopy  (all MDs)     [phone]      For urgent/emergent questions after business hours, you may reach the on-call GI Fellow by contacting the [facility]  at [phone].    How do I schedule labs, imaging studies, or procedures that were ordered in clinic today?     Labs: To schedule lab appointment at the [facility], use my chart or call [phone]. If you have a [facility] lab closer to home where you are regularly seen you can give them a call.     Procedures: If a colonoscopy, upper endoscopy, breath test, esophageal manometry, or pH impedence was ordered today, our endoscopy team will call you to schedule this. If you have not heard from our endoscopy team within a week, please call [phone] to schedule.     Imaging Studies: If you were scheduled for a CT scan, X-ray, MRI, ultrasound, HIDA scan or other imaging study, please call [phone] to have this scheduled.     Referral: If a  referral to another specialty was ordered, expect a phone call or follow instructions above. If you have not heard from anyone regarding your referral in a week, please call our clinic to check the status.     How to I schedule a follow-up visit?  If you did not schedule a follow-up visit today, please call [phone] to schedule a follow-up office visit.

## 2024-10-02 NOTE — LETTER
10/2/2024      Abiel Weeks  [address]  [place] [address]      Dear Colleague,    Thank you for referring your patient, Abiel Weeks, to the [facility] [place]. Please see a copy of my visit note below.    Virtual Visit Details    Type of service:  Video Visit        Originating Location (pt. Location): Home    Distant Location (provider location):  On-site  Platform used for Video Visit: Sheri      Joined the call at 10/2/2024, 1:23:46 pm.  Left the call at 10/2/2024, 1:46:07 pm.  You were on the call for 22 minutes 21 seconds .    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 He was recently seen by Dr. Goodwin and underwent EUS with transluminal drainage of [id number] pseudocyst in the pancreatic tail through placement of LAMS and double pigtailed plastic stents and subsequent removal of LAMS with maintained plastic stents. He now reports that his chronic left sided / epigastric pain was initially improved somewhat after his most recent cystgastrostomy however pain has returned to largely same quality / severity as pre-procedure. His post-intervention CT shows appropriately placed plastic stents without complication and no identifiable target for further endoscopic intervention. Discussed options to consider for further management including an evaluation with [facility] clinic to consider interventional options as well as referral for consideration of total pancreatectomy with islet autotransplantation. Discussed that he would likely need some degree of weight loss prior to any surgical intervention and, if he does pursue this option further, would refer for bariatric endoscopic evaluation as well.  - Will obtain A1c  - Referral for [facility]  Pain clinic  - Referral to Dr. Montes for evaluation of TPIAT  - Would consider bariatric endoscopic evaluation if deciding to pursue TPIAT    Patient seen and discussed with Dr. Lay who agrees with this plan.    Juan Jose Mendoza MD  PGY-3 Internal Medicine    Seen and examined with GI fellow, agree with findings and recommendations.  Total 30 minutes together w patient, including data review  Simon Lay MD GI Staff     SUBJECTIVE:   Abiel Weeks is a 39 year old male with PMH of necrotizing gallstone pancreatitis with recurrent acute pancreatitis c/b stricture in the partially atrophied body/tail with upstream pseudocyst and suspected disconnected duct, GERD, KAYLA, diabetes here for follow up evaluation of recurrent acute pancreatitis / chronic pancreatitis.    Abiel reports that since his recent cystgastrostomy with Dr. Goodwin, he initially noticed an improvement in his pain symptoms for about 2 weeks and was able to be more active during that time. Unfortunately, it seems that after that his symptoms progressed to his prior baseline of chronic, daily, left sided / epigastric pain. This pain limits his daily functioning - was working as a  in 2018 prior to first episode of acute gallstone pancreatitis in 2019 that was complicated by WON treated at [facility] with lumen opposing metal stent and ERCP. He suffered recurrent episodes of acute pancreatitis and pseudocyst afterwards that left him largely unable to work for prolonged periods of time. His pain has been managed by [facility] with cymbalta and tylenol with minimal relief. He has been tolerating pancreatic enzyme replacement well which has allowed him to maintain a diet. Has had a stable weight since last visit. Thinks he is around 250 pounds at 6 feet tall.     Review of systems:  10 point ROS negative except as noted above.    Past Medical History:   Diagnosis Date     Chronic recurrent pancreatitis (H)      Diabetes (H)       Gastroesophageal reflux disease      Hyperparathyroidism (H)      Nonischemic cardiomyopathy (H)      Obese      Other chronic pain      Sleep apnea         Current Outpatient Medications   Medication Sig Dispense Refill     acetaminophen (TYLENOL) 500 MG tablet Take 1,000 mg by mouth every 8 hours as needed       albuterol (PROAIR HFA/PROVENTIL HFA/VENTOLIN HFA) 108 (90 Base) MCG/ACT inhaler Inhale 2 puffs into the lungs every 4 hours as needed for shortness of breath, wheezing or cough       alum & mag hydroxide-simethicone (CONSTANTINO-LANTA) 200-200-20 MG/5ML SUSP suspension Take 15 mLs by mouth every 4 hours as needed       amitriptyline (ELAVIL) 25 MG tablet Take 25 mg by mouth at bedtime       amLODIPine (NORVASC) 10 MG tablet Take 1 tablet by mouth daily       carvedilol (COREG) 25 MG tablet Take 50 mg by mouth 2 times daily (with meals)       Continuous Glucose Sensor (DEXCOM G6 SENSOR) MISC CHANGE SENSOR EVERY 10 DAYS       Continuous Glucose Transmitter (DEXCOM G6 TRANSMITTER) MISC CHANGE EVERY 90 DAYS AS DIRECTED       DULoxetine (CYMBALTA) 60 MG capsule Take 60 mg by mouth daily       hydrALAZINE (APRESOLINE) 100 MG tablet Take 100 mg by mouth 3 times daily       isosorbide mononitrate (IMDUR) 30 MG 24 hr tablet Take 90 mg by mouth at bedtime       Lidocaine (LIDOCARE) 4 % Patch Place 1-3 patches onto the skin       lipase-protease-amylase (CREON) 26950-95168-447679 units CPEP per EC capsule Take 2-3 with meals / 1-2 with snacks, up to 15 per day. 450 capsule 6     metFORMIN (GLUCOPHAGE) 1000 MG tablet Take 1,000 mg by mouth 2 times daily (with meals)       methylPREDNISolone (MEDROL) 32 MG tablet Take 1 tablet (32 mg) by mouth 2 times daily as needed (for contrast allergy, take 1 tablet 12 hours prior to radiology scan, and 1 tablet 2 hours prior to radiology scan). 2 tablet 0     Multiple Vitamins-Minerals (SUPER THERA TOÑO M) TABS Take 1 tablet by mouth daily       ondansetron (ZOFRAN ODT) 4 MG ODT tab  Place 4 mg under the tongue every 8 hours as needed       oxyCODONE (ROXICODONE) 5 MG tablet Take 1 tablet (5 mg) by mouth every 6 hours as needed for severe pain 9 tablet 0     pantoprazole (PROTONIX) 40 MG EC tablet Take 40 mg by mouth 2 times daily (with meals)       polyethylene glycol (MIRALAX) 17 g packet Take 17 g by mouth daily as needed       pregabalin (LYRICA) 150 MG capsule Take 1 capsule by mouth 3 times daily       No current facility-administered medications for this visit.        OBJECTIVE:  Physical exam:  There were no vitals taken for this visit.  There is no height or weight on file to calculate BMI.     Limited due to virtual visit:    Gen: Well-developed, well-nourished and in no apparent distress  Resp: normal respiratory effort, speaking in full sentences without difficulty  Psych: normal mood, normal affect, alert and oriented x3, a bit tired appearing    Recent Labs:         Lab Results   Component Value Date     07/18/2024    Lab Results   Component Value Date    CHLORIDE 103 07/18/2024    Lab Results   Component Value Date    BUN 18.6 07/18/2024      Lab Results   Component Value Date    POTASSIUM 3.9 07/18/2024    Lab Results   Component Value Date    CO2 22 07/18/2024    Lab Results   Component Value Date    CR 1.3 08/28/2024    CR 1.45 07/18/2024        Lab Results   Component Value Date    WBC 12.0 (H) 08/28/2024    WBC 13.6 (H) 07/18/2024    HGB 13.7 08/28/2024    HGB 13.6 07/18/2024    HCT 40.6 08/28/2024    HCT 40.1 07/18/2024    MCV 89 08/28/2024    MCV 91 07/18/2024     08/28/2024     07/18/2024     Lab Results   Component Value Date    AST 18 07/18/2024    ALT <5 07/18/2024    ALKPHOS 68 07/18/2024    BILITOTAL 0.7 07/18/2024     Lipase   Date Value Ref Range Status   08/28/2024 13 13 - 60 U/L Final     Recent Imaging:    CT A/P Post Cystgastrostomy    Abdomen and pelvis:  Liver: Unremarkable  Biliary System: Cholecystectomy. No intrahepatic or  extrahepatic  biliary ductal dilation.  Pancreas: Mild fatty infiltration of the pancreatic head. No  peripancreatic inflammatory stranding. Pigtail stent tube visualized  with the proximal loop in the gastric lumen, distal loop within the  pancreatic duct at the tail. No focal fluid collections or  calcifications along the length of the pancreas.  Adrenal glands: Unremarkable  Spleen: Unremarkable  Kidneys: Unremarkable. No stones, hydronephrosis, or cortical  deforming masses. The visualized ureters are unremarkable. Symmetric  cortical medullary enhancement.     Gastrointestinal tract: The visualized esophagus and stomach is  unremarkable. The duodenum and visualized small bowel is unremarkable.  The visualized portions of the colon are unremarkable. Normal caliber  small and large bowel.     Mesentery/peritoneum/retroperitoneum: No free intra-abdominal air,  free fluid, or abdominal masses.  Lymph nodes: No abdominal adenopathy. A few minimally prominent  gastrohepatic nodes.  Vasculature: Patent nonaneurysmal abdominal aorta. Branch vasculature.  The portal vein, splenic vein, superior mesenteric vein is patent  without evidence for obstruction.     Bones: Mild thoracolumbar degenerative changes within the visualized  portions. No fractures no high-grade osseous lesions.     Soft Tissues: Unremarkable                                                                         IMPRESSION:   1. No CT evidence to suggest pancreatitis or pancreatic pseudocyst.  There is a pigtail stent tube in place within the stomach lumen and  pancreatic tail. No surrounding inflammatory changes of the pancreas.   2. Cholecystectomy, no secondary signs of inflammation.   3. No acute findings within the visualized abdomen.          Again, thank you for allowing me to participate in the care of your patient.        Sincerely,        Simon Lay MD

## 2024-10-10 NOTE — TELEPHONE ENCOUNTER
REFERRAL INFORMATION:  Referring Provider:  Simon Lay MD   Referring Clinic[place]  [facility]   Reason for Visit/Diagnosis: K86.1 (ICD-10-CM) - Chronic recurrent pancreatitis (H)        FUTURE VISIT INFORMATION:  Appointment Date: 11/4/24  Appointment Time:      NOTES RECORD STATUS  DETAILS   OFFICE NOTE from Referring Provider Internal 10/2/24   OFFICE NOTE from Other Specialists Internal 8/29/24-Dr. Villafuerte-[facility]    6/5/24-Danya Vila RD    [facility]:  3/13/24-Dr. Castillo   [facility] DISCHARGE SUMMARY/ ED VISITS  Care Everywhere Admit 1/29/24-1/31/24-[facility]   Admit-1/23/24-1/25/24-[facility]  ED-1/3/24-[facility]  Admit 6/12/23-6/16/23-[facility]   OPERATIVE REPORT Care Everywhere    ENDOSCOPY (EGD)  Internal 7/25/24   PERTINENT LABS Care Everywhere 7/16/19-laparoscopic cholecystectomy, possible open cholecystectomy -Dr. Hall    PATHOLOGY REPORTS (RELATED) N/A    IMAGING (CT, MRI, US, XR)  Internal 8/28/24, 1/23/24, 1/3/24-CT abd pel  5/30/24, 9/8/23-MR abdomen     Records Requested    Facility    Fax:    Outcome

## 2024-10-14 ENCOUNTER — TRANSFERRED RECORDS (OUTPATIENT)
Dept: HEALTH INFORMATION MANAGEMENT | Facility: CLINIC | Age: 39
End: 2024-10-14
Payer: COMMERCIAL

## 2024-11-04 ENCOUNTER — PRE VISIT (OUTPATIENT)
Dept: SURGERY | Facility: CLINIC | Age: 39
End: 2024-11-04

## 2024-11-15 ENCOUNTER — REFERRAL (OUTPATIENT)
Dept: TRANSPLANT | Facility: CLINIC | Age: 39
End: 2024-11-15

## 2024-11-15 DIAGNOSIS — K86.1 CHRONIC PANCREATITIS (H): Primary | ICD-10-CM

## 2024-11-15 NOTE — LETTER
November 19, 2024    Abiel Weeks  [address]  [place] [address]    Dear Abiel,    Thank you for your interest in the Transplant Center at [facility]. We look forward to being a part of your care team and assisting you through the transplant process.    As we discussed, your transplant coordinator is Iglesia Chandler ([facility]).  You may call your coordinator at any time with questions or concerns.  Your first scheduled call will be on 11/26/2024 between 9:00 am-12:00 pm.  If this needs to change, call [phone].    Please complete the following.    Fill out and return the enclosed forms  Authorization for Electronic Communication  Authorization to Discuss Protected Health Information  Authorization for Release of Protected Health Information    Sign up for:  [employer], access to your electronic medical record (see enclosed pamphlet)  [email or web address], a transplant education website    You can use these tools to learn more about your transplant, communicate with your care team, and track your medical details       My Transplant Place    Sincerely,    [facility]  Solid Organ Transplant Care Programs  [phone], Option 5    cc: Referring Physician

## 2024-11-19 ENCOUNTER — CARE COORDINATION (OUTPATIENT)
Dept: TRANSPLANT | Facility: CLINIC | Age: 39
End: 2024-11-19
Payer: COMMERCIAL

## 2024-11-19 VITALS — BODY MASS INDEX: 37.52 KG/M2 | HEIGHT: 72 IN | WEIGHT: 277 LBS

## 2024-11-19 PROBLEM — F33.1 MODERATE EPISODE OF RECURRENT MAJOR DEPRESSIVE DISORDER (H): Status: ACTIVE | Noted: 2024-03-18

## 2024-11-19 PROBLEM — I10 ESSENTIAL HYPERTENSION: Status: ACTIVE | Noted: 2019-02-24

## 2024-11-19 PROBLEM — G47.33 OSA (OBSTRUCTIVE SLEEP APNEA): Status: ACTIVE | Noted: 2019-04-17

## 2024-11-19 PROBLEM — G89.4 CHRONIC PAIN DISORDER: Status: ACTIVE | Noted: 2023-07-06

## 2024-11-19 PROBLEM — I42.8 NONISCHEMIC CARDIOMYOPATHY (H): Status: ACTIVE | Noted: 2024-11-19

## 2024-11-19 PROBLEM — F41.1 GAD (GENERALIZED ANXIETY DISORDER): Status: ACTIVE | Noted: 2024-03-18

## 2024-11-19 PROBLEM — E21.3 HYPERPARATHYROIDISM (H): Status: ACTIVE | Noted: 2019-03-11

## 2024-11-19 PROBLEM — E11.22 TYPE 2 DIABETES MELLITUS WITH CHRONIC KIDNEY DISEASE, WITHOUT LONG-TERM CURRENT USE OF INSULIN, UNSPECIFIED CKD STAGE (H): Status: ACTIVE | Noted: 2024-01-05

## 2024-11-19 PROBLEM — R74.01 ELEVATED TRANSAMINASE LEVEL: Status: ACTIVE | Noted: 2023-06-13

## 2024-11-19 PROBLEM — K86.1 CHRONIC RECURRENT PANCREATITIS (H): Status: ACTIVE | Noted: 2019-03-25

## 2024-11-19 PROBLEM — K86.3 PANCREATIC PSEUDOCYST: Status: ACTIVE | Noted: 2019-10-23

## 2024-11-19 NOTE — TELEPHONE ENCOUNTER
The following questions were asked during patients intake call.       CARE TEAM  PCP: Dr. Brandon Sinclair    GI: Simon Lay MD ([facility])  Pain Management: [facility]. In past went to [facility]  Endocrinologist: None  Mental Health Provider: Purvi Valente with [facility].   Other Specialists: Cardiology once yearly [facility]    Insurance Information: [employer] ID: [id number] group: [id number]       Screening Questions  1. Why are you considering a total pancreatectomy/islet auto-transplant?  Due to chronic pain    2. Have you been in the hospital in the last six months? No  If yes:  a. Please give the name and address of the hospital:                b. When were you there and why?   3. Have you had any surgeries or procedures? 7/6/2023 ERCP with Prophylactic PD stent placement     4. Have you ever smoked? Cigarillos 10-20 yrs ago        5. Do you drink alcohol? No. Last drink 10yrs ago    6. Drug use Past Present Frequency: Marijuana use daily for pain    7. Do you have a history of alcohol or other drug abuse?  None  If yes:  a. How long have you been sober or drug-free? Using Marijuana no alcohol use     PMH  8.Previous transplant No    9.Heart disease CHF and hypertension    10.Diabetes Type 2. No insulin use. Controlled by Metformin    11.Cancer None    12.Biopsy None    13.Genetic testing None    WRAP UP    Referral intake process completed.  Patient is aware that after financial approval is received, medical records will be requested.   Confirmed coordinator will discuss evaluation process in more detail at the time of their call.   Patient instructed to call assigned coordinator with questions.      Patient gave verbal consent during intake call to obtain medical records and documents outside of [facility]:  Yes  Patient agrees to Docusign Yes

## 2024-11-26 ENCOUNTER — TELEPHONE (OUTPATIENT)
Dept: TRANSPLANT | Facility: CLINIC | Age: 39
End: 2024-11-26
Payer: COMMERCIAL

## 2024-11-26 NOTE — TELEPHONE ENCOUNTER
[facility]  Total Pancreatectomy - [facility] Auto[facility]plant   Pre Candidate Evaluation Summary  Abiel Weeks  Age/ : 1985 / 39 year old   GI MD: [facility]   City/State:   Diagnosis: Idiopathic Pancreatitis; necrotizing gallstone pancreatitis   Smoker: []Yes  []No  [x]Former (Quit 15 years ago)  Alcohol: []Yes  []No [x]Former (Quit )  BMI:  37  A1C: 6.0      Brief Hx:   Abiel Weeks is a 39 y.o. male with a history of necrotizing gallstone pancreatitis with recurrent acute pancreatitis c/b stricture in the partially atrophied body/tail with upstream pseudocyst and suspected disconnected duct, type 2 diabetes (managed on metformin), nonischemic cardiomyopathy and hypertension. He is on lyrica, Elavil and Cymbalta for pain.     Current Outpatient Medications   Medication Sig Dispense Refill    acetaminophen (TYLENOL) 500 MG tablet Take 1,000 mg by mouth every 8 hours as needed      albuterol (PROAIR HFA/PROVENTIL HFA/VENTOLIN HFA) 108 (90 Base) MCG/ACT inhaler Inhale 2 puffs into the lungs every 4 hours as needed for shortness of breath, wheezing or cough      alum & mag hydroxide-simethicone (CONSTANTINO-LANTA) 200-200-20 MG/5ML SUSP suspension Take 15 mLs by mouth every 4 hours as needed      amitriptyline (ELAVIL) 25 MG tablet Take 25 mg by mouth at bedtime      amLODIPine (NORVASC) 10 MG tablet Take 1 tablet by mouth daily      carvedilol (COREG) 25 MG tablet Take 50 mg by mouth 2 times daily (with meals)      Continuous Glucose Sensor (DEXCOM G6 SENSOR) MISC CHANGE SENSOR EVERY 10 DAYS      Continuous Glucose Transmitter (DEXCOM G6 TRANSMITTER) MISC CHANGE EVERY 90 DAYS AS DIRECTED      DULoxetine (CYMBALTA) 60 MG capsule Take 60 mg by mouth daily      hydrALAZINE (APRESOLINE) 100 MG tablet Take 100 mg by mouth 3 times daily      isosorbide mononitrate (IMDUR) 30 MG 24 hr tablet Take 90 mg by mouth at bedtime      Lidocaine (LIDOCARE) 4 % Patch Place 1-3 patches onto the skin       lipase-protease-amylase (CREON) 32959-52183-900672 units CPEP per EC capsule Take 2-3 with meals / 1-2 with snacks, up to 15 per day. 450 capsule 6    metFORMIN (GLUCOPHAGE) 1000 MG tablet Take 1,000 mg by mouth 2 times daily (with meals)      methylPREDNISolone (MEDROL) 32 MG tablet Take 1 tablet (32 mg) by mouth 2 times daily as needed (for contrast allergy, take 1 tablet 12 hours prior to radiology scan, and 1 tablet 2 hours prior to radiology scan). 2 tablet 0    Multiple Vitamins-Minerals (SUPER THERA TOÑO M) TABS Take 1 tablet by mouth daily      ondansetron (ZOFRAN ODT) 4 MG ODT tab Place 4 mg under the tongue every 8 hours as needed      oxyCODONE (ROXICODONE) 5 MG tablet Take 1 tablet (5 mg) by mouth every 6 hours as needed for severe pain 9 tablet 0    pantoprazole (PROTONIX) 40 MG EC tablet Take 40 mg by mouth 2 times daily (with meals)      polyethylene glycol (MIRALAX) 17 g packet Take 17 g by mouth daily as needed      pregabalin (LYRICA) 150 MG capsule Take 1 capsule by mouth 3 times daily         Imaging (Most recent)   MRCP/Secretin Date: 5/30/24 Where done: [facility]    IMPRESSION:   1. Slight interval enlargement of pseudocyst in the pancreatic tail  region measuring up to 3.1 cm, previously 2.5 cm.   2. Moderate atrophy of the distal pancreatic body and tail with few  dilated sidebranches in the tail region. Main pancreatic duct is  non-dilated.   3. Normal pancreatic exocrine function.  4. Diffuse gastric wall thickening may relate to gastritis.      EUS: Date: 7/18/24 Where done: Dr Villafuerte    Results:  - 3 mm pancreatic pseudocyst with heterogenous contents suggestive of sludge/debris s/p DIANA stent (10 x 10 mm Axios) and plastic (10 F x 4 cm Avalos) stent placement into the pseudocyst with adequate drainage     CT: Date: 8/28/24 Where done: Dr Villafuerte     Results: IMPRESSION:   1. No CT evidence to suggest pancreatitis or pancreatic pseudocyst.  There is a pigtail stent tube in place within the  stomach lumen and  pancreatic tail. No surrounding inflammatory changes of the pancreas.   2. Cholecystectomy, no secondary signs of inflammation.   3. No acute findings within the visualized abdomen.     ERCP:Date: 7/21/23 Where done: [facility]     IMPRESSION: Images from endoscopy demonstrate opacification of a portion of the pancreatic duct and a wire within the pancreatic duct. Initial images Double pigtail tube in the region of the left upper quadrant, likely a cyst gastrostomy, final images demonstrate removal of the drain. There appears to be some contrast within the stomach on the final images well.. See operative endoscopy report for full details.     Stents: [x]Yes Dates:   [] No.   [] Duration of relief:   Celiac Block: Date:   [] Duration of relief:   Autoimmune workup:  []Yes [x] No    Genetic testing:      []Yes [x] No   Unprovoked Amylase/Lipase:   Date:             Lipase  3/13/19 2084.8    Past Medical History:   Diagnosis Date    Chronic recurrent pancreatitis (H)     Diabetes (H)     Gastroesophageal reflux disease     Hyperparathyroidism (H)     Nonischemic cardiomyopathy (H)     Obese     Other chronic pain     Sleep apnea      Past Surgical History:   Procedure Laterality Date    ENDOSCOPIC ULTRASOUND UPPER GASTROINTESTINAL TRACT (GI) N/A 7/18/2024    Procedure: ENDOSCOPIC ULTRASOUND, ESOPHAGOSCOPY / UPPER GASTROINTESTINAL TRACT (GI) with cystgastrostomy;  Surgeon: Murtaza Villafuerte MD;  Location:  OR    ESOPHAGOSCOPY, GASTROSCOPY, DUODENOSCOPY (EGD), COMBINED N/A 7/25/2024    Procedure: ESOPHAGOGASTRODUODENOSCOPY with flouroscopy with Axios stent removal;  Surgeon: Murtaza Villafuerte MD;  Location:  OR

## 2024-12-21 ENCOUNTER — HEALTH MAINTENANCE LETTER (OUTPATIENT)
Age: 39
End: 2024-12-21

## 2025-01-23 DIAGNOSIS — I42.8 NONISCHEMIC CARDIOMYOPATHY (H): ICD-10-CM

## 2025-01-23 DIAGNOSIS — K86.3 PANCREATIC PSEUDOCYST: ICD-10-CM

## 2025-01-23 DIAGNOSIS — E11.22 TYPE 2 DIABETES MELLITUS WITH CHRONIC KIDNEY DISEASE, WITHOUT LONG-TERM CURRENT USE OF INSULIN, UNSPECIFIED CKD STAGE (H): ICD-10-CM

## 2025-01-23 DIAGNOSIS — E21.3 HYPERPARATHYROIDISM: ICD-10-CM

## 2025-01-23 DIAGNOSIS — K86.1 CHRONIC RECURRENT PANCREATITIS (H): ICD-10-CM

## 2025-01-23 DIAGNOSIS — G89.4 CHRONIC PAIN DISORDER: Primary | ICD-10-CM

## 2025-01-28 ENCOUNTER — HOSPITAL ENCOUNTER (OUTPATIENT)
Dept: BEHAVIORAL HEALTH | Facility: CLINIC | Age: 40
Discharge: HOME | End: 2025-01-28
Attending: PSYCHIATRY & NEUROLOGY | Admitting: PSYCHIATRY & NEUROLOGY
Payer: COMMERCIAL

## 2025-01-28 VITALS — BODY MASS INDEX: 36.57 KG/M2 | WEIGHT: 270 LBS | HEIGHT: 72 IN

## 2025-01-28 DIAGNOSIS — I42.8 NONISCHEMIC CARDIOMYOPATHY (H): ICD-10-CM

## 2025-01-28 DIAGNOSIS — F10.21 ALCOHOL USE DISORDER, SEVERE, IN SUSTAINED REMISSION (H): Primary | ICD-10-CM

## 2025-01-28 DIAGNOSIS — E11.22 TYPE 2 DIABETES MELLITUS WITH CHRONIC KIDNEY DISEASE, WITHOUT LONG-TERM CURRENT USE OF INSULIN, UNSPECIFIED CKD STAGE (H): ICD-10-CM

## 2025-01-28 DIAGNOSIS — G89.4 CHRONIC PAIN DISORDER: ICD-10-CM

## 2025-01-28 DIAGNOSIS — K86.3 PANCREATIC PSEUDOCYST: ICD-10-CM

## 2025-01-28 DIAGNOSIS — K86.1 CHRONIC RECURRENT PANCREATITIS (H): ICD-10-CM

## 2025-01-28 DIAGNOSIS — E21.3 HYPERPARATHYROIDISM: ICD-10-CM

## 2025-01-28 PROCEDURE — H0001 ALCOHOL AND/OR DRUG ASSESS: HCPCS

## 2025-01-28 ASSESSMENT — COLUMBIA-SUICIDE SEVERITY RATING SCALE - C-SSRS
3. HAVE YOU BEEN THINKING ABOUT HOW YOU MIGHT KILL YOURSELF?: NO
TOTAL  NUMBER OF ABORTED OR SELF INTERRUPTED ATTEMPTS LIFETIME: NO
1. IN THE PAST MONTH, HAVE YOU WISHED YOU WERE DEAD OR WISHED YOU COULD GO TO SLEEP AND NOT WAKE UP?: NO
2. HAVE YOU ACTUALLY HAD ANY THOUGHTS OF KILLING YOURSELF?: SEE ABOVE
1. HAVE YOU WISHED YOU WERE DEAD OR WISHED YOU COULD GO TO SLEEP AND NOT WAKE UP?: YES
TOTAL  NUMBER OF INTERRUPTED ATTEMPTS LIFETIME: NO
2. HAVE YOU ACTUALLY HAD ANY THOUGHTS OF KILLING YOURSELF?: NO
2. HAVE YOU ACTUALLY HAD ANY THOUGHTS OF KILLING YOURSELF?: YES
ATTEMPT LIFETIME: NO
5. HAVE YOU STARTED TO WORK OUT OR WORKED OUT THE DETAILS OF HOW TO KILL YOURSELF? DO YOU INTEND TO CARRY OUT THIS PLAN?: NO
6. HAVE YOU EVER DONE ANYTHING, STARTED TO DO ANYTHING, OR PREPARED TO DO ANYTHING TO END YOUR LIFE?: NO
4. HAVE YOU HAD THESE THOUGHTS AND HAD SOME INTENTION OF ACTING ON THEM?: NO
REASONS FOR IDEATION LIFETIME: COMPLETELY TO END OR STOP THE PAIN (YOU COULDN'T GO ON LIVING WITH THE PAIN OR HOW YOU WERE FEELING)

## 2025-01-28 ASSESSMENT — PATIENT HEALTH QUESTIONNAIRE - PHQ9
SUM OF ALL RESPONSES TO PHQ QUESTIONS 1-9: 7
SUM OF ALL RESPONSES TO PHQ QUESTIONS 1-9: 7
10. IF YOU CHECKED OFF ANY PROBLEMS, HOW DIFFICULT HAVE THESE PROBLEMS MADE IT FOR YOU TO DO YOUR WORK, TAKE CARE OF THINGS AT HOME, OR GET ALONG WITH OTHER PEOPLE: SOMEWHAT DIFFICULT

## 2025-01-28 ASSESSMENT — PAIN SCALES - GENERAL: PAINLEVEL_OUTOF10: MODERATE PAIN (6)
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
## 2025-01-30 ENCOUNTER — TELEPHONE (OUTPATIENT)
Dept: TRANSPLANT | Facility: CLINIC | Age: 40
End: 2025-01-30
Payer: COMMERCIAL

## 2025-01-30 NOTE — TELEPHONE ENCOUNTER
Called Abiel to see if he has any questions before his evaluation next week.  He has no questions and we went over the importance for fasting with his labs and mixed meal test.

## 2025-02-05 ENCOUNTER — OFFICE VISIT (OUTPATIENT)
Dept: SURGERY | Facility: CLINIC | Age: 40
End: 2025-02-05
Payer: COMMERCIAL

## 2025-02-05 VITALS
SYSTOLIC BLOOD PRESSURE: 136 MMHG | HEART RATE: 72 BPM | DIASTOLIC BLOOD PRESSURE: 91 MMHG | OXYGEN SATURATION: 97 % | BODY MASS INDEX: 40.86 KG/M2 | HEIGHT: 72 IN | WEIGHT: 301.7 LBS

## 2025-02-05 DIAGNOSIS — K86.1 CHRONIC RECURRENT PANCREATITIS (H): Primary | ICD-10-CM

## 2025-02-05 DIAGNOSIS — E66.01 MORBID OBESITY WITH BMI OF 40.0-44.9, ADULT (H): ICD-10-CM

## 2025-02-05 DIAGNOSIS — K86.3 PANCREATIC PSEUDOCYST: ICD-10-CM

## 2025-02-05 DIAGNOSIS — E11.22 TYPE 2 DIABETES MELLITUS WITH CHRONIC KIDNEY DISEASE, WITHOUT LONG-TERM CURRENT USE OF INSULIN, UNSPECIFIED CKD STAGE (H): ICD-10-CM

## 2025-02-05 DIAGNOSIS — G89.4 CHRONIC PAIN DISORDER: Primary | ICD-10-CM

## 2025-02-05 ASSESSMENT — PAIN SCALES - GENERAL: PAINLEVEL_OUTOF10: MODERATE PAIN (4)

## 2025-02-05 NOTE — PROGRESS NOTES
"I met with Abiel Jason Weeks to discuss his chronic pancreatitis      Started in 2019 as Biliary pancreatitis.  He has had a cholecystectomy.  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    He is diabetic on metformin    Takes creon with meals and snacks. Has steatorhea without it.    Pregabalin, tylenol, medicinal marijuana (sometimes smoked or otherwise).  Not on narcotics (took some post op in July)    Not currently employed and lives with mother currently in [place].  Says he spends most of the day in a chair due to pain.    No family history of pancreatitis.  Younger brother with Crohn's disease.    PSH: cholecystectomy, pancreas endoscopic drainage procedures. No perc drains that he can recall      He describes having \"pancreas pain\"- typically everyday.  A gnawing pain associated with nausea.  Located in left upper abdomen and travels up to the back and neck sometimes.  Sometimes brought on by food, but not always.  Can wake up at night due to it.  Pain improved with ice packs.    After the stent was placed the pain did lessen somewhat for a few days/weeks    PE:  BP (!) 136/91 (BP Location: Left arm, Patient Position: Sitting, Cuff Size: Adult Large)   Pulse 72   Ht 1.829 m (6')   Wt (!) 136.9 kg (301 lb 11.2 oz)   SpO2 97%   BMI 40.92 kg/m        PE:  Alert, pleasant  RRR  No resp distress  No jaundice  Abd is obese, soft.  Stria evident.  Scars from lap tristan evident, no hernias appreciated.    I reviewed his CT scans from last year as well as some EGD notes.    A/P:  Daily pain (though not on narcotics) and what appears to be disconnected duct distally.  BMI of 41.  Not currently able to consider for TPIAT given his weight- there has been discussion of an endo-bariatric procedure and he is not eligible for the new weight loss drugs given his pancreatitis.    I do think he would be eligible for a distal pancreatectomy- this may achieve some amount of " pain relief given the disconnected duct.  I am curious about the feasibility of a sleeve gastrectomy performed concurrently with a distal pancreatectomy/splenectomy    I will refer him to our Bariatric clinic for evaluation of a combined procedure.    Time spent- 35 minutes (over 50% spent on counseling)

## 2025-02-05 NOTE — NURSING NOTE
Chief Complaint   Patient presents with    New Patient     TPIAT       Vitals:    02/05/25 0732   BP: (!) 136/91   BP Location: Left arm   Patient Position: Sitting   Cuff Size: Adult Large   Pulse: 72   SpO2: 97%   Weight: (!) 136.9 kg (301 lb 11.2 oz)   Height: 1.829 m (6')       Body mass index is 40.92 kg/m .                          Marky Conti, EMT

## 2025-02-05 NOTE — LETTER
"2/5/2025       RE: Abiel Weeks  [address]  [place] [address]     Dear Colleague,    Thank you for referring your patient, Abiel Weeks, to the [facility] [place] [facility]. Please see a copy of my visit note below.    I met with Abiel Weeks to discuss his chronic pancreatitis      Started in 2019 as Biliary pancreatitis.  He has had a cholecystectomy.  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    He is diabetic on metformin    Takes creon with meals and snacks. Has steatorhea without it.    Pregabalin, tylenol, medicinal marijuana (sometimes smoked or otherwise).  Not on narcotics (took some post op in July)    Not currently employed and lives with mother currently in [place].  Says he spends most of the day in a chair due to pain.    No family history of pancreatitis.  Younger brother with Crohn's disease.    PSH: cholecystectomy, pancreas endoscopic drainage procedures. No perc drains that he can recall      He describes having \"pancreas pain\"- typically everyday.  A gnawing pain associated with nausea.  Located in left upper abdomen and travels up to the back and neck sometimes.  Sometimes brought on by food, but not always.  Can wake up at night due to it.  Pain improved with ice packs.    After the stent was placed the pain did lessen somewhat for a few days/weeks    PE:  BP (!) 136/91 (BP Location: Left arm, Patient Position: Sitting, Cuff Size: Adult Large)   Pulse 72   Ht 1.829 m (6')   Wt (!) 136.9 kg (301 lb 11.2 oz)   SpO2 97%   BMI 40.92 kg/m        PE:  Alert, pleasant  RRR  No resp distress  No jaundice  Abd is obese, soft.  Stria evident.  Scars from lap tristan evident, no hernias appreciated.    I reviewed his CT scans from last year as well as some EGD notes.    A/P:  Daily pain (though not on narcotics) and what " appears to be disconnected duct distally.  BMI of 41.  Not currently able to consider for TPIAT given his weight- there has been discussion of an endo-bariatric procedure and he is not eligible for the new weight loss drugs given his pancreatitis.    I do think he would be eligible for a distal pancreatectomy- this may achieve some amount of pain relief given the disconnected duct.  I am curious about the feasibility of a sleeve gastrectomy performed concurrently with a distal pancreatectomy/splenectomy    I will refer him to our Bariatric clinic for evaluation of a combined procedure.    Time spent- 35 minutes (over 50% spent on counseling)        Again, thank you for allowing me to participate in the care of your patient.      Sincerely,    Liang Montes MD

## 2025-02-06 ENCOUNTER — LAB (OUTPATIENT)
Dept: LAB | Facility: CLINIC | Age: 40
End: 2025-02-06
Payer: COMMERCIAL

## 2025-02-06 ENCOUNTER — ALLIED HEALTH/NURSE VISIT (OUTPATIENT)
Dept: TRANSPLANT | Facility: CLINIC | Age: 40
End: 2025-02-06
Payer: COMMERCIAL

## 2025-02-06 ENCOUNTER — OFFICE VISIT (OUTPATIENT)
Dept: TRANSPLANT | Facility: CLINIC | Age: 40
End: 2025-02-06
Payer: COMMERCIAL

## 2025-02-06 VITALS
BODY MASS INDEX: 40.23 KG/M2 | WEIGHT: 297 LBS | DIASTOLIC BLOOD PRESSURE: 74 MMHG | TEMPERATURE: 98 F | RESPIRATION RATE: 16 BRPM | OXYGEN SATURATION: 95 % | HEIGHT: 72 IN | SYSTOLIC BLOOD PRESSURE: 124 MMHG | HEART RATE: 68 BPM

## 2025-02-06 VITALS — BODY MASS INDEX: 40.42 KG/M2 | WEIGHT: 298.06 LBS

## 2025-02-06 DIAGNOSIS — E11.22 TYPE 2 DIABETES MELLITUS WITH CHRONIC KIDNEY DISEASE, WITHOUT LONG-TERM CURRENT USE OF INSULIN, UNSPECIFIED CKD STAGE (H): ICD-10-CM

## 2025-02-06 DIAGNOSIS — K86.1 CHRONIC RECURRENT PANCREATITIS (H): ICD-10-CM

## 2025-02-06 DIAGNOSIS — G89.4 CHRONIC PAIN DISORDER: ICD-10-CM

## 2025-02-06 DIAGNOSIS — E21.3 HYPERPARATHYROIDISM: ICD-10-CM

## 2025-02-06 DIAGNOSIS — K86.3 PANCREATIC PSEUDOCYST: ICD-10-CM

## 2025-02-06 DIAGNOSIS — I42.8 NONISCHEMIC CARDIOMYOPATHY (H): ICD-10-CM

## 2025-02-06 DIAGNOSIS — E11.22 TYPE 2 DIABETES MELLITUS WITH CHRONIC KIDNEY DISEASE, WITHOUT LONG-TERM CURRENT USE OF INSULIN, UNSPECIFIED CKD STAGE (H): Primary | ICD-10-CM

## 2025-02-06 DIAGNOSIS — K86.1 CHRONIC RECURRENT PANCREATITIS (H): Primary | ICD-10-CM

## 2025-02-06 LAB
ALBUMIN SERPL BCG-MCNC: 4.4 G/DL (ref 3.5–5.2)
ALP SERPL-CCNC: 78 U/L (ref 40–150)
ALT SERPL W P-5'-P-CCNC: 12 U/L (ref 0–70)
AMYLASE SERPL-CCNC: 90 U/L (ref 28–100)
ANION GAP SERPL CALCULATED.3IONS-SCNC: 13 MMOL/L (ref 7–15)
AST SERPL W P-5'-P-CCNC: 20 U/L (ref 0–45)
BASOPHILS # BLD AUTO: 0.1 10E3/UL (ref 0–0.2)
BASOPHILS NFR BLD AUTO: 1 %
BILIRUB SERPL-MCNC: 0.4 MG/DL
BUN SERPL-MCNC: 17.8 MG/DL (ref 6–20)
C PEPTIDE SERPL-MCNC: 2.2 NG/ML (ref 0.9–6.9)
C PEPTIDE SERPL-MCNC: 6 NG/ML (ref 0.9–6.9)
CALCIUM SERPL-MCNC: 9.2 MG/DL (ref 8.8–10.4)
CEA SERPL-MCNC: 1.1 NG/ML
CHLORIDE SERPL-SCNC: 101 MMOL/L (ref 98–107)
CHOLEST SERPL-MCNC: 169 MG/DL
CREAT SERPL-MCNC: 1.06 MG/DL (ref 0.67–1.17)
EGFRCR SERPLBLD CKD-EPI 2021: 90 ML/MIN/1.73M2
EOSINOPHIL # BLD AUTO: 0.2 10E3/UL (ref 0–0.7)
EOSINOPHIL NFR BLD AUTO: 2 %
ERYTHROCYTE [DISTWIDTH] IN BLOOD BY AUTOMATED COUNT: 13.2 % (ref 10–15)
EST. AVERAGE GLUCOSE BLD GHB EST-MCNC: 131 MG/DL
FASTING STATUS PATIENT QL REPORTED: NO
FASTING STATUS PATIENT QL REPORTED: NO
FASTING STATUS PATIENT QL REPORTED: YES
FERRITIN SERPL-MCNC: 111 NG/ML (ref 31–409)
GLUCOSE SERPL-MCNC: 122 MG/DL (ref 70–99)
GLUCOSE SERPL-MCNC: 125 MG/DL (ref 70–99)
GLUCOSE SERPL-MCNC: 184 MG/DL (ref 70–99)
HBA1C MFR BLD: 6.2 %
HCO3 SERPL-SCNC: 24 MMOL/L (ref 22–29)
HCT VFR BLD AUTO: 42 % (ref 40–53)
HCV AB SERPL QL IA: NONREACTIVE
HDLC SERPL-MCNC: 42 MG/DL
HGB BLD-MCNC: 13.9 G/DL (ref 13.3–17.7)
IMM GRANULOCYTES # BLD: 0 10E3/UL
IMM GRANULOCYTES NFR BLD: 0 %
IRON BINDING CAPACITY (ROCHE): 376 UG/DL (ref 240–430)
IRON SATN MFR SERPL: 17 % (ref 15–46)
IRON SERPL-MCNC: 64 UG/DL (ref 61–157)
LDLC SERPL CALC-MCNC: 60 MG/DL
LIPASE SERPL-CCNC: 32 U/L (ref 13–60)
LYMPHOCYTES # BLD AUTO: 2 10E3/UL (ref 0.8–5.3)
LYMPHOCYTES NFR BLD AUTO: 23 %
MCH RBC QN AUTO: 29 PG (ref 26.5–33)
MCHC RBC AUTO-ENTMCNC: 33.1 G/DL (ref 31.5–36.5)
MCV RBC AUTO: 88 FL (ref 78–100)
MONOCYTES # BLD AUTO: 0.6 10E3/UL (ref 0–1.3)
MONOCYTES NFR BLD AUTO: 7 %
NEUTROPHILS # BLD AUTO: 6 10E3/UL (ref 1.6–8.3)
NEUTROPHILS NFR BLD AUTO: 67 %
NONHDLC SERPL-MCNC: 127 MG/DL
NRBC # BLD AUTO: 0 10E3/UL
NRBC BLD AUTO-RTO: 0 /100
PLATELET # BLD AUTO: 210 10E3/UL (ref 150–450)
POTASSIUM SERPL-SCNC: 3.8 MMOL/L (ref 3.4–5.3)
PROT SERPL-MCNC: 7.3 G/DL (ref 6.4–8.3)
RBC # BLD AUTO: 4.8 10E6/UL (ref 4.4–5.9)
SODIUM SERPL-SCNC: 138 MMOL/L (ref 135–145)
TRIGL SERPL-MCNC: 335 MG/DL
WBC # BLD AUTO: 8.9 10E3/UL (ref 4–11)

## 2025-02-06 PROCEDURE — 80053 COMPREHEN METABOLIC PANEL: CPT | Performed by: PATHOLOGY

## 2025-02-06 PROCEDURE — 36415 COLL VENOUS BLD VENIPUNCTURE: CPT | Mod: 90 | Performed by: PATHOLOGY

## 2025-02-06 PROCEDURE — 82150 ASSAY OF AMYLASE: CPT | Performed by: PATHOLOGY

## 2025-02-06 PROCEDURE — 82306 VITAMIN D 25 HYDROXY: CPT | Performed by: PEDIATRICS

## 2025-02-06 PROCEDURE — 86803 HEPATITIS C AB TEST: CPT | Performed by: PEDIATRICS

## 2025-02-06 PROCEDURE — 83540 ASSAY OF IRON: CPT | Performed by: PATHOLOGY

## 2025-02-06 PROCEDURE — 83550 IRON BINDING TEST: CPT | Performed by: PATHOLOGY

## 2025-02-06 PROCEDURE — 82378 CARCINOEMBRYONIC ANTIGEN: CPT | Performed by: PEDIATRICS

## 2025-02-06 PROCEDURE — 86341 ISLET CELL ANTIBODY: CPT | Mod: 90 | Performed by: PATHOLOGY

## 2025-02-06 PROCEDURE — 86337 INSULIN ANTIBODIES: CPT | Mod: 90 | Performed by: PATHOLOGY

## 2025-02-06 PROCEDURE — 84590 ASSAY OF VITAMIN A: CPT | Mod: 90 | Performed by: PATHOLOGY

## 2025-02-06 PROCEDURE — 86301 IMMUNOASSAY TUMOR CA 19-9: CPT | Mod: 90 | Performed by: PATHOLOGY

## 2025-02-06 PROCEDURE — 85025 COMPLETE CBC W/AUTO DIFF WBC: CPT | Performed by: PATHOLOGY

## 2025-02-06 PROCEDURE — 84681 ASSAY OF C-PEPTIDE: CPT | Performed by: PEDIATRICS

## 2025-02-06 PROCEDURE — 83690 ASSAY OF LIPASE: CPT | Performed by: PATHOLOGY

## 2025-02-06 PROCEDURE — G0463 HOSPITAL OUTPT CLINIC VISIT: HCPCS | Performed by: PEDIATRICS

## 2025-02-06 PROCEDURE — 82728 ASSAY OF FERRITIN: CPT | Performed by: PATHOLOGY

## 2025-02-06 PROCEDURE — G0480 DRUG TEST DEF 1-7 CLASSES: HCPCS | Mod: 90 | Performed by: PATHOLOGY

## 2025-02-06 PROCEDURE — 99000 SPECIMEN HANDLING OFFICE-LAB: CPT | Performed by: PATHOLOGY

## 2025-02-06 PROCEDURE — 83036 HEMOGLOBIN GLYCOSYLATED A1C: CPT | Performed by: PEDIATRICS

## 2025-02-06 PROCEDURE — 80061 LIPID PANEL: CPT | Performed by: PATHOLOGY

## 2025-02-06 PROCEDURE — 84446 ASSAY OF VITAMIN E: CPT | Mod: 90 | Performed by: PATHOLOGY

## 2025-02-06 RX ORDER — ACYCLOVIR 400 MG/1
TABLET ORAL
Qty: 3 EACH | Refills: 5 | Status: SHIPPED | OUTPATIENT
Start: 2025-02-06

## 2025-02-06 NOTE — PROGRESS NOTES
Pediatric Endocrinology/ Islet Autotransplant  Chronic Pancreatitis Consult    Patient Active Problem List   Diagnosis    Chronic pain disorder    Chronic recurrent pancreatitis (H)    Elevated transaminase level    Essential hypertension    NOAH (generalized anxiety disorder)    Hyperparathyroidism    Moderate episode of recurrent major depressive disorder (H)    Nonischemic cardiomyopathy (H)    KAYLA (obstructive sleep apnea)    Pancreatic pseudocyst    Type 2 diabetes mellitus with chronic kidney disease, without long-term current use of insulin, unspecified CKD stage (H)    Alcohol use disorder, severe, in sustained remission (H)       Assessment/Plan:  1.  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  Diabetes mellitus, type 2 vs type 3c      Abiel is a 39 year old with pancreatitis, considering surgical management.  His case is complicated by disconnected duct, morbid obesity (BMI >40), and diabetes that occurred after AP onset but with features likely of type 2 given his weight.      We met today to review two surgical options:  TPIAT or distal pancreatectomy with or without islets.  He is not a TPIAT candidate unless he loses considerable weight as he has a high risk for surgical failure and also risk of the islet infusion into a fatty liver with his current obesity.  Distal may or may not be an option; ideally I'd want to do IAT with a distal but that may not be an option if he has significant liver adiposity.  We will need to discuss next steps with the team.  He and I discussed that no matter which surgical option is pursued, I do expect him to be on some insulin lifelong after any pancreas surgery, given that he already has diabetes, and the goal would be to keep some islet function and keep near normal glucoses control and keep diabetes easier to manage.  We also talked about realistic expectations of pain improvement but not pain remission, and his goal is really  just to get back to a functional state.      All surgical consults are reviewed by our multi-disciplinary team to determine if surgery is an appropriate next option.  FAUSTO Virgen MD  [facility]  Phone:  [phone]  Fax:  [phone]          CC:  Chronic pancreatitis, surgical evaluation    HPI:  Abiel Weeks is a 39 year old male referred by Dr. Nelson Lay for new patient consultation of endocrine function in the setting of chronic pancreatitis.    Pancreatitis history is as follows:  Abiel has a complicated history of necrotizing AP due to gallstones in 2019 with subsequent recurrent AP and progressive pain since, especially over the past 2.5 years.  He has a disconnected duct and has had prior ERCP directed at disconnected duct here.  Over the past couple of years he has had disabling pain, is out of work, living with his mom, and mostly sedentary from a physical activity perspective and gaining weight.  He is working with a pain clinic and has also discussed spinal cord stimulator options (not yet approved by his insurance for coverage of stimulator however).  Dr. Montes plans to also refer to bariatric surgery for weight loss options.    From Dr. Lay's note, additional details on intervention:   recurrent acute pancreatitis c/b stricture in the partially atrophied body/tail with upstream pseudocyst and suspected disconnected duct. He was recently seen by Dr. Goodwin and underwent EUS with transluminal drainage of [id number] pseudocyst in the pancreatic tail through placement of LAMS and double pigtailed plastic stents and subsequent removal of LAMS with maintained plastic stents. He now reports that his chronic left sided / epigastric pain was initially improved somewhat after his most recent cystgastrostomy however pain has returned to largely same quality / severity as pre-procedure. His  post-intervention CT shows appropriately placed plastic stents without complication and no identifiable target for further endoscopic intervention.     Evaluation/ imaging/ treatments:  Elevated amylase and lipase by history:  yes  Etiology of disease: gallstone with ductal injury/obstruction  Recent imaging studies:   CT 8.8 .24  Pancreas: Mild fatty infiltration of the pancreatic head. No  peripancreatic inflammatory stranding. Pigtail stent tube visualized  with the proximal loop in the gastric lumen, distal loop within the  pancreatic duct at the tail. No focal fluid collections or  calcifications along the length of the pancreas.  5/3/24 MRIMRCP  1. Slight interval enlargement of pseudocyst in the pancreatic tail  region measuring up to 3.1 cm, previously 2.5 cm.   2. Moderate atrophy of the distal pancreatic body and tail with few  dilated sidebranches in the tail region. Main pancreatic duct is  non-dilated.   Medical treatment(s): pain management clinic  ERCP procedures:Yes, as noted above and in Epic.   Prior pancreatic surgery: no  Cholecystectomy: yes  No nerve block    Endocrine history:  Diabetes diagnosed in 2023 with A1c in 8% range.  On metformin 1000 mg BID with good control, and also used Dexcom in past.  He does recall insulin use in hospital only.         Review of Systems:  A comprehensive 10 point review of systems was performed and was negative except as noted in the HPI above.    Past Medical History:  Past Medical History:   Diagnosis Date    Chronic kidney disease     Chronic recurrent pancreatitis (H)     Diabetes (H)     DM2 (diabetes mellitus, type 2) (H)     NOAH (generalized anxiety disorder)     Gastroesophageal reflux disease     Hyperparathyroidism     Hypertension     MDD (major depressive disorder)     Nonischemic cardiomyopathy (H)     Obese     KAYLA (obstructive sleep apnea)     Other chronic pain     PTSD (post-traumatic stress disorder)     Seasonal allergic rhinitis     Sleep  apnea      Past Surgical History:   Procedure Laterality Date    ENDOSCOPIC ULTRASOUND UPPER GASTROINTESTINAL TRACT (GI) N/A 7/18/2024    Procedure: ENDOSCOPIC ULTRASOUND, ESOPHAGOSCOPY / UPPER GASTROINTESTINAL TRACT (GI) with cystgastrostomy;  Surgeon: Murtaza Villafuerte MD;  Location: [facility] OR    ESOPHAGOSCOPY, GASTROSCOPY, DUODENOSCOPY (EGD), COMBINED N/A 7/25/2024    Procedure: ESOPHAGOGASTRODUODENOSCOPY with flouroscopy with Axios stent removal;  Surgeon: Murtaza Villafuerte MD;  Location: [facility] OR       Current medications:  Current Outpatient Medications   Medication Sig Dispense Refill    acetaminophen (TYLENOL) 500 MG tablet Take 1,000 mg by mouth every 8 hours as needed      albuterol (PROAIR HFA/PROVENTIL HFA/VENTOLIN HFA) 108 (90 Base) MCG/ACT inhaler Inhale 2 puffs into the lungs every 4 hours as needed for shortness of breath, wheezing or cough      alum & mag hydroxide-simethicone (CONSTANTINO-LANTA) 200-200-20 MG/5ML SUSP suspension Take 15 mLs by mouth every 4 hours as needed.      amitriptyline (ELAVIL) 25 MG tablet Take 75 mg by mouth at bedtime.      amLODIPine (NORVASC) 10 MG tablet Take 1 tablet by mouth daily      carvedilol (COREG) 25 MG tablet Take 50 mg by mouth 2 times daily (with meals)      Continuous Glucose Sensor (DEXCOM G7 SENSOR) MISC Change every 10 days. 3 each 5    DULoxetine (CYMBALTA) 60 MG capsule Take 60 mg by mouth daily      hydrALAZINE (APRESOLINE) 100 MG tablet Take 100 mg by mouth 3 times daily      isosorbide mononitrate (IMDUR) 30 MG 24 hr tablet Take 90 mg by mouth at bedtime      lipase-protease-amylase (CREON) 37401-09123-906061 units CPEP per EC capsule Take 2-3 with meals / 1-2 with snacks, up to 15 per day. 450 capsule 6    medical cannabis (Patient's own supply) See Admin Instructions. (The purpose of this order is to document that the patient reports taking medical cannabis.  This is not a prescription, and is not used to certify that the patient has a qualifying  "medical condition.)      metFORMIN (GLUCOPHAGE) 1000 MG tablet Take 1,000 mg by mouth 2 times daily (with meals)      Multiple Vitamins-Minerals (SUPER THERA TOÑO M) TABS Take 1 tablet by mouth daily      ondansetron (ZOFRAN ODT) 4 MG ODT tab Place 4 mg under the tongue every 8 hours as needed      pantoprazole (PROTONIX) 40 MG EC tablet Take 40 mg by mouth 2 times daily (with meals)      polyethylene glycol (MIRALAX) 17 g packet Take 17 g by mouth daily as needed      pregabalin (LYRICA) 150 MG capsule Take 1 capsule by mouth 3 times daily      Continuous Glucose Sensor (DEXCOM G6 SENSOR) MISC  (Patient not taking: Reported on 2/6/2025)      Continuous Glucose Transmitter (DEXCOM G6 TRANSMITTER) MISC CHANGE EVERY 90 DAYS AS DIRECTED (Patient not taking: Reported on 1/28/2025)      methylPREDNISolone (MEDROL) 32 MG tablet Take 1 tablet (32 mg) by mouth 2 times daily as needed (for contrast allergy, take 1 tablet 12 hours prior to radiology scan, and 1 tablet 2 hours prior to radiology scan). (Patient not taking: Reported on 1/28/2025) 2 tablet 0    oxyCODONE (ROXICODONE) 5 MG tablet Take 1 tablet (5 mg) by mouth every 6 hours as needed for severe pain (Patient not taking: Reported on 2/6/2025) 9 tablet 0       Family History:  The family history was reviewed with particular attention to diabetes and pancreatitis history, and updated by me as pertinent, and is as documented below.    Family History   Problem Relation Age of Onset    Substance Abuse Father     Substance Abuse Paternal Grandfather     Depression Brother     Depression Brother     Schizophrenia Paternal Uncle        Social History:  Social History     Social History Narrative    Not on file       Physical Exam:  Vitals: /74 (BP Location: Right arm, Patient Position: Chair, Cuff Size: Adult Regular)   Pulse 68   Temp 98  F (36.7  C) (Oral)   Resp 16   Ht 1.82 m (5' 11.65\")   Wt 134.7 kg (297 lb)   SpO2 95%   BMI 40.67 kg/m    BMI= Body mass " "index is 40.67 kg/m .  General:    General:  Appearance is normal, no acute distress  HEENT:  NC/AT, sclera appear white  Neck:  No obvious thyromegaly  CV/Lungs:  Non distressed breathing  Skin:  No apparent rashes  Neuro:  Normal mental status  Psych:  Normal affect      Results:  Hemoglobin A1c levels (this and prior visits):  Lab Results   Component Value Date    A1C 6.2 02/06/2025       Mixed meal test results (from [facility] ):  C Peptide   Date Value Ref Range Status   02/06/2025 2.2 0.9 - 6.9 ng/mL Final     Comment:     Reference range applies to fasting specimens.     Glucose   Date Value Ref Range Status   02/06/2025 122 (H) 70 - 99 mg/dL Final   02/06/2025 184 (H) 70 - 99 mg/dL Final   02/06/2025 125 (H) 70 - 99 mg/dL Final   07/18/2024 129 (H) 70 - 99 mg/dL Final     GLUCOSE BY METER POCT   Date Value Ref Range Status   07/25/2024 114 (H) 70 - 99 mg/dL Final   07/18/2024 121 (H) 70 - 99 mg/dL Final   07/18/2024 137 (H) 70 - 99 mg/dL Final         Liver enzymes:  AST   Date Value Ref Range Status   02/06/2025 20 0 - 45 U/L Final     ALT   Date Value Ref Range Status   02/06/2025 12 0 - 70 U/L Final     No results found for: \"BILICONJ\"   Bilirubin Total   Date Value Ref Range Status   02/06/2025 0.4 <=1.2 mg/dL Final     Albumin   Date Value Ref Range Status   02/06/2025 4.4 3.5 - 5.2 g/dL Final     Protein Total   Date Value Ref Range Status   02/06/2025 7.3 6.4 - 8.3 g/dL Final      Alkaline Phosphatase   Date Value Ref Range Status   02/06/2025 78 40 - 150 U/L Final       Creatinine:  Creatinine   Date Value Ref Range Status   02/06/2025 1.06 0.67 - 1.17 mg/dL Final   ]    Complete Blood Count:  CBC RESULTS:   Recent Labs   Lab Test 02/06/25  0825   WBC 8.9   RBC 4.80   HGB 13.9   HCT 42.0   MCV 88   MCH 29.0   MCHC 33.1   RDW 13.2                "

## 2025-02-06 NOTE — NURSING NOTE
"Chief Complaint   Patient presents with    Follow Up     New auto islet     Vital signs:  Temp: 98  F (36.7  C) Temp src: Oral BP: 124/74 Pulse: 68   Resp: 16 SpO2: 95 %     Height: 182 cm (5' 11.65\") Weight: 134.7 kg (297 lb)  Estimated body mass index is 40.67 kg/m  as calculated from the following:    Height as of this encounter: 1.82 m (5' 11.65\").    Weight as of this encounter: 134.7 kg (297 lb).      Prudencio Briscoe RN on 2/6/2025 at 11:55 AM      .  "

## 2025-02-06 NOTE — NURSING NOTE
Administered [facility]    FBS: 125lb    Weight (kg): 135.2kg    Called lab with boost time : Yes    Lab times: 10:09 am, 11:09 am    Patient is aware and given time to return to lab: Yes    Rachael Combs CMA   2/6/2025 9:13 AM

## 2025-02-07 LAB
C PEPTIDE SERPL-MCNC: 6.3 NG/ML (ref 0.9–6.9)
FASTING STATUS PATIENT QL REPORTED: YES

## 2025-02-08 LAB
CANCER AG19-9 SERPL IA-ACNC: 14 U/ML
LABORATORY REPORT: NORMAL
PETH INTERPRETATION: NORMAL
PLPETH BLD-MCNC: <10 NG/ML
POPETH BLD-MCNC: <10 NG/ML

## 2025-02-09 LAB
A-TOCOPHEROL VIT E SERPL-MCNC: 12.9 MG/L
ANABASINE UR-MCNC: <5 NG/ML
ANNOTATION COMMENT IMP: NORMAL
BETA+GAMMA TOCOPHEROL SERPL-MCNC: 0.9 MG/L
COTININE UR-MCNC: <15 NG/ML
DEPRECATED CALCIDIOL+CALCIFEROL SERPL-MC: <30 UG/L (ref 20–75)
GAD65 AB SER IA-ACNC: <5 IU/ML
INSULIN AB SER IA-ACNC: <0.4 U/ML
NICOTINE UR-MCNC: <15 NG/ML
PANC ISLET CELL AB TITR SER: NORMAL {TITER}
RETINYL PALMITATE SERPL-MCNC: 0.03 MG/L
TRANS-3-OH-COTININE UR-MCNC: <50 NG/ML
VIT A SERPL-MCNC: 1.13 MG/L
VITAMIN D2 SERPL-MCNC: <5 UG/L
VITAMIN D3 SERPL-MCNC: 25 UG/L

## 2025-02-20 ENCOUNTER — VIRTUAL VISIT (OUTPATIENT)
Dept: ENDOCRINOLOGY | Facility: CLINIC | Age: 40
End: 2025-02-20
Payer: COMMERCIAL

## 2025-02-20 VITALS — HEIGHT: 72 IN | BODY MASS INDEX: 40.23 KG/M2 | WEIGHT: 297 LBS

## 2025-02-20 DIAGNOSIS — E66.01 MORBID OBESITY WITH BMI OF 40.0-44.9, ADULT (H): ICD-10-CM

## 2025-02-20 DIAGNOSIS — E11.22 TYPE 2 DIABETES MELLITUS WITH CHRONIC KIDNEY DISEASE, WITHOUT LONG-TERM CURRENT USE OF INSULIN, UNSPECIFIED CKD STAGE (H): ICD-10-CM

## 2025-02-20 DIAGNOSIS — G89.4 CHRONIC PAIN DISORDER: ICD-10-CM

## 2025-02-20 DIAGNOSIS — K86.3 PANCREATIC PSEUDOCYST: ICD-10-CM

## 2025-02-20 PROCEDURE — 98002 SYNCH AUDIO-VIDEO NEW MOD 45: CPT | Performed by: PHYSICIAN ASSISTANT

## 2025-02-20 PROCEDURE — 1125F AMNT PAIN NOTED PAIN PRSNT: CPT | Mod: 95 | Performed by: PHYSICIAN ASSISTANT

## 2025-02-20 RX ORDER — TOPIRAMATE 25 MG/1
TABLET, FILM COATED ORAL
Qty: 90 TABLET | Refills: 3 | Status: SHIPPED | OUTPATIENT
Start: 2025-02-20

## 2025-02-20 ASSESSMENT — PAIN SCALES - GENERAL: PAINLEVEL_OUTOF10: MODERATE PAIN (4)

## 2025-02-20 NOTE — LETTER
"2025       RE: Abiel Weeks  [address]  [place] [address]     Dear Colleague,    Thank you for referring your patient, Abiel Weeks, to the [facility]. Please see a copy of my visit note below.    New Bariatric Surgery Consultation Note    2025    RE: Abiel Weeks  MR#: [id number]  : 1985      Referring provider:       2025    10:22 PM   --   Who referred you Dr. Montes       Chief Complaint/Reason for visit: evaluation for possible weight loss surgery    Dear Brandon Garcia MD (General),    I had the pleasure of seeing your patient, Abiel Weeks, to evaluate his obesity and consider him for possible weight loss surgery.  As you know, Abiel Weeks is 39 year old.  He has a height of 6' 0\", a weight of 297 lbs 0 oz, and calculated Body mass index is 40.28 kg/m ..  Full intake/assessment was done to determine barriers to weight loss success and develop a treatment plan.  Lost 160 lbs when first getting sick with pancreatitis [id number].  Gained 30 lbs in the last 6 months.     Assessment & Plan  Problem List Items Addressed This Visit          Endocrine Diagnoses    Pancreatic pseudocyst    Type 2 diabetes mellitus with chronic kidney disease, without long-term current use of insulin, unspecified CKD stage (H)       Other    Chronic pain disorder     Other Visit Diagnoses       Morbid obesity with BMI of 40.0-44.9, adult (H)        Relevant Medications    topiramate (TOPAMAX) 25 MG tablet    Other Relevant Orders    Lipid panel reflex to direct LDL Fasting    Vitamin D Deficiency    Vitamin B12    Parathyroid Hormone Intact           39 y.o. 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 Takes " metformin    [facility] Pain managing chronic abdominal pain  Seen by Dr Virgen (endo), Dr Montes (surgery), Dr Lay (GI). Will message team and Dr Stroud/Dr Campos to discuss plan.  Discussed surgical and medical weight mgmt options  Not a candidate for GLP1 due hx of pancreatitis.    Consider topiramate. No hx of kidney stones or glaucoma. Rare migraine headaches.   Start topiramate to 75mg     Follow up:  RD  Dr Stroud to discuss ESG vs LSG.  ESG may not be sufficient to get to weight loss goal of BMI 30.      HISTORY OF PRESENT ILLNESS:      2/19/2025    10:22 PM   Weight Loss History Reviewed with Patient   How long have you been overweight? Since late 20's to early 40's   What is the most that you have ever weighed 425   What is the most weight you have lost? 160   I have tried the following methods to lose weight Watching portions or calories    Physician directed program   I have tried the following weight loss medications? (Check all that apply) None       CO-MORBIDITIES OF OBESITY INCLUDE:      2/19/2025    10:22 PM   --   I have the following health issues associated with obesity Type II Diabetes    Heart Disease    High Blood Pressure    Sleep Apnea doesn't use CPAP doesn't tolerate    Sleep study 4/17 2019 - obstructive sleep apnea and few central/mixed apneas. CPAP recommended.      GERD (Reflux)         PAST MEDICAL HISTORY:  Past Medical History:   Diagnosis Date     Chronic kidney disease      Chronic recurrent pancreatitis (H)      Depressive disorder 1994    Have had it since I was young.     Diabetes (H)      DM2 (diabetes mellitus, type 2) (H)      NOAH (generalized anxiety disorder)      Gastroesophageal reflux disease      History of diabetes mellitus 2022    Type 2     Hyperparathyroidism      Hypertension      Kidney failure 2019    Stage 3 but no dialysis.     MDD (major depressive disorder)      Nonischemic cardiomyopathy (H)      Obese      KAYLA (obstructive sleep apnea)      Other  chronic pain      Other mental problems 2021    PTSD     PTSD (post-traumatic stress disorder)      Seasonal allergic rhinitis      Sleep apnea      Stomach problems 2019    Pancreatitis (Recurrent)       PAST SURGICAL HISTORY:  Past Surgical History:   Procedure Laterality Date     ABDOMEN SURGERY  2024    Lala had several all endoscopic     CHOLECYSTECTOMY       ENDOSCOPIC ULTRASOUND UPPER GASTROINTESTINAL TRACT (GI) N/A 07/18/2024    Procedure: ENDOSCOPIC ULTRASOUND, ESOPHAGOSCOPY / UPPER GASTROINTESTINAL TRACT (GI) with cystgastrostomy;  Surgeon: Murtaza Villafuerte MD;  Location: [facility] OR     ESOPHAGOSCOPY, GASTROSCOPY, DUODENOSCOPY (EGD), COMBINED N/A 07/25/2024    Procedure: ESOPHAGOGASTRODUODENOSCOPY with flouroscopy with Axios stent removal;  Surgeon: Murtaza Villafuerte MD;  Location: [facility] OR       FAMILY HISTORY:   Family History   Problem Relation Age of Onset     Diabetes Mother      Breast Cancer Mother      Substance Abuse Father      Substance Abuse Paternal Grandfather      Crohn's Disease Brother      Depression Brother      Depression Brother      Schizophrenia Paternal Uncle        SOCIAL HISTORY:       2/19/2025    10:22 PM   Social History Questions Reviewed With Patient   Which best describes your employment status (select all that apply) I am unemployed   Which best describes your marital status single   Who do you have in your support network that can be available to help you for the first 2 weeks after surgery? My mother.   Who can you count on for support throughout your weight loss surgery journey? My mother, and the rest of my family.     Quit drinking completely in 2018 when getting sick  Alcohol abuse after divorce at age 24 for 4-5 years    HABITS:      2/19/2025    10:22 PM   --   How often do you drink alcohol? Never   Do you currently use any of the following Nicotine products? No   Have you ever used any of the following nicotine products? Cigars   If you previously used any of  these products, what year did you quit? 2018   Have you or are you currently using street drugs or prescription strength medication for which you do not have a prescription for? No   Do you have a history of chemical dependency (alcohol or drug abuse)? Yes     Currently taking narcotic/opioids No    PSYCHOLOGICAL HISTORY:       2/19/2025    10:22 PM   Psychological History Reviewed With Patient   Have you ever attempted suicide? More than 10 years ago.   Have you had thoughts of suicide in the past year? Yes   Have you ever been hospitalized for mental illness or a suicide attempt? Never.   Do you have a history of chronic pain? Yes   Have you ever been diagnosed with fibromyalgia? No   Are you currently being treated for any of the following? (select all that apply) Depression    Anxiety    Post traumatic stress disorder   Are you currently seeing a therapist or counselor? Yes   Are you currently seeing a psychiatrist? No       ROS:      2/19/2025    10:22 PM   --   Skin None of the above   HEENT Missing teeth   If you answered yes to missing teeth, please indicate how many 8   Musculoskeletal None of the above   Cardiovascular None of the above   Pulmonary Snoring   Gastrointestinal Heartburn    Reflux    Constipation   Genitourinary None of the above   Hematological None of the above   Neurological None of the above       EATING BEHAVIORS:      2/19/2025    10:22 PM   --   Have you or anyone else thought that you had an eating disorder? Yes   If you answered yes to the previous eating disorder question, select the types that apply from this list Binge Eating   Do you currently binge eat (eat a large amount of food in a short time)? Yes   Are you an emotional eater? Yes   Do you get up to eat after falling asleep? No   Can you afford 3 meals a day? Yes   Can you afford 50-60 dollars a month for vitamins? No       EXERCISE:      2/19/2025    10:22 PM   --   How often do you exercise? Less than 1 time per week    What is the duration of your exercise (in minutes)? 10 Minutes   What types of exercise do you do? walking   What keeps you from being more active? I am as active as I can possbily be    Pain       MEDICATIONS:  Current Outpatient Medications   Medication Sig Dispense Refill     topiramate (TOPAMAX) 25 MG tablet 25mg at bedtime for week 1, 50mg at bedtime for 1 week, and 75mg at bedtime thereafter 90 tablet 3     acetaminophen (TYLENOL) 500 MG tablet Take 1,000 mg by mouth every 8 hours as needed       albuterol (PROAIR HFA/PROVENTIL HFA/VENTOLIN HFA) 108 (90 Base) MCG/ACT inhaler Inhale 2 puffs into the lungs every 4 hours as needed for shortness of breath, wheezing or cough       alum & mag hydroxide-simethicone (CONSTANTINO-LANTA) 200-200-20 MG/5ML SUSP suspension Take 15 mLs by mouth every 4 hours as needed.       amitriptyline (ELAVIL) 25 MG tablet Take 75 mg by mouth at bedtime.       amLODIPine (NORVASC) 10 MG tablet Take 1 tablet by mouth daily       carvedilol (COREG) 25 MG tablet Take 50 mg by mouth 2 times daily (with meals)       Continuous Glucose Sensor (DEXCOM G7 SENSOR) MISC Change every 10 days. 3 each 5     DULoxetine (CYMBALTA) 60 MG capsule Take 60 mg by mouth daily       hydrALAZINE (APRESOLINE) 100 MG tablet Take 100 mg by mouth 3 times daily       isosorbide mononitrate (IMDUR) 30 MG 24 hr tablet Take 90 mg by mouth at bedtime       lipase-protease-amylase (CREON) 06775-32453-791489 units CPEP per EC capsule Take 2-3 with meals / 1-2 with snacks, up to 15 per day. 450 capsule 6     medical cannabis (Patient's own supply) See Admin Instructions. (The purpose of this order is to document that the patient reports taking medical cannabis.  This is not a prescription, and is not used to certify that the patient has a qualifying medical condition.)       metFORMIN (GLUCOPHAGE) 1000 MG tablet Take 1,000 mg by mouth 2 times daily (with meals)       Multiple Vitamins-Minerals (SUPER THERA TOÑO M) TABS Take  1 tablet by mouth daily       ondansetron (ZOFRAN ODT) 4 MG ODT tab Place 4 mg under the tongue every 8 hours as needed       pantoprazole (PROTONIX) 40 MG EC tablet Take 40 mg by mouth 2 times daily (with meals)       polyethylene glycol (MIRALAX) 17 g packet Take 17 g by mouth daily as needed       pregabalin (LYRICA) 150 MG capsule Take 1 capsule by mouth 3 times daily         Is patient on biologics or immunomodulators? No    ALLERGIES:  Allergies   Allergen Reactions     Iodine Hives, Itching and Rash     Pt allergic to iodine/ shellfish , contrast dyes.     Pt allergic to iodine/ shellfish , contrast dyes.     Trazodone Other (See Comments)     priapsim     Amoxicillin      Patient does not what type of reaction/happened as a child     Citrus Nausea     Methylcellulose      Shellfish-Derived Products Nausea and Nausea and Vomiting     Other Reaction(s): GI Upset, Unknown     EGD 7/25/24:    Findings:       The Z-line was regular and was found 45 cm from the incisors.        The examined duodenum was normal.        Two stents, one lumen opposing metal stent (LAMS) and a pigtailed        plastic stent, were found on the lesser curvature of the gastric body.        The stent were in place and draining pus/purulent output. A two channel        therapeutic scope was used with two rat-toothed forceps under        fluoroscopy guidance. The LAMS (Axios) was successfully removed while        keeping the double pgitailed plastic stent in place (within the        pseudocyst). The position of the double pigtailed plastic stent was        confirmed using fluoroscopy.                                                                                    Impression:            - EGD with fluoroscopy                          - Removal of LAMS (Axios) while keeping the plastic                          stent in place   Recommendation:        - Observe patient in same day observation unit for                          possible  discharge same day.                          - Resume previous diet.                          - Follow with Dr. Lay or Dr. Villafuerte in                          GI/pancreatic clinic prn if recurrent symptoms occur.                          The plan will be to keep the plastic stent in place                          indefinitely for management of disconnected duct                          syndrome.                          - The findings and recommendations were discussed with                          the patient and their family.                            Endo visit 2/6/25: Dr Lolly Virgen    Assessment/Plan:  1.  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  Diabetes mellitus, type 2 vs type 3c        Abiel is a 39 year old with pancreatitis, considering surgical management.  His case is complicated by disconnected duct, morbid obesity (BMI >40), and diabetes that occurred after AP onset but with features likely of type 2 given his weight.       We met today to review two surgical options:  TPIAT or distal pancreatectomy with or without islets.  He is not a TPIAT candidate unless he loses considerable weight as he has a high risk for surgical failure and also risk of the islet infusion into a fatty liver with his current obesity.  Distal may or may not be an option; ideally I'd want to do IAT with a distal but that may not be an option if he has significant liver adiposity.  We will need to discuss next steps with the team.  He and I discussed that no matter which surgical option is pursued, I do expect him to be on some insulin lifelong after any pancreas surgery, given that he already has diabetes, and the goal would be to keep some islet function and keep near normal glucoses control and keep diabetes easier to manage.  We also talked about realistic expectations of pain improvement but not pain remission, and his goal is really just to get back to a functional  state.                                                  Dr Montes 2/6/25:      A/P:  Daily pain (though not on narcotics) and what appears to be disconnected duct distally.  BMI of 41.  Not currently able to consider for TPIAT given his weight- there has been discussion of an endo-bariatric procedure and he is not eligible for the new weight loss drugs given his pancreatitis.     I do think he would be eligible for a distal pancreatectomy- this may achieve some amount of pain relief given the disconnected duct.  I am curious about the feasibility of a sleeve gastrectomy performed concurrently with a distal pancreatectomy/splenectomy     I will refer him to our Bariatric clinic for evaluation of a combined procedure.    Dr Lay 10/2/24:    Assessment / Plan:   Abiel Weeks is a 39 year old male with PMH of necrotizing gallstone pancreatitis with recurrent acute pancreatitis c/b stricture in the partially atrophied body/tail with upstream pseudocyst and suspected disconnected duct. He was recently seen by Dr. Goodwin and underwent EUS with transluminal drainage of 58g70oh pseudocyst in the pancreatic tail through placement of LAMS and double pigtailed plastic stents and subsequent removal of LAMS with maintained plastic stents. He now reports that his chronic left sided / epigastric pain was initially improved somewhat after his most recent cystgastrostomy however pain has returned to largely same quality / severity as pre-procedure. His post-intervention CT shows appropriately placed plastic stents without complication and no identifiable target for further endoscopic intervention. Discussed options to consider for further management including an evaluation with [facility] clinic to consider interventional options as well as referral for consideration of total pancreatectomy with islet autotransplantation. Discussed that he would likely need some degree of weight loss prior to any surgical intervention  and, if he does pursue this option further, would refer for bariatric endoscopic evaluation as well.  - Will obtain A1c  - Referral for [facility]  - Referral to Dr. Montes for evaluation of TPIAT  - Would consider bariatric endoscopic evaluation if deciding to pursue TPIAT      Lab on 02/06/2025   Component Date Value Ref Range Status     Sodium 02/06/2025 138  135 - 145 mmol/L Final     Potassium 02/06/2025 3.8  3.4 - 5.3 mmol/L Final     Carbon Dioxide (CO2) 02/06/2025 24  22 - 29 mmol/L Final     Anion Gap 02/06/2025 13  7 - 15 mmol/L Final     Urea Nitrogen 02/06/2025 17.8  6.0 - 20.0 mg/dL Final     Creatinine 02/06/2025 1.06  0.67 - 1.17 mg/dL Final     GFR Estimate 02/06/2025 90  >60 mL/min/1.73m2 Final    eGFR calculated using 2021 CKD-EPI equation.     Calcium 02/06/2025 9.2  8.8 - 10.4 mg/dL Final     Chloride 02/06/2025 101  98 - 107 mmol/L Final     Glucose 02/06/2025 125 (H)  70 - 99 mg/dL Final     Alkaline Phosphatase 02/06/2025 78  40 - 150 U/L Final     AST 02/06/2025 20  0 - 45 U/L Final     ALT 02/06/2025 12  0 - 70 U/L Final     Protein Total 02/06/2025 7.3  6.4 - 8.3 g/dL Final     Albumin 02/06/2025 4.4  3.5 - 5.2 g/dL Final     Bilirubin Total 02/06/2025 0.4  <=1.2 mg/dL Final     Patient Fasting > 8hrs? 02/06/2025 Yes   Final     Vitamin A 02/06/2025 1.13  0.30 - 1.20 mg/L Final     Retinol Palmitate 02/06/2025 0.03  0.00 - 0.10 mg/L Final     Vitamin A Interp 02/06/2025 Normal   Final      This test was developed and its performance characteristics   determined by [facility]. It has not been cleared or   approved by the US Food and Drug Administration. This test   was performed in a CLIA certified laboratory and is   intended for clinical purposes.  Performed By: [facility]  [address] [place] [id number]  : Levy Lucas MD, PhD  CLIA Number: [id number]     Vitamin E 02/06/2025 12.9  5.5 - 18.0 mg/L Final      This test was  developed and its performance characteristics   determined by [facility]. It has not been cleared or   approved by the US Food and Drug Administration. This test   was performed in a CLIA certified laboratory and is   intended for clinical purposes.     Vitamin E Gamma 02/06/2025 0.9  0.0 - 6.0 mg/L Final    Performed By: [facility]  [address] [place] [id number]  : Levy Lucas MD, PhD  CLIA Number: [id number]     Estimated Average Glucose 02/06/2025 131 (H)  <117 mg/dL Final     Hemoglobin A1C 02/06/2025 6.2 (H)  <5.7 % Final    Normal <5.7%   Prediabetes 5.7-6.4%    Diabetes 6.5% or higher     Note: Adopted from ADA consensus guidelines.     Lipase 02/06/2025 32  13 - 60 U/L Final     Amylase 02/06/2025 90  28 - 100 U/L Final     Cholesterol 02/06/2025 169  <200 mg/dL Final     Triglycerides 02/06/2025 335 (H)  <150 mg/dL Final     Direct Measure HDL 02/06/2025 42  >=40 mg/dL Final     LDL Cholesterol Calculated 02/06/2025 60  <100 mg/dL Final     Non HDL Cholesterol 02/06/2025 127  <130 mg/dL Final     Patient Fasting > 8hrs? 02/06/2025 Yes   Final     25 OH Vitamin D2 02/06/2025 <5  ug/L Final     25 OH Vitamin D3 02/06/2025 25  ug/L Final     25 OH Vit D Total 02/06/2025 <30  20 - 75 ug/L Final    Season, race, dietary intake, and treatment affect the concentration of 25-hydroxy-Vitamin D. Values may decrease during winter months and increase during summer months. Values 20-29 ug/L may indicate Vitamin D insufficiency and values <20 ug/L may indicate Vitamin D deficiency.     CEA 02/06/2025 1.1  ng/mL Final    Nonsmoker (past/never) <=5.0 ng/mL   Current smoker <=6.5 ng/mL     This result is obtained using the Roche Elecsys CEA method on the ele [id number] immunoassay analyzer. Results obtained with different assay methods or kits cannot be used interchangeably.     Cancer Antigen 19-9 02/06/2025 14  <=35 U/mL Final    INTERPRETIVE INFORMATION: Cancer Antigen-GI  (CA 19-9)    This test uses Roche CA 19-9 electrochemiluminescent   immunoassay. Results obtained with different test methods   or kits cannot be used interchangeably. CA 19-9 value is   useful in monitoring pancreatic, hepatobiliary, gastric,   hepatocellular, and colorectal cancer. CA 19-9 value,   regardless of level, should not be interpreted as absolute   evidence of the presence or absence of malignant disease.  Performed By: [facility]  [address] [place] [address]  : Levy Lucas MD, PhD  CLIA Number: [id number]     Glutamic Acid Decarboxylase Antibo* 02/06/2025 <5.0  0.0 - 5.0 IU/mL Final    INTERPRETIVE INFORMATION:  Glutamic Acid Decarboxylase   Antibody    A value greater than 5.0 IU/mL is considered positive for   Glutamic Acid Decarboxylase Antibody (NOAH Ab). This assay   is intended for the semi-quantitative determination of the   NOAH Ab in human serum. Results should be interpreted within   the context of clinical symptoms.  Performed By: [facility]  [address] [place] [address]  : Levy Lucas MD, PhD  CLIA Number: [id number]     Islet Cell Antibody IgG 02/06/2025 <1:4  <1:4 Final    INTERPRETIVE INFORMATION: Islet Cell Ab, IgG    Islet cell antibodies (ICAs) are associated with type 1   diabetes (TID), an autoimmune endocrine disorder. ICAs may   be present years before the onset of clinical symptoms. To   calculate Juvenile Diabetes Foundation (JDF) units:   multiply the titer x 5 (1:8  8 x 5 = 40 JDF Units).    This test was developed and its performance characteristics   determined by [facility]. It has not been cleared or   approved by the US Food and Drug Administration. This test   was performed in a CLIA certified laboratory and is   intended for clinical purposes.  Performed By: [facility]  [address] [place] [address]  : Levy Lucas MD, PhD  CLIA  Number: [id number]     Insulin Antibodies 02/06/2025 <0.4  0.0 - 0.4 U/mL Final    INTERPRETIVE INFORMATION: Insulin Antibody    A value greater than 0.4 Kronus Units/mL is considered   positive for Insulin Antibody. Kronus units are arbitrary.   Kronus Units = U/mL.    This assay is intended for the semi-quantitative   determination of antibodies to endogenous insulin or   antibodies to exogenous insulin in human serum. Antibodies   to exogenous insulin therapies may be detected using this   method. The magnitude of the measured result is not related   to disease progression. Results should be interpreted   within the context of clinical symptoms.  Performed By: [facility]  [address] [place] [id number]  : Levy Lucas MD, PhD  IA Number: [id number]     C Peptide 02/06/2025 2.2  0.9 - 6.9 ng/mL Final    Reference range applies to fasting specimens.     Patient Fasting > 8hrs? 02/06/2025 Yes   Final     Iron 02/06/2025 64  61 - 157 ug/dL Final     Iron Binding Capacity 02/06/2025 376  240 - 430 ug/dL Final     Iron Sat Index 02/06/2025 17  15 - 46 % Final     Ferritin 02/06/2025 111  31 - 409 ng/mL Final     Hepatitis C Antibody 02/06/2025 Nonreactive  Nonreactive Final    A nonreactive screening test result does not exclude the possibility of exposure to or infection with HCV. Nonreactive screening test results in individuals with prior exposure to HCV may be due to antibody levels below the limit of detection of this assay or lack of reactivity to the HCV antigens used in this assay. Patients with recent HCV infections (<3 months from time of exposure) may have false-negative HCV antibody results due to the time needed for seroconversion (average of 8 to 9 weeks).     Cotinine Confirm 02/06/2025 <15  ng/mL Final     Nicotine Confirmation Urine 02/06/2025 <15  ng/mL Final    Comment: INTERPRETIVE INFORMATION: Nicotine and Metabolites,                             Urine,  Quantitative    Methodology: Quantitative Liquid Chromatography-Tandem Mass   Spectrometry    Positive cutoff:  Nicotine  15 ng/mL  Cotinine  15 ng/mL  3-OH-Cotinine 50 ng/mL  Anabasine        5 ng/mL    For medical purposes only; not valid for forensic use.     This test is designed to evaluate recent use of   nicotine-containing products.  Passive and active exposure   cannot be discriminated definitively, although a cutoff of   100 ng/mL cotinine is frequently used for surgery   qualification purposes.  For smoking cessation programs or   compliance testing, the absence of expected drug(s) and/or   drug metabolite(s) may indicate non-compliance,   inappropriate timing of specimen collection relative to   drug administration, poor drug absorption,   diluted/adulterated urine, or limitations of testing. The   concentration value must be greater than or equal to the   cutoff to be reported as                            positive. Anabasine is included as   a biomarker of tobacco use, versus nicotine replacement.    Interpretive questions should be directed to the   laboratory.     This test was developed and its performance characteristics   determined by [facility]. It has not been cleared or   approved by the US Food and Drug Administration. This test   was performed in a CLIA certified laboratory and is   intended for clinical purposes.  Performed By: [facility]  [address] [place], [place] [id number]  : Levy Lucas MD, PhD  CLIA Number: [id number]     3-EP-Bxikdhbc, Urn, Quant 02/06/2025 <50  ng/mL Final     Anabasine, Urn, Quant 02/06/2025 <5  ng/mL Final     PEth 16:0/18:1 (POPEth) 02/06/2025 <10  ng/mL Final    PEth 16:0/18:1 (POPEth)  Less than 10 ng/mL............Not detected  Less than 20 ng/mL............Abstinence or light alcohol   consumption  20 - 200 ng/mL................Moderate alcohol consumption  Greater than 200 ng/mL........Heavy alcohol consumption or    chronic alcohol use    (Reference: JANELL Sam and MARGARET Briscoe 2018 J. Forensic Sci)     PEth 16:0/18:2 (PLPEth) 02/06/2025 <10  ng/mL Final    Reference ranges are not well established.     EER Phosphatidylethanol (PETH) 02/06/2025 See Note   Final    Authorized individuals can access the AR Enhanced Report  with an [employer] Connect account using the following link.     Your local lab can assist you in obtaining the patient  report if you don't have a Connect account.     [email or web address]     PEth Interpretation 02/06/2025 See Comment   Final    Comment: Phosphatidylethanol (PEth) is a group of phospholipids   formed in the presence of ethanol, phospholipase D and   phosphatidylcholine. PEth is known to be a direct alcohol   biomarker. The predominant PEth homologues are PEth   16:0/18:1 (POPEth) and PEth 16:0/18:2 (PLPEth), which   account for 37-46% and 26-28% of the total PEth homologues,   respectively. PEth is incorporated into the phospholipid   membrane of red blood cells and has a general half-life of   4-10 days and a window of detection of 2-4 weeks. However,   the window of detection is longer in individuals who   chronically or excessively consume alcohol. The limit of   quantification is 10 ng/mL. Serial monitoring of PEth may   be helpful in monitoring alcohol abstinence over time. PEth   results should be interpreted in the context of the   patient's clinical and behavioral history.  Patients with advanced liver disease may have falsely   elevated PEth concentrations (Elizabeth DORANTES et al 2018,   Alcoholism Clinical &                            Experimental Research).    This test was developed and its performance characteristics   determined by [facility]. It has not been cleared or   approved by the U.S. Food and Drug Administration. This   test was performed in a CLIA-certified laboratory and is   intended for clinical purposes.  Performed By: [facility]  [address]  [place] [address]  : Levy Lucas MD, PhD  CLIA Number: [id number]     WBC Count 02/06/2025 8.9  4.0 - 11.0 10e3/uL Final     RBC Count 02/06/2025 4.80  4.40 - 5.90 10e6/uL Final     Hemoglobin 02/06/2025 13.9  13.3 - 17.7 g/dL Final     Hematocrit 02/06/2025 42.0  40.0 - 53.0 % Final     MCV 02/06/2025 88  78 - 100 fL Final     MCH 02/06/2025 29.0  26.5 - 33.0 pg Final     MCHC 02/06/2025 33.1  31.5 - 36.5 g/dL Final     RDW 02/06/2025 13.2  10.0 - 15.0 % Final     Platelet Count 02/06/2025 210  150 - 450 10e3/uL Final     % Neutrophils 02/06/2025 67  % Final     % Lymphocytes 02/06/2025 23  % Final     % Monocytes 02/06/2025 7  % Final     % Eosinophils 02/06/2025 2  % Final     % Basophils 02/06/2025 1  % Final     % Immature Granulocytes 02/06/2025 0  % Final     NRBCs per 100 WBC 02/06/2025 0  <1 /100 Final     Absolute Neutrophils 02/06/2025 6.0  1.6 - 8.3 10e3/uL Final     Absolute Lymphocytes 02/06/2025 2.0  0.8 - 5.3 10e3/uL Final     Absolute Monocytes 02/06/2025 0.6  0.0 - 1.3 10e3/uL Final     Absolute Eosinophils 02/06/2025 0.2  0.0 - 0.7 10e3/uL Final     Absolute Basophils 02/06/2025 0.1  0.0 - 0.2 10e3/uL Final     Absolute Immature Granulocytes 02/06/2025 0.0  <=0.4 10e3/uL Final     Absolute NRBCs 02/06/2025 0.0  10e3/uL Final     C Peptide 02/06/2025 6.0  0.9 - 6.9 ng/mL Final    Reference range applies to fasting specimens.     Patient Fasting > 8hrs? 02/06/2025 No   Final     Glucose 02/06/2025 184 (H)  70 - 99 mg/dL Final     Patient Fasting > 8hrs? 02/06/2025 No   Final     C Peptide 02/06/2025 6.3  0.9 - 6.9 ng/mL Final    Reference range applies to fasting specimens.     Patient Fasting > 8hrs? 02/06/2025 Yes   Final     Glucose 02/06/2025 122 (H)  70 - 99 mg/dL Final     Patient Fasting > 8hrs? 02/06/2025 Yes   Final       FIB-4 Calculation: 1.13 at 2/6/2025  8:25 AM  Calculated from:  SGOT/AST: 20 U/L at 2/6/2025  8:25 AM  SGPT/ALT: 12 U/L at  2/6/2025  8:25 AM  Platelets: 210 10e3/uL at 2/6/2025  8:25 AM  Age: 39 years    Fib-4 < 1.3: No further evaluation at this point, unless other concerns    - If the Fib-4 is >2.67  Fibroscan and elective liver clinic referral    - Intermediate Fib-4 scores: Get a Fibroscan, consider repeating this in 1-2 years.          2/20/2025     7:27 AM   SAM Score (Last Two)   SAM Raw Score 30    Activation Score 56    SAM Level 3        Patient-reported         PHYSICAL EXAM:  Objective   Physical Exam   GENERAL: alert and no distress  EYES: Eyes grossly normal to inspection.  No discharge or erythema, or obvious scleral/conjunctival abnormalities.  RESP: No audible wheeze, cough, or visible cyanosis.    SKIN: Visible skin clear. No significant rash, abnormal pigmentation or lesions.  NEURO: Cranial nerves grossly intact.  Mentation and speech appropriate for age.  PSYCH: Appropriate affect, tone, and pace of words  Sincerely,     Cassidy Cruz PA-C      45 minutes spent by me on the date of the encounter doing chart review, history and exam, documentation and further activities per the note    Virtual Visit Details    Type of service:  Video Visit   Video Start Time:  805  Video End Time: 854    Originating Location (pt. Location): Home    Distant Location (provider location):  Off-site  Platform used for Video Visit: Sheri      Again, thank you for allowing me to participate in the care of your patient.      Sincerely,    Cassidy Cruz PA-C

## 2025-02-20 NOTE — Clinical Note
Hi all, I saw Abiel today for new bariatric consult.  I'm looping the whole team in to discuss plan for him.  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  His BMI is 41 and he needs weight loss for future TPIAT. There has been discussion of endoscopic sleeve vs. Lap sleeve and also possible combined sleeve/TPIAT procedures.    -What would goal BMI be as this may determine if endoscopic sleeve would be effective enough to reach the goal weight -Dr Stroud, I don't think we have ever done combined sleeve and TPIAT, is this something you would consider? -obviously GLP1 are contraindicated with his pancreatitis history but I discussed starting topiramate to help with medical weight loss as we plan for next steps.  Let me know if there are any concerns with this.  Thanks Cassidy

## 2025-02-20 NOTE — NURSING NOTE
Current patient location: [address] [place] [address] [address]  [place] [address]    Is the patient currently in the state of [place]? YES    Visit mode: VIDEO    If the visit is dropped, the patient can be reconnected by:VIDEO VISIT: Text to cell phone:   Telephone Information:   Mobile [phone]       Will anyone else be joining the visit? NO  (If patient encounters technical issues they should call [phone]    Are changes needed to the allergy or medication list? No    Are refills needed on medications prescribed by this physician? Discuss with provider    Rooming Documentation:  Questionnaire(s) completed    Reason for visit: Consult    Pt states 4/10 is normal pain and experiencing today left abdominal pain-pt believes pancreatitis related.    Pt states no weight available within last week.    Gallo SANTILLANF

## 2025-02-20 NOTE — Clinical Note
Clinic coordinators: Pt needs the following follow up: RD/RD new abdirahman classes RD visit one month after class MTM 4 weeks to follow up topiramate start Cassidy 3-4 months return MWM video (ok to use new abdirahman spot) Dr Stroud first available to discuss sleeve video or in person  Mary, his situation is unusual as we are discussing ESG vs LSG and before or during TPIAT procedure.  I would like him to still attend class but just heads up he's not straightforward like others. I let him know this too.    I've reached to Dr Del Rosario to see if one of her team can see him for psych eval sooner rather than later.    Thanks! Cassidy

## 2025-02-20 NOTE — PROGRESS NOTES
"New Bariatric Surgery Consultation Note    2025    RE: Abiel Weeks  MR#: [id number]  : 1985      Referring provider:       2025    10:22 PM   --   Who referred you Dr. Montes       Chief Complaint/Reason for visit: evaluation for possible weight loss surgery    Dear Brandon Garcia MD (General),    I had the pleasure of seeing your patient, Abiel Weeks, to evaluate his obesity and consider him for possible weight loss surgery.  As you know, Abiel Weeks is 39 year old.  He has a height of 6' 0\", a weight of 297 lbs 0 oz, and calculated Body mass index is 40.28 kg/m ..  Full intake/assessment was done to determine barriers to weight loss success and develop a treatment plan.  Lost 160 lbs when first getting sick with pancreatitis [id number].  Gained 30 lbs in the last 6 months.     Assessment & Plan   Problem List Items Addressed This Visit          Endocrine Diagnoses    Pancreatic pseudocyst    Type 2 diabetes mellitus with chronic kidney disease, without long-term current use of insulin, unspecified CKD stage (H)       Other    Chronic pain disorder     Other Visit Diagnoses       Morbid obesity with BMI of 40.0-44.9, adult (H)        Relevant Medications    topiramate (TOPAMAX) 25 MG tablet    Other Relevant Orders    Lipid panel reflex to direct LDL Fasting    Vitamin D Deficiency    Vitamin B12    Parathyroid Hormone Intact           39 y.o. 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 Takes metformin    [facility] managing chronic abdominal pain  Seen by Dr Virgen (endo), Dr Montes (surgery), Dr Lay (GI). Will message team and Dr Stroud/Dr Campos to discuss plan.  Discussed surgical and medical weight mgmt options  Not a candidate for GLP1 due hx of pancreatitis.    Consider topiramate. No hx of kidney stones or glaucoma. Rare " migraine headaches.   Start topiramate to 75mg     Follow up:  RD  Dr Stroud to discuss ESG vs LSG.  ESG may not be sufficient to get to weight loss goal of BMI 30.      HISTORY OF PRESENT ILLNESS:      2/19/2025    10:22 PM   Weight Loss History Reviewed with Patient   How long have you been overweight? Since late 20's to early 40's   What is the most that you have ever weighed 425   What is the most weight you have lost? 160   I have tried the following methods to lose weight Watching portions or calories    Physician directed program   I have tried the following weight loss medications? (Check all that apply) None       CO-MORBIDITIES OF OBESITY INCLUDE:      2/19/2025    10:22 PM   --   I have the following health issues associated with obesity Type II Diabetes    Heart Disease    High Blood Pressure    Sleep Apnea doesn't use CPAP doesn't tolerate    Sleep study 4/17 2019 - obstructive sleep apnea and few central/mixed apneas. CPAP recommended.      GERD (Reflux)         PAST MEDICAL HISTORY:  Past Medical History:   Diagnosis Date    Chronic kidney disease     Chronic recurrent pancreatitis (H)     Depressive disorder 1994    Have had it since I was young.    Diabetes (H)     DM2 (diabetes mellitus, type 2) (H)     NOAH (generalized anxiety disorder)     Gastroesophageal reflux disease     History of diabetes mellitus 2022    Type 2    Hyperparathyroidism     Hypertension     Kidney failure 2019    Stage 3 but no dialysis.    MDD (major depressive disorder)     Nonischemic cardiomyopathy (H)     Obese     KAYLA (obstructive sleep apnea)     Other chronic pain     Other mental problems 2021    PTSD    PTSD (post-traumatic stress disorder)     Seasonal allergic rhinitis     Sleep apnea     Stomach problems 2019    Pancreatitis (Recurrent)       PAST SURGICAL HISTORY:  Past Surgical History:   Procedure Laterality Date    ABDOMEN SURGERY  2024    Lala had several all endoscopic    CHOLECYSTECTOMY      ENDOSCOPIC  ULTRASOUND UPPER GASTROINTESTINAL TRACT (GI) N/A 07/18/2024    Procedure: ENDOSCOPIC ULTRASOUND, ESOPHAGOSCOPY / UPPER GASTROINTESTINAL TRACT (GI) with cystgastrostomy;  Surgeon: Murtaza Villafuerte MD;  Location: [facility] OR    ESOPHAGOSCOPY, GASTROSCOPY, DUODENOSCOPY (EGD), COMBINED N/A 07/25/2024    Procedure: ESOPHAGOGASTRODUODENOSCOPY with flouroscopy with Axios stent removal;  Surgeon: Murtaza Villafuerte MD;  Location: [facility] OR       FAMILY HISTORY:   Family History   Problem Relation Age of Onset    Diabetes Mother     Breast Cancer Mother     Substance Abuse Father     Substance Abuse Paternal Grandfather     Crohn's Disease Brother     Depression Brother     Depression Brother     Schizophrenia Paternal Uncle        SOCIAL HISTORY:       2/19/2025    10:22 PM   Social History Questions Reviewed With Patient   Which best describes your employment status (select all that apply) I am unemployed   Which best describes your marital status single   Who do you have in your support network that can be available to help you for the first 2 weeks after surgery? My mother.   Who can you count on for support throughout your weight loss surgery journey? My mother, and the rest of my family.     Quit drinking completely in 2018 when getting sick  Alcohol abuse after divorce at age 24 for 4-5 years    HABITS:      2/19/2025    10:22 PM   --   How often do you drink alcohol? Never   Do you currently use any of the following Nicotine products? No   Have you ever used any of the following nicotine products? Cigars   If you previously used any of these products, what year did you quit? 2018   Have you or are you currently using street drugs or prescription strength medication for which you do not have a prescription for? No   Do you have a history of chemical dependency (alcohol or drug abuse)? Yes     Currently taking narcotic/opioids No    PSYCHOLOGICAL HISTORY:       2/19/2025    10:22 PM   Psychological History Reviewed With  Patient   Have you ever attempted suicide? More than 10 years ago.   Have you had thoughts of suicide in the past year? Yes   Have you ever been hospitalized for mental illness or a suicide attempt? Never.   Do you have a history of chronic pain? Yes   Have you ever been diagnosed with fibromyalgia? No   Are you currently being treated for any of the following? (select all that apply) Depression    Anxiety    Post traumatic stress disorder   Are you currently seeing a therapist or counselor? Yes   Are you currently seeing a psychiatrist? No       ROS:      2/19/2025    10:22 PM   --   Skin None of the above   HEENT Missing teeth   If you answered yes to missing teeth, please indicate how many 8   Musculoskeletal None of the above   Cardiovascular None of the above   Pulmonary Snoring   Gastrointestinal Heartburn    Reflux    Constipation   Genitourinary None of the above   Hematological None of the above   Neurological None of the above       EATING BEHAVIORS:      2/19/2025    10:22 PM   --   Have you or anyone else thought that you had an eating disorder? Yes   If you answered yes to the previous eating disorder question, select the types that apply from this list Binge Eating   Do you currently binge eat (eat a large amount of food in a short time)? Yes   Are you an emotional eater? Yes   Do you get up to eat after falling asleep? No   Can you afford 3 meals a day? Yes   Can you afford 50-60 dollars a month for vitamins? No       EXERCISE:      2/19/2025    10:22 PM   --   How often do you exercise? Less than 1 time per week   What is the duration of your exercise (in minutes)? 10 Minutes   What types of exercise do you do? walking   What keeps you from being more active? I am as active as I can possbily be    Pain       MEDICATIONS:  Current Outpatient Medications   Medication Sig Dispense Refill    topiramate (TOPAMAX) 25 MG tablet 25mg at bedtime for week 1, 50mg at bedtime for 1 week, and 75mg at bedtime  thereafter 90 tablet 3    acetaminophen (TYLENOL) 500 MG tablet Take 1,000 mg by mouth every 8 hours as needed      albuterol (PROAIR HFA/PROVENTIL HFA/VENTOLIN HFA) 108 (90 Base) MCG/ACT inhaler Inhale 2 puffs into the lungs every 4 hours as needed for shortness of breath, wheezing or cough      alum & mag hydroxide-simethicone (CONSTANTINO-LANTA) 200-200-20 MG/5ML SUSP suspension Take 15 mLs by mouth every 4 hours as needed.      amitriptyline (ELAVIL) 25 MG tablet Take 75 mg by mouth at bedtime.      amLODIPine (NORVASC) 10 MG tablet Take 1 tablet by mouth daily      carvedilol (COREG) 25 MG tablet Take 50 mg by mouth 2 times daily (with meals)      Continuous Glucose Sensor (DEXCOM G7 SENSOR) MISC Change every 10 days. 3 each 5    DULoxetine (CYMBALTA) 60 MG capsule Take 60 mg by mouth daily      hydrALAZINE (APRESOLINE) 100 MG tablet Take 100 mg by mouth 3 times daily      isosorbide mononitrate (IMDUR) 30 MG 24 hr tablet Take 90 mg by mouth at bedtime      lipase-protease-amylase (CREON) 64410-35541-058383 units CPEP per EC capsule Take 2-3 with meals / 1-2 with snacks, up to 15 per day. 450 capsule 6    medical cannabis (Patient's own supply) See Admin Instructions. (The purpose of this order is to document that the patient reports taking medical cannabis.  This is not a prescription, and is not used to certify that the patient has a qualifying medical condition.)      metFORMIN (GLUCOPHAGE) 1000 MG tablet Take 1,000 mg by mouth 2 times daily (with meals)      Multiple Vitamins-Minerals (SUPER THERA TOÑO M) TABS Take 1 tablet by mouth daily      ondansetron (ZOFRAN ODT) 4 MG ODT tab Place 4 mg under the tongue every 8 hours as needed      pantoprazole (PROTONIX) 40 MG EC tablet Take 40 mg by mouth 2 times daily (with meals)      polyethylene glycol (MIRALAX) 17 g packet Take 17 g by mouth daily as needed      pregabalin (LYRICA) 150 MG capsule Take 1 capsule by mouth 3 times daily         Is patient on biologics or  immunomodulators? No    ALLERGIES:  Allergies   Allergen Reactions    Iodine Hives, Itching and Rash     Pt allergic to iodine/ shellfish , contrast dyes.     Pt allergic to iodine/ shellfish , contrast dyes.    Trazodone Other (See Comments)     priapsim    Amoxicillin      Patient does not what type of reaction/happened as a child    Citrus Nausea    Methylcellulose     Shellfish-Derived Products Nausea and Nausea and Vomiting     Other Reaction(s): GI Upset, Unknown     EGD 7/25/24:    Findings:       The Z-line was regular and was found 45 cm from the incisors.        The examined duodenum was normal.        Two stents, one lumen opposing metal stent (LAMS) and a pigtailed        plastic stent, were found on the lesser curvature of the gastric body.        The stent were in place and draining pus/purulent output. A two channel        therapeutic scope was used with two rat-toothed forceps under        fluoroscopy guidance. The LAMS (Axios) was successfully removed while        keeping the double pgitailed plastic stent in place (within the        pseudocyst). The position of the double pigtailed plastic stent was        confirmed using fluoroscopy.                                                                                    Impression:            - EGD with fluoroscopy                          - Removal of LAMS (Axios) while keeping the plastic                          stent in place   Recommendation:        - Observe patient in same day observation unit for                          possible discharge same day.                          - Resume previous diet.                          - Follow with Dr. Lay or Dr. Villafuerte in                          GI/pancreatic clinic prn if recurrent symptoms occur.                          The plan will be to keep the plastic stent in place                          indefinitely for management of disconnected duct                          syndrome.                           - The findings and recommendations were discussed with                          the patient and their family.                            Endo visit 2/6/25: Dr Lolly Virgen    Assessment/Plan:  1.  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  Diabetes mellitus, type 2 vs type 3c        Abiel is a 39 year old with pancreatitis, considering surgical management.  His case is complicated by disconnected duct, morbid obesity (BMI >40), and diabetes that occurred after AP onset but with features likely of type 2 given his weight.       We met today to review two surgical options:  TPIAT or distal pancreatectomy with or without islets.  He is not a TPIAT candidate unless he loses considerable weight as he has a high risk for surgical failure and also risk of the islet infusion into a fatty liver with his current obesity.  Distal may or may not be an option; ideally I'd want to do IAT with a distal but that may not be an option if he has significant liver adiposity.  We will need to discuss next steps with the team.  He and I discussed that no matter which surgical option is pursued, I do expect him to be on some insulin lifelong after any pancreas surgery, given that he already has diabetes, and the goal would be to keep some islet function and keep near normal glucoses control and keep diabetes easier to manage.  We also talked about realistic expectations of pain improvement but not pain remission, and his goal is really just to get back to a functional state.                                                  Dr Montes 2/6/25:      A/P:  Daily pain (though not on narcotics) and what appears to be disconnected duct distally.  BMI of 41.  Not currently able to consider for TPIAT given his weight- there has been discussion of an endo-bariatric procedure and he is not eligible for the new weight loss drugs given his pancreatitis.     I do think he would be eligible for a distal  pancreatectomy- this may achieve some amount of pain relief given the disconnected duct.  I am curious about the feasibility of a sleeve gastrectomy performed concurrently with a distal pancreatectomy/splenectomy     I will refer him to our Bariatric clinic for evaluation of a combined procedure.    Dr Lay 10/2/24:    Assessment / Plan:   Abiel Weeks is a 39 year old male with PMH of necrotizing gallstone pancreatitis with recurrent acute pancreatitis c/b stricture in the partially atrophied body/tail with upstream pseudocyst and suspected disconnected duct. He was recently seen by Dr. Goodwin and underwent EUS with transluminal drainage of [id number] pseudocyst in the pancreatic tail through placement of LAMS and double pigtailed plastic stents and subsequent removal of LAMS with maintained plastic stents. He now reports that his chronic left sided / epigastric pain was initially improved somewhat after his most recent cystgastrostomy however pain has returned to largely same quality / severity as pre-procedure. His post-intervention CT shows appropriately placed plastic stents without complication and no identifiable target for further endoscopic intervention. Discussed options to consider for further management including an evaluation with [facility] to consider interventional options as well as referral for consideration of total pancreatectomy with islet autotransplantation. Discussed that he would likely need some degree of weight loss prior to any surgical intervention and, if he does pursue this option further, would refer for bariatric endoscopic evaluation as well.  - Will obtain A1c  - Referral for [facility]  - Referral to Dr. Montes for evaluation of TPIAT  - Would consider bariatric endoscopic evaluation if deciding to pursue TPIAT      Lab on 02/06/2025   Component Date Value Ref Range Status    Sodium 02/06/2025 138  135 - 145 mmol/L Final    Potassium 02/06/2025 3.8   3.4 - 5.3 mmol/L Final    Carbon Dioxide (CO2) 02/06/2025 24  22 - 29 mmol/L Final    Anion Gap 02/06/2025 13  7 - 15 mmol/L Final    Urea Nitrogen 02/06/2025 17.8  6.0 - 20.0 mg/dL Final    Creatinine 02/06/2025 1.06  0.67 - 1.17 mg/dL Final    GFR Estimate 02/06/2025 90  >60 mL/min/1.73m2 Final    eGFR calculated using 2021 CKD-EPI equation.    Calcium 02/06/2025 9.2  8.8 - 10.4 mg/dL Final    Chloride 02/06/2025 101  98 - 107 mmol/L Final    Glucose 02/06/2025 125 (H)  70 - 99 mg/dL Final    Alkaline Phosphatase 02/06/2025 78  40 - 150 U/L Final    AST 02/06/2025 20  0 - 45 U/L Final    ALT 02/06/2025 12  0 - 70 U/L Final    Protein Total 02/06/2025 7.3  6.4 - 8.3 g/dL Final    Albumin 02/06/2025 4.4  3.5 - 5.2 g/dL Final    Bilirubin Total 02/06/2025 0.4  <=1.2 mg/dL Final    Patient Fasting > 8hrs? 02/06/2025 Yes   Final    Vitamin A 02/06/2025 1.13  0.30 - 1.20 mg/L Final    Retinol Palmitate 02/06/2025 0.03  0.00 - 0.10 mg/L Final    Vitamin A Interp 02/06/2025 Normal   Final      This test was developed and its performance characteristics   determined by [facility]. It has not been cleared or   approved by the US Food and Drug Administration. This test   was performed in a CLIA certified laboratory and is   intended for clinical purposes.  Performed By: [facility]  [address] [place] [id number]  : Levy Lucas MD, PhD  CLIA Number: [id number]    Vitamin E 02/06/2025 12.9  5.5 - 18.0 mg/L Final      This test was developed and its performance characteristics   determined by [facility]. It has not been cleared or   approved by the US Food and Drug Administration. This test   was performed in a CLIA certified laboratory and is   intended for clinical purposes.    Vitamin E Gamma 02/06/2025 0.9  0.0 - 6.0 mg/L Final    Performed By: [facility]  [address] [place] [address]  : Levy Lucas MD, PhD  AYAZ  Number: [id number]    Estimated Average Glucose 02/06/2025 131 (H)  <117 mg/dL Final    Hemoglobin A1C 02/06/2025 6.2 (H)  <5.7 % Final    Normal <5.7%   Prediabetes 5.7-6.4%    Diabetes 6.5% or higher     Note: Adopted from ADA consensus guidelines.    Lipase 02/06/2025 32  13 - 60 U/L Final    Amylase 02/06/2025 90  28 - 100 U/L Final    Cholesterol 02/06/2025 169  <200 mg/dL Final    Triglycerides 02/06/2025 335 (H)  <150 mg/dL Final    Direct Measure HDL 02/06/2025 42  >=40 mg/dL Final    LDL Cholesterol Calculated 02/06/2025 60  <100 mg/dL Final    Non HDL Cholesterol 02/06/2025 127  <130 mg/dL Final    Patient Fasting > 8hrs? 02/06/2025 Yes   Final    25 OH Vitamin D2 02/06/2025 <5  ug/L Final    25 OH Vitamin D3 02/06/2025 25  ug/L Final    25 OH Vit D Total 02/06/2025 <30  20 - 75 ug/L Final    Season, race, dietary intake, and treatment affect the concentration of 25-hydroxy-Vitamin D. Values may decrease during winter months and increase during summer months. Values 20-29 ug/L may indicate Vitamin D insufficiency and values <20 ug/L may indicate Vitamin D deficiency.    CEA 02/06/2025 1.1  ng/mL Final    Nonsmoker (past/never) <=5.0 ng/mL   Current smoker <=6.5 ng/mL     This result is obtained using the Roche Elecsys CEA method on the ele [id number] immunoassay analyzer. Results obtained with different assay methods or kits cannot be used interchangeably.    Cancer Antigen 19-9 02/06/2025 14  <=35 U/mL Final    INTERPRETIVE INFORMATION: Cancer Antigen-GI (CA 19-9)    This test uses Roche CA 19-9 electrochemiluminescent   immunoassay. Results obtained with different test methods   or kits cannot be used interchangeably. CA 19-9 value is   useful in monitoring pancreatic, hepatobiliary, gastric,   hepatocellular, and colorectal cancer. CA 19-9 value,   regardless of level, should not be interpreted as absolute   evidence of the presence or absence of malignant disease.  Performed By: [facility]  [place] [id number]  : Levy Lucas MD, PhD  CLIA Number: [id number]    Glutamic Acid Decarboxylase Antibo* 02/06/2025 <5.0  0.0 - 5.0 IU/mL Final    INTERPRETIVE INFORMATION:  Glutamic Acid Decarboxylase   Antibody    A value greater than 5.0 IU/mL is considered positive for   Glutamic Acid Decarboxylase Antibody (NOAH Ab). This assay   is intended for the semi-quantitative determination of the   NOAH Ab in human serum. Results should be interpreted within   the context of clinical symptoms.  Performed By: [facility]  [address] [place] [address]  : Levy Lucas MD, PhD  CLIA Number: [id number]    Islet Cell Antibody IgG 02/06/2025 <1:4  <1:4 Final    INTERPRETIVE INFORMATION: Islet Cell Ab, IgG    Islet cell antibodies (ICAs) are associated with type 1   diabetes (TID), an autoimmune endocrine disorder. ICAs may   be present years before the onset of clinical symptoms. To   calculate Juvenile Diabetes Foundation (JDF) units:   multiply the titer x 5 (1:8  8 x 5 = 40 JDF Units).    This test was developed and its performance characteristics   determined by [facility]. It has not been cleared or   approved by the US Food and Drug Administration. This test   was performed in a CLIA certified laboratory and is   intended for clinical purposes.  Performed By: [facility]  [address] [place] [address]  : Levy Lucas MD, PhD  CLIA Number: [id number]    Insulin Antibodies 02/06/2025 <0.4  0.0 - 0.4 U/mL Final    INTERPRETIVE INFORMATION: Insulin Antibody    A value greater than 0.4 Kronus Units/mL is considered   positive for Insulin Antibody. Kronus units are arbitrary.   Kronus Units = U/mL.    This assay is intended for the semi-quantitative   determination of antibodies to endogenous insulin or   antibodies to exogenous insulin in human serum. Antibodies   to exogenous insulin  therapies may be detected using this   method. The magnitude of the measured result is not related   to disease progression. Results should be interpreted   within the context of clinical symptoms.  Performed By: [facility]  [address] [place] [id number]  : Levy Lucas MD, PhD  CLIA Number: [id number]    C Peptide 02/06/2025 2.2  0.9 - 6.9 ng/mL Final    Reference range applies to fasting specimens.    Patient Fasting > 8hrs? 02/06/2025 Yes   Final    Iron 02/06/2025 64  61 - 157 ug/dL Final    Iron Binding Capacity 02/06/2025 376  240 - 430 ug/dL Final    Iron Sat Index 02/06/2025 17  15 - 46 % Final    Ferritin 02/06/2025 111  31 - 409 ng/mL Final    Hepatitis C Antibody 02/06/2025 Nonreactive  Nonreactive Final    A nonreactive screening test result does not exclude the possibility of exposure to or infection with HCV. Nonreactive screening test results in individuals with prior exposure to HCV may be due to antibody levels below the limit of detection of this assay or lack of reactivity to the HCV antigens used in this assay. Patients with recent HCV infections (<3 months from time of exposure) may have false-negative HCV antibody results due to the time needed for seroconversion (average of 8 to 9 weeks).    Cotinine Confirm 02/06/2025 <15  ng/mL Final    Nicotine Confirmation Urine 02/06/2025 <15  ng/mL Final    Comment: INTERPRETIVE INFORMATION: Nicotine and Metabolites,                             Urine, Quantitative    Methodology: Quantitative Liquid Chromatography-Tandem Mass   Spectrometry    Positive cutoff:  Nicotine  15 ng/mL  Cotinine  15 ng/mL  3-OH-Cotinine 50 ng/mL  Anabasine        5 ng/mL    For medical purposes only; not valid for forensic use.     This test is designed to evaluate recent use of   nicotine-containing products.  Passive and active exposure   cannot be discriminated definitively, although a cutoff of   100 ng/mL cotinine is frequently  used for surgery   qualification purposes.  For smoking cessation programs or   compliance testing, the absence of expected drug(s) and/or   drug metabolite(s) may indicate non-compliance,   inappropriate timing of specimen collection relative to   drug administration, poor drug absorption,   diluted/adulterated urine, or limitations of testing. The   concentration value must be greater than or equal to the   cutoff to be reported as                            positive. Anabasine is included as   a biomarker of tobacco use, versus nicotine replacement.    Interpretive questions should be directed to the   laboratory.     This test was developed and its performance characteristics   determined by [facility]. It has not been cleared or   approved by the US Food and Drug Administration. This test   was performed in a CLIA certified laboratory and is   intended for clinical purposes.  Performed By: [facility]  [address] [place] [address]  : Levy Lucas MD, PhD  CLIA Number: [id number]    [id number], Urn, Quant 02/06/2025 <50  ng/mL Final    Anabasine, Urn, Quant 02/06/2025 <5  ng/mL Final    PEth 16:0/18:1 (POPEth) 02/06/2025 <10  ng/mL Final    PEth 16:0/18:1 (POPEth)  Less than 10 ng/mL............Not detected  Less than 20 ng/mL............Abstinence or light alcohol   consumption  20 - 200 ng/mL................Moderate alcohol consumption  Greater than 200 ng/mL........Heavy alcohol consumption or   chronic alcohol use    (Reference: JANELL Sam and MARGARET Briscoe 2018 J. Forensic Sci)    PEth 16:0/18:2 (PLPEth) 02/06/2025 <10  ng/mL Final    Reference ranges are not well established.    EER Phosphatidylethanol (PETH) 02/06/2025 See Note   Final    Authorized individuals can access the [facility] Enhanced Report  with an [facility] Connect account using the following link.     Your local lab can assist you in obtaining the patient  report if you don't have a Connect account.      [email or web address]    PEth Interpretation 02/06/2025 See Comment   Final    Comment: Phosphatidylethanol (PEth) is a group of phospholipids   formed in the presence of ethanol, phospholipase D and   phosphatidylcholine. PEth is known to be a direct alcohol   biomarker. The predominant PEth homologues are PEth   16:0/18:1 (POPEth) and PEth 16:0/18:2 (PLPEth), which   account for 37-46% and 26-28% of the total PEth homologues,   respectively. PEth is incorporated into the phospholipid   membrane of red blood cells and has a general half-life of   4-10 days and a window of detection of 2-4 weeks. However,   the window of detection is longer in individuals who   chronically or excessively consume alcohol. The limit of   quantification is 10 ng/mL. Serial monitoring of PEth may   be helpful in monitoring alcohol abstinence over time. PEth   results should be interpreted in the context of the   patient's clinical and behavioral history.  Patients with advanced liver disease may have falsely   elevated PEth concentrations (Elizabeth DORANTES et al 2018,   Alcoholism Clinical &                            Experimental Research).    This test was developed and its performance characteristics   determined by [facility]. It has not been cleared or   approved by the U.S. Food and Drug Administration. This   test was performed in a CLIA-certified laboratory and is   intended for clinical purposes.  Performed By: [facility]  [address] [place] [id number]  : Levy Lucas MD, PhD  CLIA Number: [id number]    WBC Count 02/06/2025 8.9  4.0 - 11.0 10e3/uL Final    RBC Count 02/06/2025 4.80  4.40 - 5.90 10e6/uL Final    Hemoglobin 02/06/2025 13.9  13.3 - 17.7 g/dL Final    Hematocrit 02/06/2025 42.0  40.0 - 53.0 % Final    MCV 02/06/2025 88  78 - 100 fL Final    MCH 02/06/2025 29.0  26.5 - 33.0 pg Final    MCHC 02/06/2025 33.1  31.5 - 36.5 g/dL Final    RDW  02/06/2025 13.2  10.0 - 15.0 % Final    Platelet Count 02/06/2025 210  150 - 450 10e3/uL Final    % Neutrophils 02/06/2025 67  % Final    % Lymphocytes 02/06/2025 23  % Final    % Monocytes 02/06/2025 7  % Final    % Eosinophils 02/06/2025 2  % Final    % Basophils 02/06/2025 1  % Final    % Immature Granulocytes 02/06/2025 0  % Final    NRBCs per 100 WBC 02/06/2025 0  <1 /100 Final    Absolute Neutrophils 02/06/2025 6.0  1.6 - 8.3 10e3/uL Final    Absolute Lymphocytes 02/06/2025 2.0  0.8 - 5.3 10e3/uL Final    Absolute Monocytes 02/06/2025 0.6  0.0 - 1.3 10e3/uL Final    Absolute Eosinophils 02/06/2025 0.2  0.0 - 0.7 10e3/uL Final    Absolute Basophils 02/06/2025 0.1  0.0 - 0.2 10e3/uL Final    Absolute Immature Granulocytes 02/06/2025 0.0  <=0.4 10e3/uL Final    Absolute NRBCs 02/06/2025 0.0  10e3/uL Final    C Peptide 02/06/2025 6.0  0.9 - 6.9 ng/mL Final    Reference range applies to fasting specimens.    Patient Fasting > 8hrs? 02/06/2025 No   Final    Glucose 02/06/2025 184 (H)  70 - 99 mg/dL Final    Patient Fasting > 8hrs? 02/06/2025 No   Final    C Peptide 02/06/2025 6.3  0.9 - 6.9 ng/mL Final    Reference range applies to fasting specimens.    Patient Fasting > 8hrs? 02/06/2025 Yes   Final    Glucose 02/06/2025 122 (H)  70 - 99 mg/dL Final    Patient Fasting > 8hrs? 02/06/2025 Yes   Final       FIB-4 Calculation: 1.13 at 2/6/2025  8:25 AM  Calculated from:  SGOT/AST: 20 U/L at 2/6/2025  8:25 AM  SGPT/ALT: 12 U/L at 2/6/2025  8:25 AM  Platelets: 210 10e3/uL at 2/6/2025  8:25 AM  Age: 39 years    Fib-4 < 1.3: No further evaluation at this point, unless other concerns    - If the Fib-4 is >2.67  Fibroscan and elective liver clinic referral    - Intermediate Fib-4 scores: Get a Fibroscan, consider repeating this in 1-2 years.          2/20/2025     7:27 AM   SAM Score (Last Two)   SAM Raw Score 30    Activation Score 56    SAM Level 3        Patient-reported         PHYSICAL EXAM:  Objective    Physical Exam    GENERAL: alert and no distress  EYES: Eyes grossly normal to inspection.  No discharge or erythema, or obvious scleral/conjunctival abnormalities.  RESP: No audible wheeze, cough, or visible cyanosis.    SKIN: Visible skin clear. No significant rash, abnormal pigmentation or lesions.  NEURO: Cranial nerves grossly intact.  Mentation and speech appropriate for age.  PSYCH: Appropriate affect, tone, and pace of words  Sincerely,     Cassidy Cruz PA-C      45 minutes spent by me on the date of the encounter doing chart review, history and exam, documentation and further activities per the note    Virtual Visit Details    Type of service:  Video Visit   Video Start Time:  805  Video End Time: 854    Originating Location (pt. Location): Home    Distant Location (provider location):  Off-site  Platform used for Video Visit: Cardiio

## 2025-02-25 ENCOUNTER — VIRTUAL VISIT (OUTPATIENT)
Dept: ENDOCRINOLOGY | Facility: CLINIC | Age: 40
End: 2025-02-25
Payer: COMMERCIAL

## 2025-02-25 VITALS — HEIGHT: 72 IN | BODY MASS INDEX: 40.27 KG/M2

## 2025-02-25 DIAGNOSIS — E66.01 MORBID OBESITY WITH BMI OF 40.0-44.9, ADULT (H): Primary | ICD-10-CM

## 2025-02-25 PROCEDURE — 99207 PR NO CHARGE NURSE ONLY: CPT | Mod: 95

## 2025-02-25 NOTE — LETTER
April 3, 2025   Re: Abiel Weeks   Address: [facility] [address]  [place] [address]   : 1985       Dear Mental Health Provider,     Thank you for taking the time to see Abiel for a preoperative psychological evaluation for bariatric surgery. The following elements are required by our program for screening patients for bariatric surgery:     1. Intake Assessment   Identifying data: reason for pursuing surgery.   History of Present Illness: weight loss history, typical eating patterns, exercise, and leisure time activities, coping skills.   Psychiatric History: If a mental health condition is present, documented verification is required noting that the condition is it under successful treatment.   Medical History: allergies, emergencies, head traumas, etc.   Family History   Social and Development History: abuse/neglect, level of functioning, education, employment.   Substance Abuse and Addictions: prescription and OTC meds/herbs, alcohol and related substances, nicotine, caffeine/pop, gambling, shopping. Absence of active substance use disorder.   Mental Status: appearance, behavior, mood, and affect, thought content and thought process. Is patient able to provide an informed consent?   Social Network: marriage, children, friends, current living situation. Family and social supports have been assessed, and strategies to strengthen those supports have been recommended.   Legal History       2. MMPI   3. Summary of current findings: emotional stability, conclusions, and recommendations.   4. Follow-up visit to discuss the results of the MMPI or other tests, conclusions, and recommendations.     The evaluation must confirm that the patient is emotionally stable to proceed with the surgery, cognitively capable of understanding the risks and realistic goals of the surgical procedure and prepared to comply with the long-term aftercare and behavioral changes expected after surgery.     We feel it  is important for our patients to establish a relationship with a mental health provider prior to surgery since the changes they experience postoperatively can be overwhelming. Therefore, having an established relationship is essential to help them cope effectively with these life-altering changes.     If you have any questions, feel free to contact us via our [employer] [facility] at [phone]. Please fax the evaluation to the number below.     Sincerely,     Comprehensive Weight Management Team     [facility]   [facility]   [address], [address], [place], [place], [address]     Ph: [phone]   Fax: [phone]

## 2025-02-25 NOTE — LETTER
April 3, 2025   Re: Abiel Weeks   Address: [address]  [place] [address]   : 1985       Dear Sleep Medicine Provider,     Thank you for taking the time to see Abiel for a pre-operative clearance evaluation for bariatric surgery. This patient is referred for clearance because they have known KAYLA, have symptoms of KAYLA or have a BMI > 50 per our program protocol.     Please assist us with the following during your evaluation of our mutual patient:     Initial patient evaluation   Arrange and expedite necessary testing.   Guide and facilitate follow-up treatment.   Provide a letter stating that the patient is cleared to proceed with bariatric surgery from a sleep medicine standpoint.   Indicate what treatment is needed pre-surgery (if any), during inpatient hospitalization and after surgery.     The evaluation must confirm that the patient is stable from a sleep medicine standpoint to proceed with the surgery. If you have any questions, feel free to contact us via our [employer] [facility] at [phone]. Please fax the evaluation to the number below.     Sincerely,     Comprehensive Weight Management Team     [facility]   [address], [address], [place], [place], [address]     Ph: [phone]   Fax: [phone]

## 2025-02-25 NOTE — LETTER
"    April 3, 2025   Re: Abiel Weeks   Address: [facility] [address]  [place] [address]   : 1985       Dear Primary Care Provider,     Thank you for coordinating with us to evaluate Abiel for possible bariatric surgery.     It is important to us that the primary care provider is aware of their patients' desire to have weight loss surgery and is supportive of the patient having surgery. If the patient meets criteria and clearances for surgery, we will submit to the insurance company for insurance approval and coordinate the needed appointments with our department. If you have any questions, feel free to contact us via our [employer] [facility] at [phone]. The letter of support can be faxed to the number below or routed via [facility] life[employer] games to our team.       Sample letter:     \"Dear Weight Loss Surgery Clinic,     I am the primary care provider for (patient name) and I support (him/her/they) having weight loss surgery.     Sincerely,     (provider name)\"     Sincerely,     Comprehensive Weight Management Team     [facility]   [facility]   [address], [address], [place], [place], [address]     Ph: [phone]   Fax: [phone]       "

## 2025-03-03 ENCOUNTER — VIRTUAL VISIT (OUTPATIENT)
Dept: TRANSPLANT | Facility: CLINIC | Age: 40
End: 2025-03-03
Attending: PEDIATRICS
Payer: COMMERCIAL

## 2025-03-03 DIAGNOSIS — K86.3 PANCREATIC PSEUDOCYST: ICD-10-CM

## 2025-03-03 DIAGNOSIS — K86.1 CHRONIC RECURRENT PANCREATITIS (H): ICD-10-CM

## 2025-03-03 DIAGNOSIS — E21.3 HYPERPARATHYROIDISM: ICD-10-CM

## 2025-03-03 DIAGNOSIS — G89.4 CHRONIC PAIN DISORDER: ICD-10-CM

## 2025-03-03 DIAGNOSIS — I42.8 NONISCHEMIC CARDIOMYOPATHY (H): ICD-10-CM

## 2025-03-03 DIAGNOSIS — E11.22 TYPE 2 DIABETES MELLITUS WITH CHRONIC KIDNEY DISEASE, WITHOUT LONG-TERM CURRENT USE OF INSULIN, UNSPECIFIED CKD STAGE (H): ICD-10-CM

## 2025-03-03 PROCEDURE — 97803 MED NUTRITION INDIV SUBSEQ: CPT | Mod: TEL,95 | Performed by: DIETITIAN, REGISTERED

## 2025-03-03 NOTE — PROGRESS NOTES
Pt evaluated via billable telephone visit. Time spent: 30 min  Provider location: offsite     [facility]  OUTPATIENT MNT: TP AIT EVALUATION    Current BMI: 40.2 (HT 72 in,  lbs/135 kg)- data from 2/28/25   Goal BMI for TP AIT <30  Goal Weight for TP AIT <220 lbs (77 lb loss)     TIME SPENT: 30 minutes  VISIT TYPE: follow up   REFERRING PHYSICIAN: Simon   PT ACCOMPANIED BY: self    NUTRITION ASSESSMENT  Pt is working with abdirahman DEY, last met on 2/28/25. He reports not being told about a specific weight goal prior to possible pancreatectomy.    Saw Cassidy too, meeting with Dr Stroud in the future   DM II x 2-3 years . Checks BG- has dexcom G7 ordered, but waiting on it to be filled  No fingerstick checks in the past  On metformin only  Per Cassidy:  Not candidate for GLP 1 w/ panc  Consider topamax     Per Dr Montes's note, possible distal pancreatectomy vs TP    Lab Results   Component Value Date    A1C 6.2 02/06/2025     Fecal Elastase on file: no    - Appetite: variable depends on the day; if he can afford medical cannabis, this helps his appetite  - Food allergies/intolerances: citrus and shellfish cause GI upset   - Issues chewing or swallowing: ?? Thinks food sometimes doesn't go down but did not get more details about this   - H/o nutrition support: no TF, ?TPN maybe while IP at some point per pt recollection   - Food access concerns: not asked     Symptoms:  - Pain: chronic   - N/V: some nausea daily, minimal vomiting (was taking zofran for nausea but has to fill Rx)  - Bloating: yes with most/any kind of food   - D/C: diarrhea and constipation alternating; takes stool softener daily    Vitamins, Supplements, Pertinent Meds: MVI  Herbal Medicines/Supplements: melatonin    Protein Supplements: Equate Nutritional Shake Diabetic Care- 10 g protein (when PO is minimal)    PERT: Creon 24000 x 2 with snacks and 3 with meals; (533 ul/kg/meal - within therapeutic range)  - How enzymes  are taken in relation to meal: just at time of meal  - Duration of PERT: unsure, but likely ~1 yr   - Improvements seen since starting: yes, feels he can eat somewhat more variety    - Oily/floaty stools (steatorrhea): less frequent than in the past  - Tried other enzyme brands in the past: none    Weight hx:   - highest wt 425 lbs >6 yrs ago   - lost wt to lowest of 235 lbs with active job, illness, etc  - wt has been trending up lately   Wt Readings from Last 10 Encounters:   02/28/25 134.7 kg (297 lb)   02/20/25 134.7 kg (297 lb)   02/06/25 134.7 kg (297 lb)   02/06/25 135.2 kg (298 lb 1 oz)   02/05/25 (!) 136.9 kg (301 lb 11.2 oz)   11/19/24 125.6 kg (277 lb)   08/29/24 115.7 kg (255 lb)   07/25/24 119.2 kg (262 lb 12.6 oz)   07/18/24 118 kg (260 lb 2.3 oz)   06/17/24 121.6 kg (268 lb)     PHYSICAL ACTIVITY  Minimal - standing/twisting/walking hurts so reports physical function has overall declined over time     DIET RECALL- sporadic eating - may overeat on good days and then feels bad the next day - vicious cycle repeats  Today- handful of reduced fat Cheez its   Yesterday- a few sugar free popsicles, 1 box triscuits, a few bowls of Lon Charms  Reports trying to limit meat (chicken & red meat causes GI upset), does some eggs. Dislikes fish.   Lisa- water, coffee, occasional Gatorade zero/skim milk/Diet Pepsi  No alcohol     NUTRITION DIAGNOSIS  Inadequate protein intake related to altered GI function related to chronic pancreatitis as evidenced by pt report/diet recall showing very low and inconsistent protein intake.     NUTRITION INTERVENTION  Nutrition education provided:  - Encouraged some reliable source of protein now, both for a balanced diet overall and to help assist w/ weight loss. Consider a whey protein powder mixed with milk or water.   - Along with protein intake, encouraged consistent veggie consumption as able/as tolerated.   - Pt will continue working with medical wt mgmt team.     Brief  overview of what to expect from nutrition standpoint post TP AIT:   -- Enteral nutrition regimen and anticipated diet advancement   -- Ongoing need for PERT  -- Vitamin/mineral needs, assessing for fat-soluble vitamin deficiencies  -- Need for DM education and brief overview of cho counting    Patient Understanding: Pt verbalized understanding of education provided.  Expected Engagement: Good  Follow-Up Plans: PRN     NUTRITION GOALS  Goal for 1 protein shake/supplement per day at minimum     Ayla Martinez, RD, LD, CCTD

## 2025-03-03 NOTE — LETTER
3/3/2025      Abiel Weeks  [address]  [place] [address]      Dear Colleague,    Thank you for referring your patient, Abiel Weeks, to the [facility]. Please see a copy of my visit note below.    Pt evaluated via billable telephone visit. Time spent: 30 min  Provider location: offsite     [facility]  OUTPATIENT MNT: TP AIT EVALUATION    Current BMI: 40.2 (HT 72 in,  lbs/135 kg)- data from 2/28/25   Goal BMI for TP AIT <30  Goal Weight for TP AIT <220 lbs (77 lb loss)     TIME SPENT: 30 minutes  VISIT TYPE: follow up   REFERRING PHYSICIAN: Simon   PT ACCOMPANIED BY: self    NUTRITION ASSESSMENT  Pt is working with abdirahman DEY, last met on 2/28/25. He reports not being told about a specific weight goal prior to possible pancreatectomy.    Saw Cassidy too, meeting with Dr Stroud in the future   DM II x 2-3 years . Checks BG- has dexcom G7 ordered, but waiting on it to be filled  No fingerstick checks in the past  On metformin only  Per Cassidy:  Not candidate for GLP 1 w/ panc  Consider topamax     Per Dr Montes's note, possible distal pancreatectomy vs TP    Lab Results   Component Value Date    A1C 6.2 02/06/2025     Fecal Elastase on file: no    - Appetite: variable depends on the day; if he can afford medical cannabis, this helps his appetite  - Food allergies/intolerances: citrus and shellfish cause GI upset   - Issues chewing or swallowing: ?? Thinks food sometimes doesn't go down but did not get more details about this   - H/o nutrition support: no TF, ?TPN maybe while IP at some point per pt recollection   - Food access concerns: not asked     Symptoms:  - Pain: chronic   - N/V: some nausea daily, minimal vomiting (was taking zofran for nausea but has to fill Rx)  - Bloating: yes with most/any kind of food   - D/C: diarrhea and constipation alternating; takes stool softener daily    Vitamins, Supplements, Pertinent Meds:  MVI  Herbal Medicines/Supplements: melatonin    Protein Supplements: Equate Nutritional Shake Diabetic Care- 10 g protein (when PO is minimal)    PERT: Creon 24000 x 2 with snacks and 3 with meals; (533 ul/kg/meal - within therapeutic range)  - How enzymes are taken in relation to meal: just at time of meal  - Duration of PERT: unsure, but likely ~1 yr   - Improvements seen since starting: yes, feels he can eat somewhat more variety    - Oily/floaty stools (steatorrhea): less frequent than in the past  - Tried other enzyme brands in the past: none    Weight hx:   - highest wt 425 lbs >6 yrs ago   - lost wt to lowest of 235 lbs with active job, illness, etc  - wt has been trending up lately   Wt Readings from Last 10 Encounters:   02/28/25 134.7 kg (297 lb)   02/20/25 134.7 kg (297 lb)   02/06/25 134.7 kg (297 lb)   02/06/25 135.2 kg (298 lb 1 oz)   02/05/25 (!) 136.9 kg (301 lb 11.2 oz)   11/19/24 125.6 kg (277 lb)   08/29/24 115.7 kg (255 lb)   07/25/24 119.2 kg (262 lb 12.6 oz)   07/18/24 118 kg (260 lb 2.3 oz)   06/17/24 121.6 kg (268 lb)     PHYSICAL ACTIVITY  Minimal - standing/twisting/walking hurts so reports physical function has overall declined over time     DIET RECALL- sporadic eating - may overeat on good days and then feels bad the next day - vicious cycle repeats  Today- handful of reduced fat Cheez its   Yesterday- a few sugar free popsicles, 1 box triscuits, a few bowls of Lon Charms  Reports trying to limit meat (chicken & red meat causes GI upset), does some eggs. Dislikes fish.   Lisa- water, coffee, occasional Gatorade zero/skim milk/Diet Pepsi  No alcohol     NUTRITION DIAGNOSIS  Inadequate protein intake related to altered GI function related to chronic pancreatitis as evidenced by pt report/diet recall showing very low and inconsistent protein intake.     NUTRITION INTERVENTION  Nutrition education provided:  - Encouraged some reliable source of protein now, both for a balanced diet overall  and to help assist w/ weight loss. Consider a whey protein powder mixed with milk or water.   - Along with protein intake, encouraged consistent veggie consumption as able/as tolerated.   - Pt will continue working with medical wt mgmt team.     Brief overview of what to expect from nutrition standpoint post TP AIT:   -- Enteral nutrition regimen and anticipated diet advancement   -- Ongoing need for PERT  -- Vitamin/mineral needs, assessing for fat-soluble vitamin deficiencies  -- Need for DM education and brief overview of cho counting    Patient Understanding: Pt verbalized understanding of education provided.  Expected Engagement: Good  Follow-Up Plans: PRN     NUTRITION GOALS  Goal for 1 protein shake/supplement per day at minimum     Ayla Martinez RD, LD, CCTD                                  Again, thank you for allowing me to participate in the care of your patient.        Sincerely,        Ayla Martinez RD    Electronically signed

## 2025-03-04 ENCOUNTER — VIRTUAL VISIT (OUTPATIENT)
Dept: NEUROPSYCHOLOGY | Facility: CLINIC | Age: 40
End: 2025-03-04
Payer: COMMERCIAL

## 2025-03-04 DIAGNOSIS — F33.1 MODERATE EPISODE OF RECURRENT MAJOR DEPRESSIVE DISORDER (H): Primary | ICD-10-CM

## 2025-03-04 PROCEDURE — 96132 NRPSYC TST EVAL PHYS/QHP 1ST: CPT | Mod: 95

## 2025-03-04 PROCEDURE — 96133 NRPSYC TST EVAL PHYS/QHP EA: CPT | Mod: 95

## 2025-03-04 PROCEDURE — 96138 PSYCL/NRPSYC TECH 1ST: CPT | Mod: 95

## 2025-03-04 PROCEDURE — 90791 PSYCH DIAGNOSTIC EVALUATION: CPT | Mod: 95

## 2025-03-04 PROCEDURE — 96139 PSYCL/NRPSYC TST TECH EA: CPT | Mod: 95

## 2025-03-04 NOTE — LETTER
3/4/2025       RE: Abiel Weeks  [address]  [place] [address]     Dear Colleague,    Thank you for referring your patient, Abiel Weeks, to the [facility] [place] at [facility]. Please see a copy of my visit note below.    The patient was seen for neuropsychological evaluation at the request of Lolly Virgen MD for the purposes of diagnostic clarification and treatment planning. 65 minutes of video test administration and scoring were provided by this writer. Please see Dr. Purvi Solano's report for a full interpretation of the findings.    Sondra Aguilar  Psychometrist        NEUROPSYCHOLOGICAL EVALUATION  **CONFIDENTIAL**    **This is a medical document intended for communication with the referring provider. It is written in medical language and may contain abbreviations or verbiage that are unfamiliar. It may appear blunt or direct. This report and the results within are not intended to be interpreted in isolation without consultation with your medical provider. **      Name: Abiel Weeks  Education: 12 years    (age): 1985 (39 years)  MEJIA:  3/4/2025   Referral: Lolly Virgen MD   Department of Endocrinology MRN (Epic): [id number]        IDENTIFYING INFORMATION AND REASON FOR REFERRAL   Mr. Weeks is a 39-year-old White male with a history including chronic recurrent pancreatitis, nonischemic cardiomyopathy, essential hypertension, type 2 diabetes, hyperparathyroidism, KAYLA, chronic pain, generalized anxiety disorder, depression, ADHD, and alcohol use disorder (in sustained remission).  This evaluation was requested to provide an assessment of his current neuropsychological status as part of a comprehensive workup for total pancreatectomy with islet autotransplantation.     IMPRESSION  See below for relevant background information and detailed test results. See separate abstract  for supporting documentation including a list of neuropsychological measures and test scores.    With regard to transplant candidacy, Mr. Weeks demonstrated an adequate understanding of his health conditions and he was able to discuss some risks and benefits of total pancreatectomy with islet autotransplantation. He appears to have adequate psychosocial supports in place and recognizes the need for increased functional assistance during the post-operative period. He continues to use cannabis daily for nausea and pain. He has abstained from alcohol use for over 6 years. Thus, he demonstrates the capacity to make lifestyle changes, which is a positive outcome indicator. His history is notable for depression, anxiety, and PTSD, with ongoing symptoms that are being managed with medications and psychotherapy. Assessment of current psychological and emotional status through self-report questionaries revealed moderate symptoms of depression and moderate symptoms of anxiety, consistent with his report during the clinical interview. He admitted to passive death thoughts and thoughts of self-harm recently, but he has not engaged in self-harm behaviors in several years. Despite these ongoing mental health symptoms, he does not appear to be experiencing emotional problems that might interfere with his judgment or ability to follow through with treatment recommendations, though he is strongly encouraged to continue taking his medications as prescribed and continue regular participation in psychotherapy.    Overall, reviewed records and clinical interview with the patient did not reveal current cognitive or psychiatric limitations that would preclude total pancreatectomy with islet autotransplantation. My subjective clinical impression is that significant cognitive impairment is not present. Formal cognitive testing was not completed; however, if the treatment team develops concerns, cognitive testing can be arranged.      RECOMMENDATIONS  There are no obvious neuropsychological contraindications for total pancreatectomy with islet autotransplantation.   As noted above, he is strongly encouraged to continue regular follow-up with his prescribing physicians regarding medication management of his mental health symptoms and to continue his participation in individual psychotherapy. Mr. Weeks was not deemed to be an imminent risk to himself at this time, though given his report of occasional passive fleeting thoughts of death and self-harm, his providers are encouraged to regularly check in regarding the presence of any suicidal ideation or engagement in self-harm behaviors.   Mr. Weeks stated he has yet to discuss specific risks and complications of total pancreatectomy with islet autotransplantation with his treatment team. Thus, he would likely benefit from continued conversations with his treatment team regarding these risks and any lifestyle changes recommended prior to and after surgery  Cognitive testing was not completed. If the transplant team has concerns about Mr. Weeks's cognitive abilities, a full neuropsychological evaluation with cognitive testing could be completed in the future.     Thank you for the opportunity to participate in Mr. Weeks's care. If you have any questions regarding this evaluation, please do not hesitate to contact me.       Purvi Solano, Ph.D., LP, ABPP  Board Certified Clinical Neuropsychologist    RELEVANT BACKGROUND INFORMATION AND SUPPORTING DOCUMENTATION  Gathered from a clinical interview with the patient and reviews of electronic medical record.     History of Presenting Problem(s)  Mr. Weeks presented with a history including chronic recurrent pancreatitis, nonischemic cardiomyopathy, essential hypertension, type 2 diabetes, hyperparathyroidism, KAYLA, chronic pain, generalized anxiety disorder, depression, and alcohol use disorder (in sustained remission). Cognitively, he  described longstanding problems with attention and noted he was diagnosed with ADHD in childhood. He noted occasionally losing his train of thought but denied notable changes to his attentional abilities recently. He denied any other cognitive changes or concerns. Specifically, he stated he has a good memory and noted he is better at planning and organizing than he used to be. He acknowledged he has forgotten about appointments in the past, but he now writes upcoming events on a calendar and checks MyChart often. He denied any problems with managing his medications with the use of alarms and he noted he has been doing better with managing his finances. He does not drive, and he has never had a 's license. His mother prepares most of his meals due to difficultly standing. He noted his basic self-cares take longer to complete due to physical limitations, but he is able to compete these activities of daily living independently. Physically, he noted chronic pain in his abdomen 2/2 pancreatitis. He described balance and gait disturbance at times in the context of pain, high blood pressure, and medication effects. He stated his hands shake at times if he is excited or anxious, but he denied other physical complaints or sensorimotor disturbances. Emotionally, Mr. Weeks stated his mood is  hit or miss . He acknowledged a longstanding history of depression, anxiety, and PTSD, with mental health symptoms being managed with medication and therapy.      Mr. Weeks expressed a high degree of motivation for total pancreatectomy with islet autotransplantation with the hopes of having less pain and being able to return to the workforce.  He was able to articulate a basic lay knowledge and understanding of his medical conditions and some risks of transplant (i.e. infection, internal bleeding), though he stated he has not had comprehensive discussions with his treatment team about the possible complications. He was aware  of some necessary lifestyle changes required such as careful monitoring of his diet. His current living situation is stable. He is living with his mother, who will provide post-transplant assistance as needed.     Neuroimaging Findings   None available    Medical History (per EMR)  Patient Active Problem List   Diagnosis     Chronic pain disorder     Chronic recurrent pancreatitis (H)     Elevated transaminase level     Essential hypertension     NOAH (generalized anxiety disorder)     Hyperparathyroidism     Moderate episode of recurrent major depressive disorder (H)     Nonischemic cardiomyopathy (H)     KAYLA (obstructive sleep apnea)     Pancreatic pseudocyst     Type 2 diabetes mellitus with chronic kidney disease, without long-term current use of insulin, unspecified CKD stage (H)     Alcohol use disorder, severe, in sustained remission (H)     Health Behaviors  Sleep: 4-5 hours of cumulative sleep nightly. Sleep onset is aided with amitriptyline though he has trouble staying asleep due to pain. Energy is low most days and he often naps at least once per day. He reported a history of KAYLA though he is not adherent with his PAP device (stopped using it ~2 years ago).   Exercise: Walking ~10 minutes 2-3 times per week  Pain: Chronic abdominal pain rated 5/10, managed with Tylenol and gabapentin.     Psychiatric History  Mr. Weeks stated his mood is  hit or miss . He acknowledged low mood in the context of his health issues and described feeling down and sad daily. He described a longstanding history of depression that is managed with medication and therapy.   He also reported longstanding anxiety with worries that are difficult to control. He described worrying about his health and finances and noted anxiety in social situations.   He reported a history of PTSD with ongoing distressing post-traumatic symptoms, though he noted medications have been helpful in managing these symptoms.   He otherwise denied history  of hypomanic or manic mood symptoms, engagement in repetitive or compulsive behaviors, alteration of sensory perceptual processes or disordered thought.  Suicidality/ Self-harm: He admitted to suicidal ideation in his past with remote thoughts on a plan. He noted suicidal intent around the age of 9 but not since that time. He described longstanding and frequent fleeting thoughts of self-harm including as recently as a few weeks ago. He denied any past suicide attempts but described a pattern of intentional engagement in risky behaviors in his 20s (e.g., drug use, crossing the street without looking). He denied any recent engagement in risky behavior that could cause him harm.  He admitted to passive death thoughts at times but denied any recent suicidal ideation, plan, or intent.  Psychiatric treatment: He is currently prescribed duloxetine and amitriptyline. He is engaged in individual psychotherapy every week or every other week.     Substance Use History  Alcohol: None; last drank in 2018 (had 4 beers)  Nicotine: None  Cannabis: Daily use (smoking and using edibles) for nausea and pain  Problematic Substance Use: History of alcohol use disorder in sustained remission. History of drug use in the past (psychedelics and opioids) but he has not used illicit substances in 8 years.     Current Medications (per EMR)  Mr. Weeks has a current medication list which includes the following prescription(s): acetaminophen, albuterol, alum & mag hydroxide-simethicone, amitriptyline, amlodipine, carvedilol, dexcom g7 sensor, duloxetine, hydralazine, isosorbide mononitrate, lipase-protease-amylase, medical cannabis, metformin, super thera rosas m, ondansetron, pantoprazole, polyethylene glycol, pregabalin, and topiramate.    Educational, & Occupational History  Childhood behavioral / emotional / academic problems: Reported he was diagnosed with ADHD in childhood and was on medications for attention in the past. Noted some  behavioral issues in high school but he denied any learning difficulties or need for academic accommodations, special education, or repeating grades.   Native Language: English  Education: Graduated high school  Occupation: Factory work, retail, food services. Stopped working 2 years ago in the context of his health (chronic pancreatitis). He is currently in appeals for the 5th time as he tries to get approval for disability benefits.     Psychosocial History  Born and raised in [place], [place]  Marital:   Children: None  Housing: Lives with his mother and 2 cats  Psychosocial support:  Pretty good  social support network including his family (mom, dad, brothers), and friends.    Family History  No known immediate family history of dementia or neurological conditions     RESULTS    PSYCHOLOGICAL AND BEHAVIORAL: He endorsed moderate symptoms of depression (BDI=23) and moderate symptoms of anxiety (GERMÁN=24) on self-report questionnaires. On a self-report measure of personality and psychopathology, his responses were suggestive of valid and consistent responding. He endorsed multiple somatic complaints characterized by a general sense of malaise (poor health and feeling weak or tired), consistent with his medical history. He reported excessive worry including worries about misfortune and finances. He described a history of substance use, and described himself as engaging in instrumentally aggressive behavior, consistent with his report of behavioral challenges in his past.     BEHAVIORAL OBSERVATIONS  Alert, attentive and focused while undergoing clinical interview and questionnaires  Appearance: Well-groomed, casually dressed  Gait/Posture: Not assessed during this virtual visit  Sensorimotor: Not assessed during this virtual visit  Behavior: Cooperative, pleasant, no behavioral abnormalities noted  Speech: Fluent, articulate, normal rate, prosody, and volume; no conversational word finding difficulty  Thought  process: Logical, linear, and goal-directed  Thought content: Logical, appropriate   Affect: Broad, responsive, consistent with reported mood; good eye contact  Mood: Euthymic  Insight and Judgment: Intact  Rapport: Easily established and maintained      This evaluation was conducted via a telehealth visit.  Video-Visit Details   Type of service:  Video Visit   Video End Time: 10:48  Originating Location (pt. Location): Home  Distant Location (provider location):  [facility] [place]  Platform used for Video Visit: Blink Messenger  Activities included in this evaluation: CPT Code #Units Time (min)   Psychiatric diagnostic interview [id number] --   Test evaluation services by professional; first hour. [id number] 1 95   Test evaluation services by professional, additional hour (+) 47034 1    Test administration and scoring by technician, first 30 mins [id number] 1 [id number]   Test administration and scoring by technician, additional 30 mins (+) [id number] 1     F33.1    Again, thank you for allowing me to participate in the care of your patient.      Sincerely,    ANDRES PACE

## 2025-03-04 NOTE — PROGRESS NOTES
The patient was seen for neuropsychological evaluation at the request of Lolly Virgen MD for the purposes of diagnostic clarification and treatment planning. 65 minutes of video test administration and scoring were provided by this writer. Please see Dr. Purvi Solano's report for a full interpretation of the findings.    Sondra Aguilar  Psychometrist

## 2025-03-11 NOTE — PROGRESS NOTES
NEUROPSYCHOLOGICAL EVALUATION  **CONFIDENTIAL**    **This is a medical document intended for communication with the referring provider. It is written in medical language and may contain abbreviations or verbiage that are unfamiliar. It may appear blunt or direct. This report and the results within are not intended to be interpreted in isolation without consultation with your medical provider. **      Name: Abiel Weeks  Education: 12 years    (age): 1985 (39 years)  MEJIA:  3/4/2025   Referral: Lolly Virgen MD   Department of Endocrinology MRN (Epic): [id number]        IDENTIFYING INFORMATION AND REASON FOR REFERRAL   Mr. Weeks is a 39-year-old White male with a history including chronic recurrent pancreatitis, nonischemic cardiomyopathy, essential hypertension, type 2 diabetes, hyperparathyroidism, KAYLA, chronic pain, generalized anxiety disorder, depression, ADHD, and alcohol use disorder (in sustained remission).  This evaluation was requested to provide an assessment of his current neuropsychological status as part of a comprehensive workup for total pancreatectomy with islet autotransplantation.     IMPRESSION  See below for relevant background information and detailed test results. See separate abstract for supporting documentation including a list of neuropsychological measures and test scores.    With regard to transplant candidacy, Mr. Weeks demonstrated an adequate understanding of his health conditions and he was able to discuss some risks and benefits of total pancreatectomy with islet autotransplantation. He appears to have adequate psychosocial supports in place and recognizes the need for increased functional assistance during the post-operative period. He continues to use cannabis daily for nausea and pain. He has abstained from alcohol use for over 6 years. Thus, he demonstrates the capacity to make lifestyle changes, which is a positive outcome indicator. His history is notable  for depression, anxiety, and PTSD, with ongoing symptoms that are being managed with medications and psychotherapy. Assessment of current psychological and emotional status through self-report questionaries revealed moderate symptoms of depression and moderate symptoms of anxiety, consistent with his report during the clinical interview. He admitted to passive death thoughts and thoughts of self-harm recently, but he has not engaged in self-harm behaviors in several years. Despite these ongoing mental health symptoms, he does not appear to be experiencing emotional problems that might interfere with his judgment or ability to follow through with treatment recommendations, though he is strongly encouraged to continue taking his medications as prescribed and continue regular participation in psychotherapy.    Overall, reviewed records and clinical interview with the patient did not reveal current cognitive or psychiatric limitations that would preclude total pancreatectomy with islet autotransplantation. My subjective clinical impression is that significant cognitive impairment is not present. Formal cognitive testing was not completed; however, if the treatment team develops concerns, cognitive testing can be arranged.     RECOMMENDATIONS  There are no obvious neuropsychological contraindications for total pancreatectomy with islet autotransplantation.   As noted above, he is strongly encouraged to continue regular follow-up with his prescribing physicians regarding medication management of his mental health symptoms and to continue his participation in individual psychotherapy. Mr. Weeks was not deemed to be an imminent risk to himself at this time, though given his report of occasional passive fleeting thoughts of death and self-harm, his providers are encouraged to regularly check in regarding the presence of any suicidal ideation or engagement in self-harm behaviors.   Mr. Weeks stated he has yet to discuss  specific risks and complications of total pancreatectomy with islet autotransplantation with his treatment team. Thus, he would likely benefit from continued conversations with his treatment team regarding these risks and any lifestyle changes recommended prior to and after surgery  Cognitive testing was not completed. If the transplant team has concerns about Mr. Weeks's cognitive abilities, a full neuropsychological evaluation with cognitive testing could be completed in the future.     Thank you for the opportunity to participate in Mr. Weeks's care. If you have any questions regarding this evaluation, please do not hesitate to contact me.       Purvi Solano, Ph.D., LP, ABPP  Board Certified Clinical Neuropsychologist    RELEVANT BACKGROUND INFORMATION AND SUPPORTING DOCUMENTATION  Gathered from a clinical interview with the patient and reviews of electronic medical record.     History of Presenting Problem(s)  Mr. Weeks presented with a history including chronic recurrent pancreatitis, nonischemic cardiomyopathy, essential hypertension, type 2 diabetes, hyperparathyroidism, KAYLA, chronic pain, generalized anxiety disorder, depression, and alcohol use disorder (in sustained remission). Cognitively, he described longstanding problems with attention and noted he was diagnosed with ADHD in childhood. He noted occasionally losing his train of thought but denied notable changes to his attentional abilities recently. He denied any other cognitive changes or concerns. Specifically, he stated he has a good memory and noted he is better at planning and organizing than he used to be. He acknowledged he has forgotten about appointments in the past, but he now writes upcoming events on a calendar and checks MyChart often. He denied any problems with managing his medications with the use of alarms and he noted he has been doing better with managing his finances. He does not drive, and he has never had a 's  license. His mother prepares most of his meals due to difficultly standing. He noted his basic self-cares take longer to complete due to physical limitations, but he is able to compete these activities of daily living independently. Physically, he noted chronic pain in his abdomen 2/2 pancreatitis. He described balance and gait disturbance at times in the context of pain, high blood pressure, and medication effects. He stated his hands shake at times if he is excited or anxious, but he denied other physical complaints or sensorimotor disturbances. Emotionally, Mr. Weeks stated his mood is  hit or miss . He acknowledged a longstanding history of depression, anxiety, and PTSD, with mental health symptoms being managed with medication and therapy.      Mr. Weeks expressed a high degree of motivation for total pancreatectomy with islet autotransplantation with the hopes of having less pain and being able to return to the workforce.  He was able to articulate a basic lay knowledge and understanding of his medical conditions and some risks of transplant (i.e. infection, internal bleeding), though he stated he has not had comprehensive discussions with his treatment team about the possible complications. He was aware of some necessary lifestyle changes required such as careful monitoring of his diet. His current living situation is stable. He is living with his mother, who will provide post-transplant assistance as needed.     Neuroimaging Findings   None available    Medical History (per EMR)  Patient Active Problem List   Diagnosis    Chronic pain disorder    Chronic recurrent pancreatitis (H)    Elevated transaminase level    Essential hypertension    NOAH (generalized anxiety disorder)    Hyperparathyroidism    Moderate episode of recurrent major depressive disorder (H)    Nonischemic cardiomyopathy (H)    KAYLA (obstructive sleep apnea)    Pancreatic pseudocyst    Type 2 diabetes mellitus with chronic kidney  disease, without long-term current use of insulin, unspecified CKD stage (H)    Alcohol use disorder, severe, in sustained remission (H)     Health Behaviors  Sleep: 4-5 hours of cumulative sleep nightly. Sleep onset is aided with amitriptyline though he has trouble staying asleep due to pain. Energy is low most days and he often naps at least once per day. He reported a history of KAYLA though he is not adherent with his PAP device (stopped using it ~2 years ago).   Exercise: Walking ~10 minutes 2-3 times per week  Pain: Chronic abdominal pain rated 5/10, managed with Tylenol and gabapentin.     Psychiatric History  Mr. Weeks stated his mood is  hit or miss . He acknowledged low mood in the context of his health issues and described feeling down and sad daily. He described a longstanding history of depression that is managed with medication and therapy.   He also reported longstanding anxiety with worries that are difficult to control. He described worrying about his health and finances and noted anxiety in social situations.   He reported a history of PTSD with ongoing distressing post-traumatic symptoms, though he noted medications have been helpful in managing these symptoms.   He otherwise denied history of hypomanic or manic mood symptoms, engagement in repetitive or compulsive behaviors, alteration of sensory perceptual processes or disordered thought.  Suicidality/ Self-harm: He admitted to suicidal ideation in his past with remote thoughts on a plan. He noted suicidal intent around the age of 9 but not since that time. He described longstanding and frequent fleeting thoughts of self-harm including as recently as a few weeks ago. He denied any past suicide attempts but described a pattern of intentional engagement in risky behaviors in his 20s (e.g., drug use, crossing the street without looking). He denied any recent engagement in risky behavior that could cause him harm.  He admitted to passive death  thoughts at times but denied any recent suicidal ideation, plan, or intent.  Psychiatric treatment: He is currently prescribed duloxetine and amitriptyline. He is engaged in individual psychotherapy every week or every other week.     Substance Use History  Alcohol: None; last drank in 2018 (had 4 beers)  Nicotine: None  Cannabis: Daily use (smoking and using edibles) for nausea and pain  Problematic Substance Use: History of alcohol use disorder in sustained remission. History of drug use in the past (psychedelics and opioids) but he has not used illicit substances in 8 years.     Current Medications (per EMR)  Mr. Weeks has a current medication list which includes the following prescription(s): acetaminophen, albuterol, alum & mag hydroxide-simethicone, amitriptyline, amlodipine, carvedilol, dexcom g7 sensor, duloxetine, hydralazine, isosorbide mononitrate, lipase-protease-amylase, medical cannabis, metformin, super thera rosas m, ondansetron, pantoprazole, polyethylene glycol, pregabalin, and topiramate.    Educational, & Occupational History  Childhood behavioral / emotional / academic problems: Reported he was diagnosed with ADHD in childhood and was on medications for attention in the past. Noted some behavioral issues in high school but he denied any learning difficulties or need for academic accommodations, special education, or repeating grades.   Native Language: English  Education: Graduated high school  Occupation: Factory work, retail, food services. Stopped working 2 years ago in the context of his health (chronic pancreatitis). He is currently in appeals for the 5th time as he tries to get approval for disability benefits.     Psychosocial History  Born and raised in [place], [place]  Marital:   Children: None  Housing: Lives with his mother and 2 cats  Psychosocial support:  Pretty good  social support network including his family (mom, dad, brothers), and friends.    Family History  No  known immediate family history of dementia or neurological conditions     RESULTS    PSYCHOLOGICAL AND BEHAVIORAL: He endorsed moderate symptoms of depression (BDI=23) and moderate symptoms of anxiety (GERMÁN=24) on self-report questionnaires. On a self-report measure of personality and psychopathology, his responses were suggestive of valid and consistent responding. He endorsed multiple somatic complaints characterized by a general sense of malaise (poor health and feeling weak or tired), consistent with his medical history. He reported excessive worry including worries about misfortune and finances. He described a history of substance use, and described himself as engaging in instrumentally aggressive behavior, consistent with his report of behavioral challenges in his past.     BEHAVIORAL OBSERVATIONS  Alert, attentive and focused while undergoing clinical interview and questionnaires  Appearance: Well-groomed, casually dressed  Gait/Posture: Not assessed during this virtual visit  Sensorimotor: Not assessed during this virtual visit  Behavior: Cooperative, pleasant, no behavioral abnormalities noted  Speech: Fluent, articulate, normal rate, prosody, and volume; no conversational word finding difficulty  Thought process: Logical, linear, and goal-directed  Thought content: Logical, appropriate   Affect: Broad, responsive, consistent with reported mood; good eye contact  Mood: Euthymic  Insight and Judgment: Intact  Rapport: Easily established and maintained      This evaluation was conducted via a telehealth visit.  Video-Visit Details   Type of service:  Video Visit   Video End Time: 10:48  Originating Location (pt. Location): Home  Distant Location (provider location):  [facility]  Platform used for Video Visit: [employer]  Activities included in this evaluation: CPT Code #Units Time (min)   Psychiatric diagnostic interview 90038 1 --   Test evaluation services by professional; first  hour. 71276 1 95   Test evaluation services by professional, additional hour (+) 52794 1    Test administration and scoring by technician, first 30 mins 69430 1 68   Test administration and scoring by technician, additional 30 mins (+) 08617 1     F33.1

## 2025-03-24 ENCOUNTER — VIRTUAL VISIT (OUTPATIENT)
Dept: PHARMACY | Facility: CLINIC | Age: 40
End: 2025-03-24
Attending: PHYSICIAN ASSISTANT
Payer: COMMERCIAL

## 2025-03-24 VITALS — BODY MASS INDEX: 39.01 KG/M2 | HEIGHT: 72 IN | WEIGHT: 288 LBS

## 2025-03-24 DIAGNOSIS — E66.812 CLASS 2 OBESITY: Primary | ICD-10-CM

## 2025-03-24 DIAGNOSIS — E66.812 OBESITY, CLASS 2: ICD-10-CM

## 2025-03-24 RX ORDER — AMITRIPTYLINE HYDROCHLORIDE 75 MG/1
75 TABLET ORAL AT BEDTIME
COMMUNITY
Start: 2025-03-05

## 2025-03-24 RX ORDER — DOCUSATE SODIUM 100 MG/1
200 CAPSULE, LIQUID FILLED ORAL DAILY
COMMUNITY

## 2025-03-24 RX ORDER — MELATONIN 3 MG
6 CAPSULE ORAL AT BEDTIME
COMMUNITY

## 2025-03-24 ASSESSMENT — PAIN SCALES - GENERAL: PAINLEVEL_OUTOF10: MODERATE PAIN (4)

## 2025-03-24 NOTE — NURSING NOTE
Current patient location: [address] [place] [address] [address]  [place] [address]    Is the patient currently in the state of [place]? YES    Visit mode: VIDEO    If the visit is dropped, the patient can be reconnected by:VIDEO VISIT: Text to cell phone:   Telephone Information:   Mobile [phone]       Will anyone else be joining the visit? NO  (If patient encounters technical issues they should call [phone]    Are changes needed to the allergy or medication list? No    Are refills needed on medications prescribed by this physician? Discuss with provider    Rooming Documentation:  Questionnaire(s) completed    Reason for visit: Medication Therapy Management    Pt states 4/10 lower left abdominal pain--pancreatis per pt and chronic/baseline.    Gallo SANTILLANF

## 2025-03-24 NOTE — PATIENT INSTRUCTIONS
"Recommendations from today's MTM visit:                                                    MTM (medication therapy management) is a service provided by a clinical pharmacist designed to help you get the most of out of your medicines.   Today we reviewed what your medicines are for, how to know if they are working, that your medicines are safe and how to make your medicine regimen as easy as possible.        Continue topiramate 75 mg daily    Lab Scheduling:  ordered by Cassidy 2/20/25, vitamin B12 and Parathyroid hormone  Mercedes to verify which labs Cassidy would like completed  Lab orders are on file, please make an appointment to have them drawn at your convenience.   To schedule the Lab Appointment using [employer]:  Select \"Schedule an Appointment\"  Select \"Lab Only\"  For \"A couple of questions\", select \"Other\"  For \"Which locations work for you?, select the location and set up the appointment  To schedule by phone call [phone] to schedule a lab only appointment at any [facility] lab.    Start using Dexcom G7 CGM as planned    Keep up the great work!     Follow-up:  - Next MTM as needed and prior to bariatric surgery  - Vicarious message with scheduling tool      - Next provider visit:  4/3/25 Dr. Manas Stroud MD, 5/22/25 Cassidy Cruz PA-C       - Next Dietician visit:  4/2/25 Bre Reyes RD        It was great speaking with you today.  I value your experience and would be very thankful for your time in providing feedback in our clinic survey. In the next few days, you may receive an email or text message from [employer] with a link to a survey related to your  clinical pharmacist.\"     To schedule another MTM appointment, please call the clinic directly or you may call the MTM scheduling line at [phone] or toll-free at [phone].     My Clinical Pharmacist's contact information:                                                      Please feel free to contact me with any questions or " concerns you have.      Aftab SharpeD    Medication Therapy Management Pharmacist  [facility]

## 2025-03-24 NOTE — PROGRESS NOTES
"Medication Therapy Management (MTM) Encounter    ASSESSMENT:                            Medication Adherence/Access: No issues identified.    Weight Management /DM:  Abiel would benefit from continuing therapy with topiramate 75 mg once daily.  he is tolerating therapy, no reported side effects and is thus far effective for weight management (-9 lb). He just started 75 mg dose and therefore too soon to assess effectiveness overall, he is uncertain if he notices any change but is more mindful of what he is eating and appetite may be slightly less. He is motivated to lose weight to have bariatric and pancreatic surgery, goal weight is 240 lb.  He is receiving his Dexcom 7 sensor today, aware of how to use as was previously using Dexcom 6 but stopped a few months ago. Will verify with Cassidy Cruz PA-C which labs he is to complete as he had all that she ordered except parathyroid and vitamin B12 done early February. Education provided on continued dietary and behavioral modifications to optimize effectiveness of topiramate.     Hypertension /CHF:   At goal of <140/90 mmHg, most recent ejection fraction 55%. Monitors blood pressure at home twice daily, readings above goal if taking prior to medication, at goal if taken after medication.      Mental Health /Anxiety, Depression, and PTSD  Stable.     Pancreatitis/pancreatic pseudocyst :   Stable. Nausea improved significantly with stent placement summer 2024.     Constipation   Stable, could consider fiber therapy if needed.      Supplements   No concerns.     PLAN:                            Patient Plan:      Continue topiramate 75 mg daily    Lab Scheduling:  ordered by Cassidy 2/20/25, vitamin B12 and Parathyroid hormone  Mercedes to verify which labs Cassidy would like completed  Lab orders are on file, please make an appointment to have them drawn at your convenience.   To schedule the Lab Appointment using [employer]:  Select \"Schedule an Appointment\"  Select \"Lab " "Only\"  For \"A couple of questions\", select \"Other\"  For \"Which locations work for you?, select the location and set up the appointment  To schedule by phone call [phone] to schedule a lab only appointment at [facility] lab.    Start using Dexcom G7 CGM as planned    Keep up the great work!     Follow-up:  - Next MTM as needed and prior to bariatric surgery  - Man[employer] message with scheduling tool      - Next provider visit:  4/3/25 Dr. Manas Stroud MD, 5/22/25 Cassidy Cruz PA-C       - Next Dietician visit:  4/2/25 Bre Reyes RD        SUBJECTIVE/OBJECTIVE:                          Abiel Weeks is a 39 year old male seen for an initial visit. He was referred to me from Cassidy Cruz PA-C.      Reason for visit: comprehensive medication review and topiramate management    Allergies/ADRs: Reviewed in chart  Past Medical History: Reviewed in chart  Tobacco: He reports that he has quit smoking. His smoking use included cigarettes. He has never used smokeless tobacco.  Alcohol: not currently using/ never  Caffeine: rarely    Medication Adherence/Access:   - Patient takes medications directly from bottles, used to use pill boxes but was not helpful, sets alarms   - Patient takes medications 4 time(s) per day.   - Per patient, misses medication 0 time(s) per week.   - no concerns with medication cost, MA covers well    Weight Management /DM:  - Topiramate 75 mg once daily    - med start:  3/2024, wt at start: 297 lb (estimate)   - metformin 1,000 mg twice daily    - Dexcom G7, getting it today, was delayed     Started topiramate about 2-3 weeks ago, unsure of drowsiness (takes at bedtime), no tingling, no word finding / memory issues, no worsening nausea, no bowel changes, and no taste changes.  Thus far is not sure about effectiveness of topiramate, appetite maybe less, has lost some weight but could be due to being more aware of what he is eating. Needs to lose weight for bariatric and " pancreatic surgery, recalls goal of 240 lb.  Does have constipation and nausea but feels is related to pancreatitis and not metformin. Currently not checking blood sugars, Dexcom G7 comes today, feels he can figure it out as he was using G6 previously, not sure why he stopped.     Nutrition/Eating Habits:  eats a lot of greens / veggies as protein / fat does not agree with him, nutritionist recommends getting most his protein from shakes   - Hydration:  4 liters per day   Exercise/Activity: not addressed  Medications Tried/Failed:  - None.     - Last provider visit:  2/20/25 Cassidy Cruz PA-C    - Last dietician visit:  2/28/25   - Next provider visit:  4/3/25 Dr. Manas Stroud MD, 5/22/25 Cassidy Cruz PA-C    - Next Dietician visit:  4/2/25 Bre Reyes RD      Initial Consult Weight: 297 lb (2/20/25)     Current weight today: 288 lbs 0 oz  Cumulative Weight Loss: -9 lb, -3% from baseline    Wt Readings from Last 4 Encounters:   03/24/25 288 lb (130.6 kg)   02/28/25 297 lb (134.7 kg)   02/20/25 297 lb (134.7 kg)   02/06/25 297 lb (134.7 kg)     Hypertension /CHF:   - amlodipine 10 mg once daily   - carvedilol 50 mg twice daily   - hydralazine 100 mg three times daily   - isosorbide 90 mg ER once daily     Has enlarged heart / CHF, most recent ejection fraction was normal, his was 55%, he is happy with this.   Patient reports no current medication side effects, been taking for years and going well.   Patient self monitors blood pressure.  Home BP monitoring wrist monitor, 1-2 per day, 140-150 in the morning prior to meds .  After taking medication is 110-130 mmhg   BP Readings from Last 3 Encounters:   02/06/25 124/74   02/05/25 (!) 136/91   07/25/24 (!) 140/88      Mental Health /Anxiety, Depression, and PTSD  - Amitriptyline 75 mg once daily at bedtime   - Duloxetine 60 mg once daily morning     Patient reports minimal current medication side effects (sometimes weird nightmares with amitriptyline, slight  morning grogginess), for the most part feels is effective.  Patient is seeing a therapist (Purvi), PCP manages medications.      Pancreatitis/pancreatic pseudocyst :   - Creon 24k 2-3 capsules with meals and 1-2 with snacks, up to 15 / day   - has less bad days (was 2-3 / week), now 1-2 days / week  - ondansetron 4 mg ODT as needed    - takes less since stent surgery, now maybe once a month  - Lyrica 150 mg three times daily    - more effective then gabapentin, when ran out and went without a few days had significantly more pain    Had a butterfly stent placed between pancreatic cyst and stomach to drain cyst in summer 2024 and this has been very helpful.     Constipation   - docusate 200 mg at bedtime with glass of water  - Miralax 1 capful daily as needed    - 2-3 times per month, usually produces BM in a day or two    Current BM frequency 1 BM /day, can get to every 2-3 days (2-3 times per month, has stomach cramping/sharp pain on upper left side).    Patient feels that current therapy is effective.   Patient reports no current medication side effects.       Supplements   - Multivitamin daily  - melatonin 6 mg at bedtime     Feels melatonin is effective, no concerns.        Today's Vitals: Ht 6' (1.829 m)   Wt 288 lb (130.6 kg)   BMI 39.06 kg/m    ----------------    I spent 57 minutes with this patient today. All changes were made via collaborative practice agreement with Cassidy Cruz.     A summary of these recommendations was sent via [employer].    Mercedes Oviedo, PharmD    Medication Therapy Management Pharmacist  [facility]      Telemedicine Visit Details  The patient's medications can be safely assessed via a telemedicine encounter.  Type of service:  Video Conference via [employer]  Originating Location (pt. Location): Home    Distant Location (provider location):  Off-site  Start Time: 10:03 AM  End Time: 11:00 AM     Medication Therapy Recommendations  No medication  therapy recommendations to display

## 2025-03-24 NOTE — Clinical Note
Doing well on topiramate, no side effects, seeing numeric results, -9 lb, although he isn't sure he feels effects, maybe less appetite and more mindful with eating.  Dexcom 7 sensor coming today, will start.  You ordered various labs 2/20, most had been completed 2/6 except parathyroid and vitamin B12, would you like him to complete these two or do them all in the future? Dr. Manas Stroud MD 4/3, Cassidy 5/22, next MTM per your recommendation or pre surgery. - Mercedes UgaldeD, MTM

## 2025-04-02 ENCOUNTER — VIRTUAL VISIT (OUTPATIENT)
Dept: ENDOCRINOLOGY | Facility: CLINIC | Age: 40
End: 2025-04-02
Payer: COMMERCIAL

## 2025-04-02 VITALS — HEIGHT: 72 IN | WEIGHT: 280 LBS | BODY MASS INDEX: 37.93 KG/M2

## 2025-04-02 DIAGNOSIS — E11.22 TYPE 2 DIABETES MELLITUS WITH CHRONIC KIDNEY DISEASE, WITHOUT LONG-TERM CURRENT USE OF INSULIN, UNSPECIFIED CKD STAGE (H): ICD-10-CM

## 2025-04-02 DIAGNOSIS — E66.9 OBESITY: ICD-10-CM

## 2025-04-02 DIAGNOSIS — Z71.3 NUTRITIONAL COUNSELING: Primary | ICD-10-CM

## 2025-04-02 PROCEDURE — 97802 MEDICAL NUTRITION INDIV IN: CPT | Mod: 95

## 2025-04-02 PROCEDURE — 99207 PR NO CHARGE LOS: CPT | Mod: 95

## 2025-04-02 PROCEDURE — 1125F AMNT PAIN NOTED PAIN PRSNT: CPT | Mod: 95

## 2025-04-02 ASSESSMENT — PAIN SCALES - GENERAL: PAINLEVEL_OUTOF10: MODERATE PAIN (4)

## 2025-04-02 NOTE — PATIENT INSTRUCTIONS
Alejandro Beebe,     Follow-up with RD on May 13.     Thank you,    Bre Reyes, YISSEL, LD  If you would like to schedule or reschedule an appointment with the RD, please call [phone]    Nutrition Goals  Goals for today's visit:   1) Focus on getting at least 60 grams of protein daily.     GOALS:  Relating To Eating:  Eat slowly (20-30 minutes per meal), chewing foods well (25 chews per bite/applesauce consistency)  Eat 3 meals per day  Consume 60-90 g protein per day    Relating to beverages:  Reduce caffeine/carbonation/calorie containing beverages  Separate fluids from meals by 30 minutes before, during, and after eating  Drink 48-64 ounces of fluid per day    Relating to activity:  Increase activity as able    The Plate Method  [email or web address]    Protein Sources for Weight Loss  [email or web address]     Carbohydrates  [email or web address]     Mindful Eating  [email or web address]     Summary of Volumetrics Eating Plan  [email or web address]     Diet Guidelines after Weight Loss Surgery  [email or web address]     Seated Exercises for Arms and Legs (can be done before or after surgery)  [email or web address]    COMPREHENSIVE WEIGHT MANAGEMENT PROGRAM  VIRTUAL SUPPORT GROUPS    At [facility], our Comprehensive Weight Management program offers on-line support groups for patients who are working on weight loss and considering, preparing for, or have had weight loss surgery.     There is no cost for this opportunity.  You are invited to attend the?Virtual Support Groups?provided by any of the following locations:    [facility] via [employer] Teams with Roxanne Lanza RD, RN  2.   [place] via [employer] Teams with Otis Valles, PhD, LP  3.   [place] via [employer] Teams with Rosa Isela Wright RN  4.   [facility] via a Zoom Meeting with DEVON Fernandes-    The following Support Group information can also be found on our  website:  [email or web address]      [facility]   WEIGHT LOSS SURGERY SUPPORT GROUP  The support group is a patient-lead forum that meets monthly to share experiences, encouragement and education. It is open to those who have had weight loss surgery, are scheduled for surgery, or are considering surgery.   WHEN: 3rd Wednesday of each month from 5:00PM - 6:00PM using Microsoft Teams.   FACILITATOR: Led by Roxanne Palomo RD, LD, RN, the program's Clinical Coordinator.   TO REGISTER: Please contact the clinic via [employer] or call the nurse line directly at [phone] to inform our staff that you would like an invite sent to you and to let us know the email you would like the invite sent to. Prior to the meeting, a link with directions on how to join the meeting will be sent to you.    2025 Meetings, 3rd Wednesday, 5:00pm - 6:00pm    January 15: Let's Talk  February19: Let's Talk  March 19: Speaker: Santos Chandler RD, LD  April 16: Let's Talk  May 21: Speaker: Aide Angulo RD, LD  June18: Let's Talk  July 16: Let's Talk  August 20: Let's Talk  September 17: No meeting.  October 15: Speaker: Ninoska Acuña PsyD, LP  November 19: Let's Talk  December 17: Let's Talk    [facility] BARIATRIC CARE SUPPORT GROUP  This is open to all pre- and post- operative bariatric surgery patients as well as their support system.   WHEN: 3rd Tuesday of each month from 6:30 PM - 8:00 PM using Microsoft Teams.   FACILITATOR: Led by Otis Valles, Ph.D who is a Licensed Psychologist with the [facility] Comprehensive Weight Management Program.   TO REGISTER: Please send an email to Otis Valles, Ph.D., LP at?[email or web address]?if you would like an invitation to the group. Prior to the meeting, a link with directions on how to join the meeting will be sent to you.    2025 Meetings, 3rd  Tuesday January 21st: Open Forum  February 18th: Medications and Bariatric Surgery  Speaker: Laly Maldonado, [facility] Resident  March 18th: Open Forum  April 15th: Genetics of Obesity as well as Q&A, Speaker: Katelyn Bingham MD, [facility] Comprehensive Weight Management Program.  May 20th: Open Forum  June 17th: Nutritional Labeling, Speaker: ELE  July 15th: Open Forum  August 19th: Open Forum  September 16th: Open Forum  October 21st: Open Forum  November 18th: Holiday Eating, Speaker: TBTIM  December 16th: Open Forum    [facility] POST-OPERATIVE BARIATRIC SURGERY SUPPORT GROUP  This is a support group for [facility] bariatric patients (and those external to [facility]) who have had bariatric surgery and are at least 3 months post-surgery.  WHEN: 4th Thursday of the month from 11:00 AM - 12:00 PM using Microsoft Teams.   FACILITATOR: Led by Certified Bariatric Nurse, Rosa Isela Wright RN, CBN.   TO REGISTER: Please send an email to Rosa Isela at [email or web address] if you would like an invitation to the group.  Prior to the meeting, a link with directions on how to join the meeting will be sent to you.    2025 Meetings, 4th Thursday, 11:00am - 12:pm  January 23  February 27  March 27  April 24  May 22  Majo 26  July 24  August 28  September 25  October 23  November 27: Thanksgiving Day, No meeting.      December 25: She[place]on Day, No meeting.        [facility]   HEALTHY LIFESTYLE COACHING GROUP  This is a 60 minute virtual coaching group for those who want to lead a healthier lifestyle. Come together to set goals and overcome barriers in a supportive group environment. We will address the four pillars of health: nutrition, exercise, sleep, and emotional well-being.   WHEN: 1st Friday of the month, 12:30 PM - 1:30 PM   using a Zoom meeting.     FACILITATOR: Led by National Board-Certified  Health and , Rosa Isela Pastor, [facility].  TO REGISTER: Please call the [employer] at [phone] to register. You will get an appointment to attend in [place]. Fifteen minutes prior to the meeting, complete the e-check in and you will get the link to join the meeting.  There is no charge to attend this group and space is limited.  Please register for each month you wish to attend    2025 Meetings, 1st Friday, 12:30pm-1:30pm  January 3  February 7  March 7: No meeting.  April 4  May 2  Majo 6  July 4: Fourth of July Holiday, No meeting.    August 1  September 5  October 3  November 7  December 5

## 2025-04-02 NOTE — LETTER
4/2/2025       RE: Abiel Weeks  [address]  [place] [address]     Dear Colleague,    Thank you for referring your patient, Abiel Weeks, to the [facility] [place] at [facility]. Please see a copy of my visit note below.    Video-Visit Details    Type of service:  Video Visit    Video Start Time: 11:28 AM    Video End Time: 11:50 AM     Originating Location (pt. Location): Home    Distant Location (provider location):  Offsite (providers home) [facility] [place]     Platform used for Video Visit: [employer]    New Bariatric Nutrition Consultation Note    Reason For Visit: Nutrition Assessment    Abeil Weeks is a 39 year old presenting today for new bariatric nutrition consult.   Patient is interested in weight loss surgery with Dr. Stroud  expected surgery in D.  Patient is accompanied by self.  This is patient's 3rd of 3 required nutrition visits prior to surgery. Completed Weight Loss Surgery Nutrition Class on 2/28/25.    Working towards weight loss prior to possible pancreatectomy.     Pt referred by MYKE Segura on April 2, 2025.    CO-MORBIDITIES OF OBESITY INCLUDE:        2/19/2025    10:22 PM   --   I have the following health issues associated with obesity Type II Diabetes    Heart Disease    High Blood Pressure    Sleep Apnea    GERD (Reflux)       SUPPORT:      2/19/2025    10:22 PM   Support System Reviewed With Patient   Who do you have in your support network that can be available to help you for the first 2 weeks after surgery? My mother.   Who can you count on for support throughout your weight loss surgery journey? My mother, and the rest of my family.       ANTHROPOMETRICS:  Initial consult weight: 297 lb on 2/20/25   Estimated body mass index is 37.97 kg/m  as calculated from the following:    Height as of this encounter: 1.829 m (6'  "0.01\").    Weight as of this encounter: 127 kg (280 lb).  Current Weight: 280 lb per patient report.      Required weight loss goal pre-op: -10 lbs from initial consult weight (goal weight 287 lbs or less before surgery) - met         2/19/2025    10:22 PM   --   I have tried the following methods to lose weight Watching portions or calories    Physician directed program           2/19/2025    10:22 PM   Weight Loss Questions Reviewed With Patient   How long have you been overweight? Since late 20's to early 40's       MEDICATION FOR WEIGHT LOSS: Metformin   Not a candidate for GLP1 due to hx of pancreatitis   Topiramate - 75 mg daily - eating less, thoughts around food have diminished     VITAMIN/MINERAL SUPPLEMENTS: ROHIT Narvaez    NUTRITION HISTORY:  Food allergies: None   Food intolerances: citrus - can cause GI intolerance   Previous experience with diet modification for weight loss: trying to make mindful choices.   Barriers to lifestyle change: hard for patient to exercise due to chronic pancreatitis.     Trying to portion control more. Making more mindful choices with eating. Follows a low fat/low sodium diet.     Patient avoids meat due to it being a trigger food.     Recent Diet Recall:  Breakfast: Doesn't feel well in the morning when he wakes up. Tries to force himself to have a protein shake (this is protein powder added to skim milk).   Lunch: Cereal with skim milk OR vegetables (celery, carrots, broccoli)   Dinner: Cereal with skim milk OR vegetables (celery, carrots, broccoli)   Snacks: Triscuits, pretzels   Beverages: mostly water, once a week will have a diet soda  Alcohol: avoids alcohol completely         2/19/2025    10:22 PM   Recall Diet Questions Reviewed With Patient   Describe what you typically consume for breakfast (typical or most recent) Cereal   Describe what you typically consume for lunch (typical or most recent) Some kind of vegetable with light ranch.   Describe what you typically " consume for supper (typical or most recent) Tonight I had an Ensure for dinner.  It kind of varies depending on how my pancreatitis is.   Describe what you typically consume as snacks (typical or most recent) Pretzels, low fat triscuits, low sugar popsicles.   How many ounces of water, or other low calorie drinks, do you drink daily (8 oz=1 glass)? 64 oz or more   How many ounces of caffeine (coffee, tea, pop) do you drink daily (8 oz=1 glass)? 8 oz   How many ounces of carbonated (pop, beer, sparkling water) drinks do you drinky daily (8 oz=1 glass)? 8 oz   How many ounces of juice, pop, sweet tea, sports drinks, protein drinks, other sweetened drinks, do you drink daily (8 oz=1 glass)? 16 oz   How many ounces of milk do you drink daily (8 oz=1 glass) 16 oz   Please indicate the type of milk 1%    skim   How often do you drink alcohol? Never           2/19/2025    10:22 PM   Eating Habits   What foods do you crave? I'd love a cheeseburger but with my current health issues that would be unwise.           2/19/2025    10:22 PM   Dining Out History Reviewed With Patient   How often do you dine out? Rarely.   Where do you dine out? (select all that apply) sit-down restaurants   What types of food do you order when you dine out? Usually I have a hard time finding something that agrees with my diet.  I order a salad or vegetables.     EXERCISE:  Gets up and moves around throughout the day if able to.         2/19/2025    10:22 PM   --   How often do you exercise? Less than 1 time per week   What is the duration of your exercise (in minutes)? 10 Minutes   What types of exercise do you do? walking   What keeps you from being more active? I am as active as I can possbily be    Pain     ADDITIONAL INFORMATION:  Mother is a good support for patient.     NUTRITION DIAGNOSIS:  Obesity r/t long history of positive energy balance aeb BMI >30 kg/m2.    INTERVENTION:  Intervention Provided/Education Provided on post-op diet  guidelines, vitamins/minerals essential post-operatively, GI anatomy of bariatric surgeries, ways to help prepare for post-op diet guidelines pre-operatively, portion/calorie-control, mindful eating and sources of protein.  Patient demonstrates understanding.     Personal barriers to making and continuing required life changes have been identified, and strategies to overcome those barriers have been recommended AND family and social supports have been assessed and strategies to strengthen those supports have been recommended.    Provided pt with list of goals, RD contact information and resources listed below via [email or web address].             2/19/2025    10:22 PM   Questions Reviewed With Patient   How ready are you to make changes regarding your weight? Number 1 = Not ready at all to make changes up to 10 = very ready. 8   How confident are you that you can change? 1 = Not confident that you will be successful making changes up to 10 = very confident. 8     Expected Engagement: fair-good    Goals for today's visit:   1) Focus on getting at least 60 grams of protein daily.     GOALS:  Relating To Eating:  Eat slowly (20-30 minutes per meal), chewing foods well (25 chews per bite/applesauce consistency)  Eat 3 meals per day  Consume 60-90 g protein per day    Relating to beverages:  Reduce caffeine/carbonation/calorie containing beverages  Separate fluids from meals by 30 minutes before, during, and after eating  Drink 48-64 ounces of fluid per day    Relating to activity:  Increase activity as able    The Plate Method  [email or web address]    Protein Sources for Weight Loss  [email or web address]     Carbohydrates  [email or web address]     Mindful Eating  [email or web address]     Summary of Volumetrics Eating Plan  [email or web address]     Diet Guidelines after Weight Loss Surgery  [email or web address]     Seated Exercises for Arms and Legs (can be done before or  after surgery)  [email or web address]    Follow Up: May 13.     Time spent with patient: 22 minutes.  Bre Reyes RD, LD      Again, thank you for allowing me to participate in the care of your patient.      Sincerely,    Bre Reyes RD

## 2025-04-02 NOTE — NURSING NOTE
Current patient location: [address] [place] [address] [address]  [place] [address]    Is the patient currently in the state of [place]? YES    Visit mode: VIDEO    If the visit is dropped, the patient can be reconnected by:VIDEO VISIT: Text to cell phone:   Telephone Information:   Mobile [phone]    and VIDEO VISIT: Send to e-mail at: [email or web address]    Will anyone else be joining the visit? NO  (If patient encounters technical issues they should call [phone]    Are changes needed to the allergy or medication list? N/A    Are refills needed on medications prescribed by this physician? NO    Rooming Documentation:  Questionnaire(s) not pre-assigned    Reason for visit: Consult    Yesenia TOTH

## 2025-04-02 NOTE — PROGRESS NOTES
"Video-Visit Details    Type of service:  Video Visit    Video Start Time: 11:28 AM    Video End Time: 11:50 AM     Originating Location (pt. Location): Home    Distant Location (provider location):  Offsite (providers home) [facility] [place]     Platform used for Video Visit: Sheri    New Bariatric Nutrition Consultation Note    Reason For Visit: Nutrition Assessment    Abiel Weeks is a 39 year old presenting today for new bariatric nutrition consult.   Patient is interested in weight loss surgery with Dr. Stroud  expected surgery in TBD.  Patient is accompanied by self.  This is patient's 3rd of 3 required nutrition visits prior to surgery. Completed Weight Loss Surgery Nutrition Class on 2/28/25.    Working towards weight loss prior to possible pancreatectomy.     Pt referred by MYKE Segura on April 2, 2025.    CO-MORBIDITIES OF OBESITY INCLUDE:        2/19/2025    10:22 PM   --   I have the following health issues associated with obesity Type II Diabetes    Heart Disease    High Blood Pressure    Sleep Apnea    GERD (Reflux)       SUPPORT:      2/19/2025    10:22 PM   Support System Reviewed With Patient   Who do you have in your support network that can be available to help you for the first 2 weeks after surgery? My mother.   Who can you count on for support throughout your weight loss surgery journey? My mother, and the rest of my family.       ANTHROPOMETRICS:  Initial consult weight: 297 lb on 2/20/25   Estimated body mass index is 37.97 kg/m  as calculated from the following:    Height as of this encounter: 1.829 m (6' 0.01\").    Weight as of this encounter: 127 kg (280 lb).  Current Weight: 280 lb per patient report.      Required weight loss goal pre-op: -10 lbs from initial consult weight (goal weight 287 lbs or less before surgery) - met         2/19/2025    10:22 PM   --   I have tried the following methods to lose weight Watching portions or " calories    Physician directed program           2/19/2025    10:22 PM   Weight Loss Questions Reviewed With Patient   How long have you been overweight? Since late 20's to early 40's       MEDICATION FOR WEIGHT LOSS: Metformin   Not a candidate for GLP1 due to hx of pancreatitis   Topiramate - 75 mg daily - eating less, thoughts around food have diminished     VITAMIN/MINERAL SUPPLEMENTS: ROHIT Narvaez    NUTRITION HISTORY:  Food allergies: None   Food intolerances: citrus - can cause GI intolerance   Previous experience with diet modification for weight loss: trying to make mindful choices.   Barriers to lifestyle change: hard for patient to exercise due to chronic pancreatitis.     Trying to portion control more. Making more mindful choices with eating. Follows a low fat/low sodium diet.     Patient avoids meat due to it being a trigger food.     Recent Diet Recall:  Breakfast: Doesn't feel well in the morning when he wakes up. Tries to force himself to have a protein shake (this is protein powder added to skim milk).   Lunch: Cereal with skim milk OR vegetables (celery, carrots, broccoli)   Dinner: Cereal with skim milk OR vegetables (celery, carrots, broccoli)   Snacks: Triscuits, pretzels   Beverages: mostly water, once a week will have a diet soda  Alcohol: avoids alcohol completely         2/19/2025    10:22 PM   Recall Diet Questions Reviewed With Patient   Describe what you typically consume for breakfast (typical or most recent) Cereal   Describe what you typically consume for lunch (typical or most recent) Some kind of vegetable with light ranch.   Describe what you typically consume for supper (typical or most recent) Tonight I had an Ensure for dinner.  It kind of varies depending on how my pancreatitis is.   Describe what you typically consume as snacks (typical or most recent) Pretzels, low fat triscuits, low sugar popsicles.   How many ounces of water, or other low calorie drinks, do you drink daily (8  oz=1 glass)? 64 oz or more   How many ounces of caffeine (coffee, tea, pop) do you drink daily (8 oz=1 glass)? 8 oz   How many ounces of carbonated (pop, beer, sparkling water) drinks do you drinky daily (8 oz=1 glass)? 8 oz   How many ounces of juice, pop, sweet tea, sports drinks, protein drinks, other sweetened drinks, do you drink daily (8 oz=1 glass)? 16 oz   How many ounces of milk do you drink daily (8 oz=1 glass) 16 oz   Please indicate the type of milk 1%    skim   How often do you drink alcohol? Never           2/19/2025    10:22 PM   Eating Habits   What foods do you crave? I'd love a cheeseburger but with my current health issues that would be unwise.           2/19/2025    10:22 PM   Dining Out History Reviewed With Patient   How often do you dine out? Rarely.   Where do you dine out? (select all that apply) sit-down restaurants   What types of food do you order when you dine out? Usually I have a hard time finding something that agrees with my diet.  I order a salad or vegetables.     EXERCISE:  Gets up and moves around throughout the day if able to.         2/19/2025    10:22 PM   --   How often do you exercise? Less than 1 time per week   What is the duration of your exercise (in minutes)? 10 Minutes   What types of exercise do you do? walking   What keeps you from being more active? I am as active as I can possbily be    Pain     ADDITIONAL INFORMATION:  Mother is a good support for patient.     NUTRITION DIAGNOSIS:  Obesity r/t long history of positive energy balance aeb BMI >30 kg/m2.    INTERVENTION:  Intervention Provided/Education Provided on post-op diet guidelines, vitamins/minerals essential post-operatively, GI anatomy of bariatric surgeries, ways to help prepare for post-op diet guidelines pre-operatively, portion/calorie-control, mindful eating and sources of protein.  Patient demonstrates understanding.     Personal barriers to making and continuing required life changes have been  identified, and strategies to overcome those barriers have been recommended AND family and social supports have been assessed and strategies to strengthen those supports have been recommended.    Provided pt with list of goals, RD contact information and resources listed below via [employer].             2/19/2025    10:22 PM   Questions Reviewed With Patient   How ready are you to make changes regarding your weight? Number 1 = Not ready at all to make changes up to 10 = very ready. 8   How confident are you that you can change? 1 = Not confident that you will be successful making changes up to 10 = very confident. 8     Expected Engagement: fair-good    Goals for today's visit:   1) Focus on getting at least 60 grams of protein daily.     GOALS:  Relating To Eating:  Eat slowly (20-30 minutes per meal), chewing foods well (25 chews per bite/applesauce consistency)  Eat 3 meals per day  Consume 60-90 g protein per day    Relating to beverages:  Reduce caffeine/carbonation/calorie containing beverages  Separate fluids from meals by 30 minutes before, during, and after eating  Drink 48-64 ounces of fluid per day    Relating to activity:  Increase activity as able    The Plate Method  [email or web address]    Protein Sources for Weight Loss  [email or web address]     Carbohydrates  [email or web address]     Mindful Eating  [email or web address]     Summary of Volumetrics Eating Plan  [email or web address]     Diet Guidelines after Weight Loss Surgery  [email or web address]     Seated Exercises for Arms and Legs (can be done before or after surgery)  [email or web address]    Follow Up: May 13.     Time spent with patient: 22 minutes.  Bre Reyes RD, LD

## 2025-04-03 ENCOUNTER — CARE COORDINATION (OUTPATIENT)
Dept: ENDOCRINOLOGY | Facility: CLINIC | Age: 40
End: 2025-04-03

## 2025-04-03 ENCOUNTER — VIRTUAL VISIT (OUTPATIENT)
Dept: ENDOCRINOLOGY | Facility: CLINIC | Age: 40
End: 2025-04-03
Payer: COMMERCIAL

## 2025-04-03 VITALS — BODY MASS INDEX: 37.93 KG/M2 | WEIGHT: 280 LBS | HEIGHT: 72 IN

## 2025-04-03 DIAGNOSIS — E66.01 MORBID OBESITY WITH BMI OF 40.0-44.9, ADULT (H): Primary | ICD-10-CM

## 2025-04-03 ASSESSMENT — PAIN SCALES - GENERAL: PAINLEVEL_OUTOF10: MODERATE PAIN (4)

## 2025-04-03 NOTE — NURSING NOTE
(   Chief Complaint   Patient presents with    New Patient     Meet and greet    )    ( Weight: 127 kg (280 lb) )  ( Height: 182.9 cm (6') )  ( BMI (Calculated): 37.97 )  (   )  (   )  (   )  (   )  (   )  (   )    (   )  (   )  (   )  (   )  (   )  (   )  (   )    (   Patient Active Problem List   Diagnosis    Chronic pain disorder    Chronic recurrent pancreatitis (H)    Elevated transaminase level    Essential hypertension    NOAH (generalized anxiety disorder)    Hyperparathyroidism    Moderate episode of recurrent major depressive disorder (H)    Nonischemic cardiomyopathy (H)    KAYLA (obstructive sleep apnea)    Pancreatic pseudocyst    Type 2 diabetes mellitus with chronic kidney disease, without long-term current use of insulin, unspecified CKD stage (H)    Alcohol use disorder, severe, in sustained remission (H)    )  (   Current Outpatient Medications   Medication Sig Dispense Refill    acetaminophen (TYLENOL) 500 MG tablet Take 1,000 mg by mouth every 8 hours as needed      albuterol (PROAIR HFA/PROVENTIL HFA/VENTOLIN HFA) 108 (90 Base) MCG/ACT inhaler Inhale 2 puffs into the lungs every 4 hours as needed for shortness of breath, wheezing or cough      alum & mag hydroxide-simethicone (CONSTANTINO-LANTA) 200-200-20 MG/5ML SUSP suspension Take 15 mLs by mouth every 4 hours as needed.      amitriptyline (ELAVIL) 75 MG tablet Take 75 mg by mouth at bedtime.      amLODIPine (NORVASC) 10 MG tablet Take 1 tablet by mouth daily      carvedilol (COREG) 25 MG tablet Take 50 mg by mouth 2 times daily (with meals)      Continuous Glucose Sensor (DEXCOM G7 SENSOR) MISC Change every 10 days. 3 each 5    docusate sodium (COLACE) 100 MG capsule Take 200 mg by mouth daily.      DULoxetine (CYMBALTA) 60 MG capsule Take 60 mg by mouth daily      hydrALAZINE (APRESOLINE) 100 MG tablet Take 100 mg by mouth 3 times daily      isosorbide mononitrate (IMDUR) 30 MG 24 hr tablet Take 90 mg by mouth at bedtime      lipase-protease-amylase  (CREON) 89040-84639-481447 units CPEP per EC capsule Take 2-3 with meals / 1-2 with snacks, up to 15 per day. 450 capsule 6    medical cannabis (Patient's own supply) See Admin Instructions. (The purpose of this order is to document that the patient reports taking medical cannabis.  This is not a prescription, and is not used to certify that the patient has a qualifying medical condition.)      melatonin 3 MG CAPS Take 6 mg by mouth at bedtime.      metFORMIN (GLUCOPHAGE) 1000 MG tablet Take 1,000 mg by mouth 2 times daily (with meals)      Multiple Vitamins-Minerals (SUPER THERA TOÑO M) TABS Take 1 tablet by mouth daily      ondansetron (ZOFRAN ODT) 4 MG ODT tab Place 4 mg under the tongue every 8 hours as needed      pantoprazole (PROTONIX) 40 MG EC tablet Take 40 mg by mouth 2 times daily (with meals)      polyethylene glycol (MIRALAX) 17 g packet Take 17 g by mouth daily as needed      pregabalin (LYRICA) 150 MG capsule Take 1 capsule by mouth 3 times daily      topiramate (TOPAMAX) 25 MG tablet 25mg at bedtime for week 1, 50mg at bedtime for 1 week, and 75mg at bedtime thereafter 90 tablet 3    )  ( Diabetes Eval:    )    ( Pain Eval:  Moderate Pain (4) )    ( Wound Eval:       )    (   History   Smoking Status    Former    Types: Cigarettes   Smokeless Tobacco    Never    )    ( Signed By:  Marky Conti, JACOB; April 3, 2025; 12:07 PM )

## 2025-04-03 NOTE — PROGRESS NOTES
New Bariatric Patient Class    Abiel Weeks attended the New Consult WLS class today.     Patient's current comorbidities include:  Patient Active Problem List   Diagnosis    Chronic pain disorder    Chronic recurrent pancreatitis (H)    Elevated transaminase level    Essential hypertension    NOAH (generalized anxiety disorder)    Hyperparathyroidism    Moderate episode of recurrent major depressive disorder (H)    Nonischemic cardiomyopathy (H)    KAYLA (obstructive sleep apnea)    Pancreatic pseudocyst    Type 2 diabetes mellitus with chronic kidney disease, without long-term current use of insulin, unspecified CKD stage (H)    Alcohol use disorder, severe, in sustained remission (H)       Current Outpatient Medications   Medication Sig Dispense Refill    acetaminophen (TYLENOL) 500 MG tablet Take 1,000 mg by mouth every 8 hours as needed      albuterol (PROAIR HFA/PROVENTIL HFA/VENTOLIN HFA) 108 (90 Base) MCG/ACT inhaler Inhale 2 puffs into the lungs every 4 hours as needed for shortness of breath, wheezing or cough      alum & mag hydroxide-simethicone (CONSTANTINO-LANTA) 200-200-20 MG/5ML SUSP suspension Take 15 mLs by mouth every 4 hours as needed.      amitriptyline (ELAVIL) 75 MG tablet Take 75 mg by mouth at bedtime.      amLODIPine (NORVASC) 10 MG tablet Take 1 tablet by mouth daily      carvedilol (COREG) 25 MG tablet Take 50 mg by mouth 2 times daily (with meals)      Continuous Glucose Sensor (DEXCOM G7 SENSOR) MISC Change every 10 days. 3 each 5    docusate sodium (COLACE) 100 MG capsule Take 200 mg by mouth daily.      DULoxetine (CYMBALTA) 60 MG capsule Take 60 mg by mouth daily      hydrALAZINE (APRESOLINE) 100 MG tablet Take 100 mg by mouth 3 times daily      isosorbide mononitrate (IMDUR) 30 MG 24 hr tablet Take 90 mg by mouth at bedtime      lipase-protease-amylase (CREON) 84694-53632-181796 units CPEP per EC capsule Take 2-3 with meals / 1-2 with snacks, up to 15 per day. 450 capsule 6     medical cannabis (Patient's own supply) See Admin Instructions. (The purpose of this order is to document that the patient reports taking medical cannabis.  This is not a prescription, and is not used to certify that the patient has a qualifying medical condition.)      melatonin 3 MG CAPS Take 6 mg by mouth at bedtime.      metFORMIN (GLUCOPHAGE) 1000 MG tablet Take 1,000 mg by mouth 2 times daily (with meals)      Multiple Vitamins-Minerals (SUPER THERA TOÑO M) TABS Take 1 tablet by mouth daily      ondansetron (ZOFRAN ODT) 4 MG ODT tab Place 4 mg under the tongue every 8 hours as needed      pantoprazole (PROTONIX) 40 MG EC tablet Take 40 mg by mouth 2 times daily (with meals)      polyethylene glycol (MIRALAX) 17 g packet Take 17 g by mouth daily as needed      pregabalin (LYRICA) 150 MG capsule Take 1 capsule by mouth 3 times daily      topiramate (TOPAMAX) 25 MG tablet 25mg at bedtime for week 1, 50mg at bedtime for 1 week, and 75mg at bedtime thereafter 90 tablet 3       The following items were discussed during class:      In-Clinic Weight:  Patient to schedule an in-person weight if initial visit was not in person.  Patient can schedule with  clinic to have a weight on Body Composition Scale or have in person weight at PCP.  Weight must be documented in chart.    Labs: Patient reminded to schedule an appointment to have initial labs done if not already done.    Clearances: Discussed process to obtain clearance letters.  Notified that patient is responsible for  appointments and any required specialty testing.  Patient has been given a list of providers to contact for Psych Clearance.  Informed that Psych Clearance is to be done by someone other than current psychiatrist/psychologist/therapist.    Dietician visits: Discussed with patient to schedule all required preop dietician visits.  Informed that missed appointments can delay receiving a surgery date and insurance company may  require patient to start back at first visit.      Nutrition/Intake Modification: Discussed nutrition changes to start making now that will help with postop success.  Decrease portion sizes.  Read labels and portion out food according to the portion on the container/box.  Use measuring cups and counting to determine correct portions to put on plate.    Take time to read the nutrition information for recipes.  Look for hidden carbohydrates that may be sabotaging weight loss.  Instructed to work on getting in at least 60 grams of protein daily - roughly 20 grams per meal.  Instructed to decrease/eliminate carbs in the form of chips, crackers, pasta, rice, pancakes, waffles, bagels, and white potatoes.  Replace carbohydrates with healthy vegetables.  Increase fluid intake/water intake to at least 64 ounces each day.  Fluids are to be caffeine-free/carbonation-free/calorie-free.  Instructed to take at least 30 minutes to eat meals.  Instructed to be mindful of your meal when eating.  Chew food well.  Savor the flavors and textures and be mindful of current mood.    Mental Health:  Take a look at past and current relationship with food.  Instructed to work with a therapist/counselor regarding emotional eating and triggers and discuss your mental health as it relates to food and food intake..  Patients who do not have a current support system are reminded how important a strong support system is during the preop and postop process for surgery.  Instructed to look at how food prep will be different after surgery  Instructed to look at how patient will put healthy food boundaries in place preop and postop.   Instructed to attend at least one bariatric support group preoperatively.  Discussed positivity and positive self-talk.  Discussed journaling and mindfulness activities to help with emotional eating.    Activity:  Discussed the importance of exercise preoperatively as it relates to weight loss, healthy muscles & bones,  and decreases chances of blood clots.  Encouraged to start slow and have small goals.  Discussed ways to exercise if patient has mobility issues, start with chair-based exercises and work your way up to standing exercises.  Patient directed where to find chair-based exercise videos.    8. Support Group Information  Patient provided the link for Support Group Information: [email or web address]    9.  Follow-up Appointments    Discussed follow-up appointment schedule:  Preop  Every three months, or as directed, with RINKU (PA or NP)  Surgeon Meet and Greet within 6 months from surgery.  Usually about 1-2 months after initial visit with RINKU  Dietician visits monthly until surgery  MTM Pharmacist, if designated by RINKU  Specialty Appointments as designated by Task List  Preop Nurse visit, once a surgery date is assigned  Postop  1 week postop, in person.  If the patient lives far away, must have vital signs and weigh in at PCP before 1 week appt.  31+ days postop  3 months postop with labs  6 months postop  12 months postop with labs  Annually with labs    All questions answered.  Patient instructed to contact the office for any questions and to notify office of any updates/clearances completed.

## 2025-04-03 NOTE — LETTER
"4/3/2025       RE: Abiel Weeks  [address]  [place] [address]     Dear Colleague,    Thank you for referring your patient, Abiel Weeks, to the [facility]. Please see a copy of my visit note below.    Dear Brandon Mclaughlin,      Referring provider:       2/19/2025    10:22 PM   --   Who referred you Dr. Montes     I was asked to see the patient regarding obesity by the referring provider above.    I had the pleasure of meeting with your patient Abiel Weeks in our weight loss surgery office.  This patient is a 39 year old male who has been undergoing our thorough preoperative screening process in anticipation of potential bariatric surgery.    At initial evaluation we recorded Abiel Weeks's height of 6' 0\", a weight of 297 lbs 0 oz, and calculated body mass index of 40.3 kg/m .      Full intake/assessment was done to determine barriers to weight loss success and develop a treatment plan.  Lost 160 lbs when first getting sick with pancreatitis [id number].  Gained 30 lbs in the last 6 months.     He has been unsuccessful with other methods of permanent weight loss and suffers from multiple weight related medical conditions.  Due to lack of success in achieving weight loss through other methods, he is interested in undergoing bariatric surgery.    Has need for TPIAT therapy for abdominal pain; sees chronic pancreatitis team.    We reviewed and compared outcomes of sleeve gastrectomy and ESG, endoscopic sleeve gastroplasty.    The outcomes of sleeve gastrectomy support weight loss above 20% with one year outcomes typically ranging from 25-30% TWL.  ESG studies have weight loss of 13% in the randomized clinical trial and variable amounts reported in other trials with lower follow-up rates.      PREVIOUS WEIGHT LOSS ATTEMPTS:      2/19/2025    10:22 PM   --   I have " tried the following methods to lose weight Watching portions or calories    Physician directed program       CO-MORBIDITIES OF OBESITY INCLUDE:      2/19/2025    10:22 PM   --   I have the following health issues associated with obesity Type II Diabetes    Heart Disease    High Blood Pressure    Sleep Apnea    GERD (Reflux)       VITALS:  Ht 1.829 m (6')   Wt 127 kg (280 lb)   BMI 37.97 kg/m      PE:  GENERAL: Alert and oriented x3. NAD  HEENT exam: Sclerae not icteric. Hearing good. Head normocephalic and atraumatic.   CARDIOVASCULAR: No JVD  RESPIRATORY: Breathing unlabored  GASTROINTESTINAL: Obese  LOWER EXTREMITIES: no deformities  MUSCULOSKELETAL: Normal gait, Moves all 4 extremities equal and strong  NEUROLOGIC: no gross defect  SKIN: warm and dry to touch     In summary, he has undergone an appropriate medical evaluation, dietitian evaluation, as well as psychologic screening. The patient appears to be an appropriate candidate for bariatric surgery.    In the office today, I discussed the laparoscopic gastric sleeve surgery.  Risks, benefits and anticipated outcomes were outlined including the risk of death, staple line leak (1-2%), PE, DVT, ulcer, worsening GERD, N/V, stricture, hernia, wound infection, weight regain, and vitamin deficiencies. This patient has a good chance of sustaining permanent weight loss due to this procedure.  This should also allow improvement if not resolution of his/her weight related medical conditions.    At present we are going to present your patient's file for prior authorization to insurance.  Pending prior authorization, I anticipate a surgical date in the near future.  We will keep you updated on any progress.  If you have questions regarding care please feel free to contact me.  Total time spent in the clinic was 30 minutes with greater than 50% in face-to-face consultation.        Sincerely,    Manas Stroud MD    Please route or send letter to:  Primary Care  Provider (PCP) and Referring Provider      Again, thank you for allowing me to participate in the care of your patient.      Sincerely,    Manas Stroud MD

## 2025-04-03 NOTE — PROGRESS NOTES
"Dear Brandon Mclaughlin,      Referring provider:       2/19/2025    10:22 PM   --   Who referred you Dr. Montes     I was asked to see the patient regarding obesity by the referring provider above.    I had the pleasure of meeting with your patient Abiel Weeks in our weight loss surgery office.  This patient is a 39 year old male who has been undergoing our thorough preoperative screening process in anticipation of potential bariatric surgery.    At initial evaluation we recorded Abiel Weeks's height of 6' 0\", a weight of 297 lbs 0 oz, and calculated body mass index of 40.3 kg/m .      Full intake/assessment was done to determine barriers to weight loss success and develop a treatment plan.  Lost 160 lbs when first getting sick with pancreatitis [id number].  Gained 30 lbs in the last 6 months.     He has been unsuccessful with other methods of permanent weight loss and suffers from multiple weight related medical conditions.  Due to lack of success in achieving weight loss through other methods, he is interested in undergoing bariatric surgery.    Has need for TPIAT therapy for abdominal pain; sees chronic pancreatitis team.    We reviewed and compared outcomes of sleeve gastrectomy and ESG, endoscopic sleeve gastroplasty.    The outcomes of sleeve gastrectomy support weight loss above 20% with one year outcomes typically ranging from 25-30% TWL.  ESG studies have weight loss of 13% in the randomized clinical trial and variable amounts reported in other trials with lower follow-up rates.      PREVIOUS WEIGHT LOSS ATTEMPTS:      2/19/2025    10:22 PM   --   I have tried the following methods to lose weight Watching portions or calories    Physician directed program       CO-MORBIDITIES OF OBESITY INCLUDE:      2/19/2025    10:22 PM   --   I have the following health issues associated with obesity Type II Diabetes    Heart Disease    High Blood Pressure    Sleep Apnea    GERD (Reflux) "       VITALS:  Ht 1.829 m (6')   Wt 127 kg (280 lb)   BMI 37.97 kg/m      PE:  GENERAL: Alert and oriented x3. NAD  HEENT exam: Sclerae not icteric. Hearing good. Head normocephalic and atraumatic.   CARDIOVASCULAR: No JVD  RESPIRATORY: Breathing unlabored  GASTROINTESTINAL: Obese  LOWER EXTREMITIES: no deformities  MUSCULOSKELETAL: Normal gait, Moves all 4 extremities equal and strong  NEUROLOGIC: no gross defect  SKIN: warm and dry to touch     In summary, he has undergone an appropriate medical evaluation, dietitian evaluation, as well as psychologic screening. The patient appears to be an appropriate candidate for bariatric surgery.    In the office today, I discussed the laparoscopic gastric sleeve surgery.  Risks, benefits and anticipated outcomes were outlined including the risk of death, staple line leak (1-2%), PE, DVT, ulcer, worsening GERD, N/V, stricture, hernia, wound infection, weight regain, and vitamin deficiencies. This patient has a good chance of sustaining permanent weight loss due to this procedure.  This should also allow improvement if not resolution of his/her weight related medical conditions.    At present we are going to present your patient's file for prior authorization to insurance.  Pending prior authorization, I anticipate a surgical date in the near future.  We will keep you updated on any progress.  If you have questions regarding care please feel free to contact me.  Total time spent in the clinic was 30 minutes with greater than 50% in face-to-face consultation.        Sincerely,    Manas Stroud MD    Please route or send letter to:  Primary Care Provider (PCP) and Referring Provider

## 2025-04-17 ENCOUNTER — TRANSFERRED RECORDS (OUTPATIENT)
Dept: HEALTH INFORMATION MANAGEMENT | Facility: CLINIC | Age: 40
End: 2025-04-17
Payer: COMMERCIAL

## 2025-04-22 ENCOUNTER — VIRTUAL VISIT (OUTPATIENT)
Dept: NEUROPSYCHOLOGY | Facility: CLINIC | Age: 40
End: 2025-04-22
Payer: COMMERCIAL

## 2025-04-22 DIAGNOSIS — Z71.89 ENCOUNTER FOR PSYCHOLOGICAL ASSESSMENT PRIOR TO BARIATRIC SURGERY: Primary | ICD-10-CM

## 2025-04-22 PROCEDURE — 96116 NUBHVL XM PHYS/QHP 1ST HR: CPT | Mod: 95

## 2025-04-22 PROCEDURE — 96121 NUBHVL XM PHY/QHP EA ADDL HR: CPT | Mod: 95

## 2025-04-22 NOTE — PROGRESS NOTES
NEUROPSYCHOLOGICAL EVALUATION  **CONFIDENTIAL**    **This is a medical document intended for communication with the referring provider. It is written in medical language and may contain abbreviations or verbiage that are unfamiliar. It may appear blunt or direct. This report and the results within are not intended to be interpreted in isolation without consultation with your medical provider. **    Name: Abiel Weeks  Education: 12 years    (age): 1985 (40 years)  MEJIA:  2025   Referral: Cassidy Cruz PA-C MRN ([facility]): [id number]      Mr. Weeks is a 40-year-old White male with a history including chronic recurrent pancreatitis, nonischemic cardiomyopathy, essential hypertension, type 2 diabetes, hyperparathyroidism, KAYLA, chronic pain, generalized anxiety disorder, depression, ADHD, and alcohol use disorder (in sustained remission).  This evaluation was requested to provide a brief clinical interview as part of a comprehensive workup for bariatric surgery. An assessment of his current neuropsychological status was completed on 3/4/2025 as part of a comprehensive workup for total pancreatectomy with islet autotransplantation. Please see that report for additional details.     With regard to bariatric surgery, Mr. Weeks struggled to articulate comprehensive knowledge and understanding of the type of bariatric surgery he is considering (gastric sleeve) and stated they have not discussed the procedure with him in detail and he has not looked over any information that was provided to him. He stated they  put something over the outside of my stomach and shrink it  and  they cut it and stitch it together . He was able to describe potential risks or complications of bariatric surgery including infection and his  stomach could burst open  if he eats too much post-operatively. He reported minimal concerns regarding the surgery itself or the recovery process but described feeling apprehensive about  eating immediately after the surgery in the context of worry about his stomach bursting. He demonstrated reasonable expectations regarding portion sizes and was able to discuss necessary changes to his eating habits and food choices including eating soft foods initially ( baby food after the surgery ), consuming proteins first, and avoidance of carbonated drinks. He stated that soda is  one of my favorite vices  currently, but he denied any concern about his ability to stop drinking soda as he has quit other substances in the past and he has already made several dietary and lifestyle changes in the context of his pancreatis. He continues to use cannabis daily for nausea and pain, but he acknowledged increased appetite after consuming cannabis.     He reported he follows a strict diet as instructed by his medical providers which includes low carb, low sodium, low fat, and low protein in the context of his history of chronic pancreatitis, diabetes, and heart failure. He stated that he does not have a typical day of eating and the amount of food he consumes varies day to day. He stated yesterday he ate a bowl of cereal, celery, 2 popsicles, 2-3 handfuls of Cheez its, and 4-5 Hawaiian rolls. He noted feeling better on days he eats less in the context of his pancreatitis, though he acknowledged eating more every 3-7 days, described as eating  about twice as much as I did yesterday . He denied any purging or other methods in order to compensate for perceived over-eating. He noted that he is taking topiramate for appetite suppression which he perceived as effective. He reported he has used exercise as an effective means of weight loss in the past and he lost ~100lbs when he was walking 2 miles per night, though he is unable to exercise now in the context of his poor health. He believes surgery is the next best step for him because he is unable to exercise.     His history is notable for depression, anxiety, and PTSD with  ongoing symptoms that are being treated with medication and therapy. He stated he has missed several recent therapy appointments due to his wes. He acknowledged eating more when his mood is low or if his PTSD is triggered, and he described himself as a  stress eater . He reported his last thought of death or self-harm was over 1 month ago and he has not engaged in any self-harm or risky behavior in quite some time.     He currently lives with his mother, who will provide some post-operative support as needed, though he stated that she needs to work and is unable to take time off around his surgery, so he is in need of additional nursing support.    RECOMMENDATIONS  Based on this limited assessment, Mr. Weeks appears cognitively capable of understanding risks and expectations of bariatric surgery and appears prepared to comply with the long-term after-care and behavioral changes expected. He would, however, would likely benefit from additional conversations with his treatment team regarding the specifics of the surgical procedure, risks to consider, and any lifestyle changes required.   Mr. Sandhu's reported ongoing symptoms of depression, anxiety, and PTSD, which he has struggled to manage with medications and therapy for many years and he indicated using food as a stress management technique. His emotional status appears relatively stable, and he does not appear to be experiencing emotional problems that are likely to interfere with his judgment or ability to follow through with treatment recommendations. As noted in the previous report, he is strongly encouraged to continue regular follow-up with his prescribing physicians regarding medication management of his mental health symptoms and to continue his regular participation in individual psychotherapy. Mr. Weeks was not deemed to be an imminent risk to himself at this time, though given his report of occasional passive fleeting thoughts of death and  self-harm in the past, his providers are encouraged to regularly check in regarding the presence of any suicidal ideation or engagement in self-harm.   Ongoing consultation with social work is recommended to ensure appropriate supports are in place post-operatively.   Mr. Weeks had questions about a required course he needs to complete for his insurance before moving forward with this procedure. Please follow-up with him regarding any educational material he should reference.     Thank you for the opportunity to participate in Mr. Weeks's care. If you have any questions regarding this evaluation, please do not hesitate to contact me.       Purvi Solano, Ph.D., LP, ABPP  Board Certified Clinical Neuropsychologist    BEHAVIORAL OBSERVATIONS  Alert, attentive and focused while undergoing clinical interview  Gait/Posture: Not assessed during this virtual visit  Sensorimotor: Not assessed during this virtual visit  Behavior: Cooperative, pleasant, no behavioral abnormalities noted  Speech: Fluent, articulate, normal rate, prosody, and volume; no conversational word finding difficulty  Thought process: Logical, linear, and goal-directed  Thought content: Logical, appropriate   Affect: Broad, responsive, consistent with reported mood; good eye contact  Mood: Generally euthymic  Insight and Judgment: Intact  Rapport: Easily established and maintained      This evaluation was conducted via a telehealth visit.  Video-Visit Details   Type of service:  Video Visit   Video End Time: 10:25  Originating Location (pt. Location): Home  Distant Location (provider location):  [facility] [place]  Platform used for Video Visit: Polwire  Activities included in this evaluation: CPT Code #Units   Neurobehavioral status exam 14864 1   Neurobehavioral status exam by professional; each add. hour 56281 1   Z71.89

## 2025-04-22 NOTE — LETTER
2025       RE: Abiel Weeks  [address]  [place] [address]     Dear Colleague,    Thank you for referring your patient, Abiel Weeks, to the [facility] [place] at [facility]. Please see a copy of my visit note below.    NEUROPSYCHOLOGICAL EVALUATION  **CONFIDENTIAL**    **This is a medical document intended for communication with the referring provider. It is written in medical language and may contain abbreviations or verbiage that are unfamiliar. It may appear blunt or direct. This report and the results within are not intended to be interpreted in isolation without consultation with your medical provider. **    Name: Abiel Weeks  Education: 12 years    (age): 1985 (40 years)  MEJIA:  2025   Referral: Cassidy Cruz PA-C MRN ([facility]): [id number]      Mr. Weeks is a 40-year-old White male with a history including chronic recurrent pancreatitis, nonischemic cardiomyopathy, essential hypertension, type 2 diabetes, hyperparathyroidism, KAYLA, chronic pain, generalized anxiety disorder, depression, ADHD, and alcohol use disorder (in sustained remission).  This evaluation was requested to provide a brief clinical interview as part of a comprehensive workup for bariatric surgery. An assessment of his current neuropsychological status was completed on 3/4/2025 as part of a comprehensive workup for total pancreatectomy with islet autotransplantation. Please see that report for additional details.     With regard to bariatric surgery, Mr. Weeks struggled to articulate comprehensive knowledge and understanding of the type of bariatric surgery he is considering (gastric sleeve) and stated they have not discussed the procedure with him in detail and he has not looked over any information that was provided to him. He stated they  put something over the outside of my stomach and shrink  it  and  they cut it and stitch it together . He was able to describe potential risks or complications of bariatric surgery including infection and his  stomach could burst open  if he eats too much post-operatively. He reported minimal concerns regarding the surgery itself or the recovery process but described feeling apprehensive about eating immediately after the surgery in the context of worry about his stomach bursting. He demonstrated reasonable expectations regarding portion sizes and was able to discuss necessary changes to his eating habits and food choices including eating soft foods initially ( baby food after the surgery ), consuming proteins first, and avoidance of carbonated drinks. He stated that soda is  one of my favorite vices  currently, but he denied any concern about his ability to stop drinking soda as he has quit other substances in the past and he has already made several dietary and lifestyle changes in the context of his pancreatis. He continues to use cannabis daily for nausea and pain, but he acknowledged increased appetite after consuming cannabis.     He reported he follows a strict diet as instructed by his medical providers which includes low carb, low sodium, low fat, and low protein in the context of his history of chronic pancreatitis, diabetes, and heart failure. He stated that he does not have a typical day of eating and the amount of food he consumes varies day to day. He stated yesterday he ate a bowl of cereal, celery, 2 popsicles, 2-3 handfuls of Cheez its, and 4-5 Hawaiian rolls. He noted feeling better on days he eats less in the context of his pancreatitis, though he acknowledged eating more every 3-7 days, described as eating  about twice as much as I did yesterday . He denied any purging or other methods in order to compensate for perceived over-eating. He noted that he is taking topiramate for appetite suppression which he perceived as effective. He reported he has used  exercise as an effective means of weight loss in the past and he lost ~100lbs when he was walking 2 miles per night, though he is unable to exercise now in the context of his poor health. He believes surgery is the next best step for him because he is unable to exercise.     His history is notable for depression, anxiety, and PTSD with ongoing symptoms that are being treated with medication and therapy. He stated he has missed several recent therapy appointments due to his wes. He acknowledged eating more when his mood is low or if his PTSD is triggered, and he described himself as a  stress eater . He reported his last thought of death or self-harm was over 1 month ago and he has not engaged in any self-harm or risky behavior in quite some time.     He currently lives with his mother, who will provide some post-operative support as needed, though he stated that she needs to work and is unable to take time off around his surgery, so he is in need of additional nursing support.    RECOMMENDATIONS  Based on this limited assessment, Mr. Weeks appears cognitively capable of understanding risks and expectations of bariatric surgery and appears prepared to comply with the long-term after-care and behavioral changes expected. He would, however, would likely benefit from additional conversations with his treatment team regarding the specifics of the surgical procedure, risks to consider, and any lifestyle changes required.   Mr. Sandhu's reported ongoing symptoms of depression, anxiety, and PTSD, which he has struggled to manage with medications and therapy for many years and he indicated using food as a stress management technique. His emotional status appears relatively stable, and he does not appear to be experiencing emotional problems that are likely to interfere with his judgment or ability to follow through with treatment recommendations. As noted in the previous report, he is strongly encouraged to continue  regular follow-up with his prescribing physicians regarding medication management of his mental health symptoms and to continue his regular participation in individual psychotherapy. Mr. Weeks was not deemed to be an imminent risk to himself at this time, though given his report of occasional passive fleeting thoughts of death and self-harm in the past, his providers are encouraged to regularly check in regarding the presence of any suicidal ideation or engagement in self-harm.   Ongoing consultation with social work is recommended to ensure appropriate supports are in place post-operatively.   Mr. Weeks had questions about a required course he needs to complete for his insurance before moving forward with this procedure. Please follow-up with him regarding any educational material he should reference.     Thank you for the opportunity to participate in Mr. Weeks's care. If you have any questions regarding this evaluation, please do not hesitate to contact me.       Purvi Solano, Ph.D., LP, ABPP  Board Certified Clinical Neuropsychologist    BEHAVIORAL OBSERVATIONS  Alert, attentive and focused while undergoing clinical interview  Gait/Posture: Not assessed during this virtual visit  Sensorimotor: Not assessed during this virtual visit  Behavior: Cooperative, pleasant, no behavioral abnormalities noted  Speech: Fluent, articulate, normal rate, prosody, and volume; no conversational word finding difficulty  Thought process: Logical, linear, and goal-directed  Thought content: Logical, appropriate   Affect: Broad, responsive, consistent with reported mood; good eye contact  Mood: Generally euthymic  Insight and Judgment: Intact  Rapport: Easily established and maintained      This evaluation was conducted via a telehealth visit.  Video-Visit Details   Type of service:  Video Visit   Video End Time: 10:25  Originating Location (pt. Location): Home  Distant Location (provider location):  [facility]  Platform used for Video Visit: Sheri  Activities included in this evaluation: CPT Code #Units Time (min)   Psychiatric diagnostic interview 65702 1 --   Test evaluation services by professional; first hour. 65706 1 90   Z71.89      Again, thank you for allowing me to participate in the care of your patient.      Sincerely,    ANDRSE PACE

## 2025-04-22 NOTE — LETTER
2025       RE: Abiel Weeks  [address]  [place] [address]     Dear Colleague,    Thank you for referring your patient, Abiel Weeks, to the [facility] NEUROPSYCHOLOG[facility] [place] at [facility]. Please see a copy of my visit note below.    NEUROPSYCHOLOGICAL EVALUATION  **CONFIDENTIAL**    **This is a medical document intended for communication with the referring provider. It is written in medical language and may contain abbreviations or verbiage that are unfamiliar. It may appear blunt or direct. This report and the results within are not intended to be interpreted in isolation without consultation with your medical provider. **    Name: Abiel Weeks  Education: 12 years    (age): 1985 (40 years)  MEJIA:  2025   Referral: Cassidy Cruz PA-C MRN ([facility]): [id number]      Mr. Weeks is a 40-year-old White male with a history including chronic recurrent pancreatitis, nonischemic cardiomyopathy, essential hypertension, type 2 diabetes, hyperparathyroidism, KAYLA, chronic pain, generalized anxiety disorder, depression, ADHD, and alcohol use disorder (in sustained remission).  This evaluation was requested to provide a brief clinical interview as part of a comprehensive workup for bariatric surgery. An assessment of his current neuropsychological status was completed on 3/4/2025 as part of a comprehensive workup for total pancreatectomy with islet autotransplantation. Please see that report for additional details.     With regard to bariatric surgery, Mr. Weeks struggled to articulate comprehensive knowledge and understanding of the type of bariatric surgery he is considering (gastric sleeve) and stated they have not discussed the procedure with him in detail and he has not looked over any information that was provided to him. He stated they  put something over the outside of my stomach and shrink  it  and  they cut it and stitch it together . He was able to describe potential risks or complications of bariatric surgery including infection and his  stomach could burst open  if he eats too much post-operatively. He reported minimal concerns regarding the surgery itself or the recovery process but described feeling apprehensive about eating immediately after the surgery in the context of worry about his stomach bursting. He demonstrated reasonable expectations regarding portion sizes and was able to discuss necessary changes to his eating habits and food choices including eating soft foods initially ( baby food after the surgery ), consuming proteins first, and avoidance of carbonated drinks. He stated that soda is  one of my favorite vices  currently, but he denied any concern about his ability to stop drinking soda as he has quit other substances in the past and he has already made several dietary and lifestyle changes in the context of his pancreatis. He continues to use cannabis daily for nausea and pain, but he acknowledged increased appetite after consuming cannabis.     He reported he follows a strict diet as instructed by his medical providers which includes low carb, low sodium, low fat, and low protein in the context of his history of chronic pancreatitis, diabetes, and heart failure. He stated that he does not have a typical day of eating and the amount of food he consumes varies day to day. He stated yesterday he ate a bowl of cereal, celery, 2 popsicles, 2-3 handfuls of Cheez its, and 4-5 Hawaiian rolls. He noted feeling better on days he eats less in the context of his pancreatitis, though he acknowledged eating more every 3-7 days, described as eating  about twice as much as I did yesterday . He denied any purging or other methods in order to compensate for perceived over-eating. He noted that he is taking topiramate for appetite suppression which he perceived as effective. He reported he has used  exercise as an effective means of weight loss in the past and he lost ~100lbs when he was walking 2 miles per night, though he is unable to exercise now in the context of his poor health. He believes surgery is the next best step for him because he is unable to exercise.     His history is notable for depression, anxiety, and PTSD with ongoing symptoms that are being treated with medication and therapy. He stated he has missed several recent therapy appointments due to his wes. He acknowledged eating more when his mood is low or if his PTSD is triggered, and he described himself as a  stress eater . He reported his last thought of death or self-harm was over 1 month ago and he has not engaged in any self-harm or risky behavior in quite some time.     He currently lives with his mother, who will provide some post-operative support as needed, though he stated that she needs to work and is unable to take time off around his surgery, so he is in need of additional nursing support.    RECOMMENDATIONS  Based on this limited assessment, Mr. Weeks appears cognitively capable of understanding risks and expectations of bariatric surgery and appears prepared to comply with the long-term after-care and behavioral changes expected. He would, however, would likely benefit from additional conversations with his treatment team regarding the specifics of the surgical procedure, risks to consider, and any lifestyle changes required.   Mr. Sandhu's reported ongoing symptoms of depression, anxiety, and PTSD, which he has struggled to manage with medications and therapy for many years and he indicated using food as a stress management technique. His emotional status appears relatively stable, and he does not appear to be experiencing emotional problems that are likely to interfere with his judgment or ability to follow through with treatment recommendations. As noted in the previous report, he is strongly encouraged to continue  regular follow-up with his prescribing physicians regarding medication management of his mental health symptoms and to continue his regular participation in individual psychotherapy. Mr. Weeks was not deemed to be an imminent risk to himself at this time, though given his report of occasional passive fleeting thoughts of death and self-harm in the past, his providers are encouraged to regularly check in regarding the presence of any suicidal ideation or engagement in self-harm.   Ongoing consultation with social work is recommended to ensure appropriate supports are in place post-operatively.   Mr. Weeks had questions about a required course he needs to complete for his insurance before moving forward with this procedure. Please follow-up with him regarding any educational material he should reference.     Thank you for the opportunity to participate in Mr. Weeks's care. If you have any questions regarding this evaluation, please do not hesitate to contact me.       Purvi Solano, Ph.D., LP, ABPP  Board Certified Clinical Neuropsychologist    BEHAVIORAL OBSERVATIONS  Alert, attentive and focused while undergoing clinical interview  Gait/Posture: Not assessed during this virtual visit  Sensorimotor: Not assessed during this virtual visit  Behavior: Cooperative, pleasant, no behavioral abnormalities noted  Speech: Fluent, articulate, normal rate, prosody, and volume; no conversational word finding difficulty  Thought process: Logical, linear, and goal-directed  Thought content: Logical, appropriate   Affect: Broad, responsive, consistent with reported mood; good eye contact  Mood: Generally euthymic  Insight and Judgment: Intact  Rapport: Easily established and maintained      This evaluation was conducted via a telehealth visit.  Video-Visit Details   Type of service:  Video Visit   Video End Time: 10:25  Originating Location (pt. Location): Home  Distant Location (provider location):  [facility]  Platform used for Video Visit: Sheri  Activities included in this evaluation: CPT Code #Units Time (min)   Psychiatric diagnostic interview 43701 1 --   Test evaluation services by professional; first hour. [id number] 1 90   Z71.89      Again, thank you for allowing me to participate in the care of your patient.      Sincerely,    ANDRES PACE

## 2025-04-23 ENCOUNTER — TELEPHONE (OUTPATIENT)
Dept: ENDOCRINOLOGY | Facility: CLINIC | Age: 40
End: 2025-04-23
Payer: COMMERCIAL

## 2025-04-23 NOTE — TELEPHONE ENCOUNTER
Called patient to discuss HP phone calls which need to be completed prior to bariatric surgery. Patient states best time to receive call is about 1 p.m.  Referral submitted electronically today.

## 2025-05-09 NOTE — PROGRESS NOTES
"Video-Visit Details    Type of service:  Video Visit    Video Start Time: 12:57 PM   Video End Time: 1:16 PM     Originating Location (pt. Location): Home    Distant Location (provider location):  Offsite (providers home) [facility] [place]     Platform used for Video Visit: [facility]    Return Bariatric Nutrition Consultation Note    Reason For Visit: Nutrition Assessment    Abiel Weeks is a 39 year old presenting today for return bariatric nutrition consult.   Patient is interested in weight loss surgery with Dr. Stroud  expected surgery in TBD.  Patient is accompanied by self.  This is patient's 4th of 3 required nutrition visits prior to surgery. Completed Weight Loss Surgery Nutrition Class on 2/28/25.    Working towards weight loss prior to possible pancreatectomy.     Pt referred by MYKE Segura on April 2, 2025.    CO-MORBIDITIES OF OBESITY INCLUDE:        2/19/2025    10:22 PM   --   I have the following health issues associated with obesity Type II Diabetes    Heart Disease    High Blood Pressure    Sleep Apnea    GERD (Reflux)       SUPPORT:      2/19/2025    10:22 PM   Support System Reviewed With Patient   Who do you have in your support network that can be available to help you for the first 2 weeks after surgery? My mother.   Who can you count on for support throughout your weight loss surgery journey? My mother, and the rest of my family.       ANTHROPOMETRICS:  Initial consult weight: 297 lb on 2/20/25   Estimated body mass index is 37.97 kg/m  as calculated from the following:    Height as of this encounter: 1.829 m (6' 0.01\").    Weight as of this encounter: 127 kg (280 lb).  Current Weight: 280 lb per patient report - couple of days ago from home scale.     Required weight loss goal pre-op: -10 lbs from initial consult weight (goal weight 287 lbs or less before surgery) - met         2/19/2025    10:22 PM   --   I have tried the following methods to " lose weight Watching portions or calories    Physician directed program           2/19/2025    10:22 PM   Weight Loss Questions Reviewed With Patient   How long have you been overweight? Since late 20's to early 40's     MEDICATION FOR WEIGHT LOSS:   Metformin   Not a candidate for GLP1 due to hx of pancreatitis   Topiramate - 75 mg daily - eating less, thoughts around food have diminished     VITAMIN/MINERAL SUPPLEMENTS: ROHIT Narvaez    NUTRITION HISTORY:  Food allergies: None   Food intolerances: citrus - can cause GI intolerance   Previous experience with diet modification for weight loss: trying to make mindful choices.   Barriers to lifestyle change: hard for patient to exercise due to chronic pancreatitis.     Trying to portion control more. Making more mindful choices with eating. Follows a low fat/low sodium diet.     Patient avoids meat due to it being a trigger food.     Recent Diet Recall:  Breakfast: Doesn't feel well in the morning when he wakes up. Tries to force himself to have a protein shake (this is protein powder added to skim milk).   Lunch: Cereal with skim milk OR vegetables (celery, carrots, broccoli)   Dinner: Cereal with skim milk OR vegetables (celery, carrots, broccoli)   Snacks: Triscuits, pretzels   Beverages: mostly water, once a week will have a diet soda  Alcohol: avoids alcohol completely     May 2025: Difficult to get a true understanding of what eating looks like for patient. Patient reports recently his diet is a protein shake for breakfast, big salad for lunch - caesar salad with no protein, snack - high protein Cheerios    Very limited food selections available to patient due to his chronic pancreatitis. Patient reports his body can't tolerate a lot of foods due to his chronic pancreatitis. All meats give him problems - severe cramping, abdominal pain. Fruits and vegetables are ok for the most part. Starchy beans are ok. Difficult to get protein goal met so therefore he drinks  protein shakes and eats high protein Cheerios.     Per documentation from recent neuropsych visit and transplant RD working with patient, patient reports eating a lot of carbohydrate snack like foods (ex. Triscuits, Lon Charms, Cheez Its, Hawaiian rolls, also likes SF popsicles).        2/19/2025    10:22 PM   Recall Diet Questions Reviewed With Patient   Describe what you typically consume for breakfast (typical or most recent) Cereal   Describe what you typically consume for lunch (typical or most recent) Some kind of vegetable with light ranch.   Describe what you typically consume for supper (typical or most recent) Tonight I had an Ensure for dinner.  It kind of varies depending on how my pancreatitis is.   Describe what you typically consume as snacks (typical or most recent) Pretzels, low fat triscuits, low sugar popsicles.   How many ounces of water, or other low calorie drinks, do you drink daily (8 oz=1 glass)? 64 oz or more   How many ounces of caffeine (coffee, tea, pop) do you drink daily (8 oz=1 glass)? 8 oz   How many ounces of carbonated (pop, beer, sparkling water) drinks do you drinky daily (8 oz=1 glass)? 8 oz   How many ounces of juice, pop, sweet tea, sports drinks, protein drinks, other sweetened drinks, do you drink daily (8 oz=1 glass)? 16 oz   How many ounces of milk do you drink daily (8 oz=1 glass) 16 oz   Please indicate the type of milk 1%    skim   How often do you drink alcohol? Never           2/19/2025    10:22 PM   Eating Habits   What foods do you crave? I'd love a cheeseburger but with my current health issues that would be unwise.           2/19/2025    10:22 PM   Dining Out History Reviewed With Patient   How often do you dine out? Rarely.   Where do you dine out? (select all that apply) sit-down restaurants   What types of food do you order when you dine out? Usually I have a hard time finding something that agrees with my diet.  I order a salad or vegetables.      EXERCISE:  Gets up and moves around throughout the day if able to.         2/19/2025    10:22 PM   --   How often do you exercise? Less than 1 time per week   What is the duration of your exercise (in minutes)? 10 Minutes   What types of exercise do you do? walking   What keeps you from being more active? I am as active as I can possbily be    Pain     ADDITIONAL INFORMATION:  Mother is a good support for patient.     NUTRITION DIAGNOSIS:  Obesity r/t long history of positive energy balance aeb BMI >30 kg/m2.    INTERVENTION:  Intervention Provided/Education Provided on post-op diet guidelines, vitamins/minerals essential post-operatively, GI anatomy of bariatric surgeries, ways to help prepare for post-op diet guidelines pre-operatively, portion/calorie-control, mindful eating and sources of protein.  Patient demonstrates understanding.     Personal barriers to making and continuing required life changes have been identified, and strategies to overcome those barriers have been recommended AND family and social supports have been assessed and strategies to strengthen those supports have been recommended.    Provided pt with list of goals, RD contact information and resources listed below via [employer].             2/19/2025    10:22 PM   Questions Reviewed With Patient   How ready are you to make changes regarding your weight? Number 1 = Not ready at all to make changes up to 10 = very ready. 8   How confident are you that you can change? 1 = Not confident that you will be successful making changes up to 10 = very confident. 8     Expected Engagement: fair-good    Goals for today's visit:   1) Focus on getting at least 60 grams of protein daily.   2) Try to incorporate more fruits and vegetables where you are able to.   3) Schedule sleep medicine visit.     GOALS:  Relating To Eating:  Eat slowly (20-30 minutes per meal), chewing foods well (25 chews per bite/applesauce consistency)  Eat 3 meals per day  Consume  60-90 g protein per day    Relating to beverages:  Reduce caffeine/carbonation/calorie containing beverages  Separate fluids from meals by 30 minutes before, during, and after eating  Drink 48-64 ounces of fluid per day    Relating to activity:  Increase activity as able    Follow Up: June 17.     Time spent with patient: 19 minutes.  Bre Reyes RD, LD

## 2025-05-13 ENCOUNTER — VIRTUAL VISIT (OUTPATIENT)
Dept: ENDOCRINOLOGY | Facility: CLINIC | Age: 40
End: 2025-05-13
Payer: COMMERCIAL

## 2025-05-13 ENCOUNTER — MYC MEDICAL ADVICE (OUTPATIENT)
Dept: ENDOCRINOLOGY | Facility: CLINIC | Age: 40
End: 2025-05-13

## 2025-05-13 VITALS — WEIGHT: 280 LBS | HEIGHT: 72 IN | BODY MASS INDEX: 37.93 KG/M2

## 2025-05-13 DIAGNOSIS — Z71.3 NUTRITIONAL COUNSELING: Primary | ICD-10-CM

## 2025-05-13 DIAGNOSIS — E11.22 TYPE 2 DIABETES MELLITUS WITH CHRONIC KIDNEY DISEASE, WITHOUT LONG-TERM CURRENT USE OF INSULIN, UNSPECIFIED CKD STAGE (H): ICD-10-CM

## 2025-05-13 DIAGNOSIS — G47.33 OSA (OBSTRUCTIVE SLEEP APNEA): Primary | ICD-10-CM

## 2025-05-13 DIAGNOSIS — E66.9 OBESITY: ICD-10-CM

## 2025-05-13 PROCEDURE — 99207 PR NO CHARGE LOS: CPT | Mod: 95

## 2025-05-13 PROCEDURE — 97803 MED NUTRITION INDIV SUBSEQ: CPT | Mod: 95

## 2025-05-13 NOTE — PATIENT INSTRUCTIONS
Alejandro Beebe,     Follow-up with RD on June 17.     Thank you,    Bre Reyes, YISSEL, LD  If you would like to schedule or reschedule an appointment with the RD, please call [phone]    Nutrition Goals  Goals for today's visit:   1) Focus on getting at least 60 grams of protein daily.   2) Try to incorporate more fruits and vegetables where you are able to.   3) Schedule sleep medicine visit.     GOALS:  Relating To Eating:  Eat slowly (20-30 minutes per meal), chewing foods well (25 chews per bite/applesauce consistency)  Eat 3 meals per day  Consume 60-90 g protein per day    Relating to beverages:  Reduce caffeine/carbonation/calorie containing beverages  Separate fluids from meals by 30 minutes before, during, and after eating  Drink 48-64 ounces of fluid per day    Relating to activity:  Increase activity as able    COMPREHENSIVE WEIGHT MANAGEMENT PROGRAM  VIRTUAL SUPPORT GROUPS    At [facility], our Comprehensive Weight Management program offers on-line support groups for patients who are working on weight loss and considering, preparing for, or have had weight loss surgery.     There is no cost for this opportunity.  You are invited to attend the?Virtual Support Groups?provided by any of the following locations:    [facility] via [employer] Mobile Teams with Roxanne Lanza RD, RN  2.   [place] via [employer] with Otis Valles, PhD, LP  3.   [place] via [employer] with Rosa Isela Wright RN  4.   [facility] via a Zoom Meeting with MEDARDO Fernandes    The following Support Group information can also be found on our website:  [email or web address]      [facility]   WEIGHT LOSS SURGERY SUPPORT GROUP  The support group is a patient-lead forum that meets monthly to share experiences, encouragement and education. It is open to those who have had weight loss surgery, are scheduled for surgery, or are  considering surgery.   WHEN: 3rd Wednesday of each month from 5:00PM - 6:00PM using Microsoft Teams.   FACILITATOR: Led by Roxanne Palomo RD, LD, RN, the program's Clinical Coordinator.   TO REGISTER: Please contact the clinic via [employer] or call the nurse line directly at [phone] to inform our staff that you would like an invite sent to you and to let us know the email you would like the invite sent to. Prior to the meeting, a link with directions on how to join the meeting will be sent to you.    2025 Meetings, 3rd Wednesday, 5:00pm - 6:00pm    January 15: Let's Talk  February19: Let's Talk  March 19: Speaker: Santos Chandler RD, LD  April 16: Let's Talk  May 21: Speaker: Aide Angulo RD, CHAKA  June18: Let's Talk  July 16: Let's Talk  August 20: Let's Talk  September 17: No meeting.  October 15: Speaker: Ninoska Acuña PsyD, LP  November 19: Let's Talk  December 17: Let's Talk    [facility] BARIATRIC CARE SUPPORT GROUP  This is open to all pre- and post- operative bariatric surgery patients as well as their support system.   WHEN: 3rd Tuesday of each month from 6:30 PM - 8:00 PM using Microsoft Teams.   FACILITATOR: Led by Otis Valles, Ph.D who is a Licensed Psychologist with the [facility] Comprehensive Weight Management Program.   TO REGISTER: Please send an email to Otis Valles, Ph.D.,  at?[email or web address]?if you would like an invitation to the group. Prior to the meeting, a link with directions on how to join the meeting will be sent to you.    2025 Meetings, 3rd Tuesday January 21st: Open Forum  February 18th: Medications and Bariatric Surgery  Speaker: Laly Maldonado [facility] Resident  March 18th: Open Forum  April 15th: Genetics of Obesity as well as Q&A, Speaker: Katelyn Bingham MD, [facility].  May 20th: Open Forum  June 17th: Nutritional Labeling, Speaker: TBTIM  July 15th:  Open Forum  August 19th: Open Forum  September 16th: Open Forum  October 21st: Open Forum  November 18th: Holiday Eating, Speaker: ELE  December 16th: Open Forum    [facility] POST-OPERATIVE BARIATRIC SURGERY SUPPORT GROUP  This is a support group for [facility] bariatric patients (and those external to [facility]) who have had bariatric surgery and are at least 3 months post-surgery.  WHEN: 4th Thursday of the month from 11:00 AM - 12:00 PM using Microsoft Teams.   FACILITATOR: Led by Certified Bariatric Nurse, Rosa Isela Wright, RN, CBN.   TO REGISTER: Please send an email to Rosa Isela at [email or web address] if you would like an invitation to the group.  Prior to the meeting, a link with directions on how to join the meeting will be sent to you.    2025 Meetings, 4th Thursday, 11:00am - 12:pm  January 23  February 27  March 27  April 24  May 22  Majo 26  July 24  August 28  September 25  October 23  November 27: Thanksgiving Day, No meeting.      December 25: Sheldon Day, No meeting.        [facility]   HEALTHY LIFESTYLE COACHING GROUP  This is a 60 minute virtual coaching group for those who want to lead a healthier lifestyle. Come together to set goals and overcome barriers in a supportive group environment. We will address the four pillars of health: nutrition, exercise, sleep, and emotional well-being.   WHEN: 1st Friday of the month, 12:30 PM - 1:30 PM   using a Zoom meeting.     FACILITATOR: Led by National Board-Certified Health and , Rosa Isela Pastor [facility].  TO REGISTER: Please call the [facility] at [phone] to register. You will get an appointment to attend in [employer]. Fifteen minutes prior to the meeting, complete the e-check in and you will get the link to join the meeting.  There is no charge to attend this group and space is limited.  Please register for  each month you wish to attend    2025 Meetings, 1st Friday, 12:30pm-1:30pm  January 3  February 7  March 7: No meeting.  April 4  May 2  Majo 6  July 4: Fourth of July Holiday, No meeting.    August 1  September 5  October 3  November 7  December 5

## 2025-05-13 NOTE — LETTER
5/13/2025       RE: Abiel Weeks  [address]  [place] [address]     Dear Colleague,    Thank you for referring your patient, Abiel Weeks, to the [facility] [place] at [facility]. Please see a copy of my visit note below.    Video-Visit Details    Type of service:  Video Visit    Video Start Time: 12:57 PM   Video End Time: 1:16 PM     Originating Location (pt. Location): Home    Distant Location (provider location):  Offsite (providers home) [facility] [place]     Platform used for Video Visit: [employer]    Return Bariatric Nutrition Consultation Note    Reason For Visit: Nutrition Assessment    Abiel Weeks is a 39 year old presenting today for return bariatric nutrition consult.   Patient is interested in weight loss surgery with Dr. Stroud  expected surgery in D.  Patient is accompanied by self.  This is patient's 4th of 3 required nutrition visits prior to surgery. Completed Weight Loss Surgery Nutrition Class on 2/28/25.    Working towards weight loss prior to possible pancreatectomy.     Pt referred by MYKE Segura on April 2, 2025.    CO-MORBIDITIES OF OBESITY INCLUDE:        2/19/2025    10:22 PM   --   I have the following health issues associated with obesity Type II Diabetes    Heart Disease    High Blood Pressure    Sleep Apnea    GERD (Reflux)       SUPPORT:      2/19/2025    10:22 PM   Support System Reviewed With Patient   Who do you have in your support network that can be available to help you for the first 2 weeks after surgery? My mother.   Who can you count on for support throughout your weight loss surgery journey? My mother, and the rest of my family.       ANTHROPOMETRICS:  Initial consult weight: 297 lb on 2/20/25   Estimated body mass index is 37.97 kg/m  as calculated from the following:    Height as of this encounter: 1.829 m  "(6' 0.01\").    Weight as of this encounter: 127 kg (280 lb).  Current Weight: 280 lb per patient report - couple of days ago from home scale.     Required weight loss goal pre-op: -10 lbs from initial consult weight (goal weight 287 lbs or less before surgery) - met         2/19/2025    10:22 PM   --   I have tried the following methods to lose weight Watching portions or calories    Physician directed program           2/19/2025    10:22 PM   Weight Loss Questions Reviewed With Patient   How long have you been overweight? Since late 20's to early 40's     MEDICATION FOR WEIGHT LOSS:   Metformin   Not a candidate for GLP1 due to hx of pancreatitis   Topiramate - 75 mg daily - eating less, thoughts around food have diminished     VITAMIN/MINERAL SUPPLEMENTS: ROHIT Narvaez    NUTRITION HISTORY:  Food allergies: None   Food intolerances: citrus - can cause GI intolerance   Previous experience with diet modification for weight loss: trying to make mindful choices.   Barriers to lifestyle change: hard for patient to exercise due to chronic pancreatitis.     Trying to portion control more. Making more mindful choices with eating. Follows a low fat/low sodium diet.     Patient avoids meat due to it being a trigger food.     Recent Diet Recall:  Breakfast: Doesn't feel well in the morning when he wakes up. Tries to force himself to have a protein shake (this is protein powder added to skim milk).   Lunch: Cereal with skim milk OR vegetables (celery, carrots, broccoli)   Dinner: Cereal with skim milk OR vegetables (celery, carrots, broccoli)   Snacks: Triscuits, pretzels   Beverages: mostly water, once a week will have a diet soda  Alcohol: avoids alcohol completely     May 2025: Difficult to get a true understanding of what eating looks like for patient. Patient reports recently his diet is a protein shake for breakfast, big salad for lunch - caesar salad with no protein, snack - high protein Cheerios    Very limited food " selections available to patient due to his chronic pancreatitis. Patient reports his body can't tolerate a lot of foods due to his chronic pancreatitis. All meats give him problems - severe cramping, abdominal pain. Fruits and vegetables are ok for the most part. Starchy beans are ok. Difficult to get protein goal met so therefore he drinks protein shakes and eats high protein Cheerios.     Per documentation from recent neuropsych visit and transplant RD working with patient, patient reports eating a lot of carbohydrate snack like foods (ex. Triscuits, Lon Charms, Cheez Its, Hawaiian rolls, also likes SF popsicles).        2/19/2025    10:22 PM   Recall Diet Questions Reviewed With Patient   Describe what you typically consume for breakfast (typical or most recent) Cereal   Describe what you typically consume for lunch (typical or most recent) Some kind of vegetable with light ranch.   Describe what you typically consume for supper (typical or most recent) Tonight I had an Ensure for dinner.  It kind of varies depending on how my pancreatitis is.   Describe what you typically consume as snacks (typical or most recent) Pretzels, low fat triscuits, low sugar popsicles.   How many ounces of water, or other low calorie drinks, do you drink daily (8 oz=1 glass)? 64 oz or more   How many ounces of caffeine (coffee, tea, pop) do you drink daily (8 oz=1 glass)? 8 oz   How many ounces of carbonated (pop, beer, sparkling water) drinks do you drinky daily (8 oz=1 glass)? 8 oz   How many ounces of juice, pop, sweet tea, sports drinks, protein drinks, other sweetened drinks, do you drink daily (8 oz=1 glass)? 16 oz   How many ounces of milk do you drink daily (8 oz=1 glass) 16 oz   Please indicate the type of milk 1%    skim   How often do you drink alcohol? Never           2/19/2025    10:22 PM   Eating Habits   What foods do you crave? I'd love a cheeseburger but with my current health issues that would be unwise.            2/19/2025    10:22 PM   Dining Out History Reviewed With Patient   How often do you dine out? Rarely.   Where do you dine out? (select all that apply) sit-down restaurants   What types of food do you order when you dine out? Usually I have a hard time finding something that agrees with my diet.  I order a salad or vegetables.     EXERCISE:  Gets up and moves around throughout the day if able to.         2/19/2025    10:22 PM   --   How often do you exercise? Less than 1 time per week   What is the duration of your exercise (in minutes)? 10 Minutes   What types of exercise do you do? walking   What keeps you from being more active? I am as active as I can possbily be    Pain     ADDITIONAL INFORMATION:  Mother is a good support for patient.     NUTRITION DIAGNOSIS:  Obesity r/t long history of positive energy balance aeb BMI >30 kg/m2.    INTERVENTION:  Intervention Provided/Education Provided on post-op diet guidelines, vitamins/minerals essential post-operatively, GI anatomy of bariatric surgeries, ways to help prepare for post-op diet guidelines pre-operatively, portion/calorie-control, mindful eating and sources of protein.  Patient demonstrates understanding.     Personal barriers to making and continuing required life changes have been identified, and strategies to overcome those barriers have been recommended AND family and social supports have been assessed and strategies to strengthen those supports have been recommended.    Provided pt with list of goals, RD contact information and resources listed below via [employer].             2/19/2025    10:22 PM   Questions Reviewed With Patient   How ready are you to make changes regarding your weight? Number 1 = Not ready at all to make changes up to 10 = very ready. 8   How confident are you that you can change? 1 = Not confident that you will be successful making changes up to 10 = very confident. 8     Expected Engagement: fair-good    Goals for today's visit:    1) Focus on getting at least 60 grams of protein daily.   2) Try to incorporate more fruits and vegetables where you are able to.   3) Schedule sleep medicine visit.     GOALS:  Relating To Eating:  Eat slowly (20-30 minutes per meal), chewing foods well (25 chews per bite/applesauce consistency)  Eat 3 meals per day  Consume 60-90 g protein per day    Relating to beverages:  Reduce caffeine/carbonation/calorie containing beverages  Separate fluids from meals by 30 minutes before, during, and after eating  Drink 48-64 ounces of fluid per day    Relating to activity:  Increase activity as able    Follow Up: June 17.     Time spent with patient: 19 minutes.  Bre Reyes RD, LD      Again, thank you for allowing me to participate in the care of your patient.      Sincerely,    Bre Reyes RD

## 2025-05-13 NOTE — NURSING NOTE
Is the patient currently in the state of [place]? YES    Location: home    Visit mode:VIDEO    If the visit is dropped, the patient can be reconnected by: VIDEO VISIT: Text to cell phone:   Telephone Information:   Mobile [phone]    and VIDEO VISIT: Send to e-mail at: [email or web address]    Will anyone else be joining the visit? NO  (If patient encounters technical issues they should call [phone]    Are changes needed to the allergy or medication list? No    Are refills needed on medications prescribed by this physician? NO    Reason for visit: PASHA Reis, Virtual Visit Facilitator    QNR Status: completed

## 2025-05-15 ENCOUNTER — PATIENT OUTREACH (OUTPATIENT)
Dept: CARE COORDINATION | Facility: CLINIC | Age: 40
End: 2025-05-15
Payer: COMMERCIAL

## 2025-05-22 ENCOUNTER — VIRTUAL VISIT (OUTPATIENT)
Dept: ENDOCRINOLOGY | Facility: CLINIC | Age: 40
End: 2025-05-22
Payer: COMMERCIAL

## 2025-05-22 VITALS — HEIGHT: 72 IN | WEIGHT: 280 LBS | BODY MASS INDEX: 37.93 KG/M2

## 2025-05-22 DIAGNOSIS — E66.01 MORBID OBESITY WITH BMI OF 40.0-44.9, ADULT (H): Primary | ICD-10-CM

## 2025-05-22 DIAGNOSIS — E11.22 TYPE 2 DIABETES MELLITUS WITH CHRONIC KIDNEY DISEASE, WITHOUT LONG-TERM CURRENT USE OF INSULIN, UNSPECIFIED CKD STAGE (H): ICD-10-CM

## 2025-05-22 DIAGNOSIS — K86.1 CHRONIC RECURRENT PANCREATITIS (H): ICD-10-CM

## 2025-05-22 DIAGNOSIS — G47.33 OSA (OBSTRUCTIVE SLEEP APNEA): ICD-10-CM

## 2025-05-22 RX ORDER — TOPIRAMATE 25 MG/1
75 TABLET, FILM COATED ORAL DAILY
Qty: 90 TABLET | Refills: 3 | Status: SHIPPED | OUTPATIENT
Start: 2025-05-22

## 2025-05-22 NOTE — Clinical Note
Planning on sleeve. He has neuropsych for transplant 3/4/25.  Can we add on to that possibly or does he need to schedule a different one?  Thanks

## 2025-05-22 NOTE — NURSING NOTE
Current patient location: [address] [place] [address] [address]  [place] [address]    Is the patient currently in the state of [place]? YES    Visit mode:VIDEO    If the visit is dropped, the patient can be reconnected by: VIDEO VISIT: Text to cell phone:   Telephone Information:   Mobile [phone]       Will anyone else be joining the visit? NO  (If patient encounters technical issues they should call [phone]    Are changes needed to the allergy or medication list? Pt stated no changes to allergies and Pt stated no med changes    Are refills needed on medications prescribed by this physician? NO    Reason for visit: RECHECK    Gina TOTH

## 2025-05-22 NOTE — PROGRESS NOTES
Pre-Bariatric Surgery Note    Brandon Garcia    Date: 2025     RE: Abiel Weeks    MR#: [id number]   : 1985   Date of Visit: May 22, 2025    REASON FOR VISIT: Preoperative evaluation for possible weight loss surgery    Dear Brandon Mclaughlin,    I had the pleasure of seeing your patient, Abiel Weeks, in my preoperative bariatric clinic.    As you know, he has morbid obesity and is considering weight loss surgery to treat obesity in association with his medical conditions of obesity.  His consult weight was 297.  He has lost 17 pounds since his consult weight. He has met his required pre-surgery weight. Please refer to initial consult note from date 25 for patient's weight history and co-morbidities.     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 Needs BMI <30 for TPIAT    RD visits done  HP calls in progress  done  Saw Dr Stroud in clinic  Sleep seeing 25  Psych, had neuropsych 3/4 for transplant. Will see if we can coordinate      T2DM:   A1C 6.2  Not check BS regularly, plans to start using dexcom. Takes metformin     [facility] managing chronic abdominal pain  Seen by Dr Virgen (endo), Dr Montes (surgery), Dr Lay (GI). Will message team and Dr Stroud/Dr Campos to discuss plan.  Not a candidate for GLP1 due hx of pancreatitis.    Continue topiramate 75mg daily  Complete labs at any [facility] lab    Follow up end of Aug or   of Sept with Cassidy  Continue monthly with RD    Reviewed tasklist with patient yes    Most recent weights:  Wt Readings from Last 4 Encounters:   05/22/25 127 kg (280 lb)   05/13/25 127 kg (280 lb)   04/03/25 127 kg (280 lb)   04/02/25 127 kg (280 lb)         ROS    Past Medical History:   Diagnosis Date    Chronic kidney disease     Chronic recurrent pancreatitis (H)     Depressive disorder 1994    Have had it since I was young.    Diabetes (H)     DM2 (diabetes mellitus, type 2) (H)     NOAH (generalized anxiety disorder)     Gastroesophageal reflux disease     History of diabetes mellitus 2022    Type 2    Hyperparathyroidism     Hypertension     Kidney failure 2019    Stage 3 but no dialysis.    MDD (major depressive disorder)     Nonischemic cardiomyopathy (H)     Obese     KAYLA (obstructive sleep apnea)     Other chronic pain     Other mental problems 2021    PTSD    PTSD (post-traumatic stress disorder)     Seasonal allergic rhinitis     Sleep apnea     Stomach problems 2019    Pancreatitis (Recurrent)       Past Surgical History:   Procedure Laterality Date    ABDOMEN SURGERY  2024    Lala had several all endoscopic    CHOLECYSTECTOMY      ENDOSCOPIC ULTRASOUND UPPER GASTROINTESTINAL TRACT (GI) N/A 07/18/2024    Procedure: ENDOSCOPIC ULTRASOUND, ESOPHAGOSCOPY / UPPER GASTROINTESTINAL TRACT (GI) with cystgastrostomy;  Surgeon: Murtaza Villafuerte MD;  Location: [facility] OR    ESOPHAGOSCOPY, GASTROSCOPY, DUODENOSCOPY (EGD), COMBINED N/A 07/25/2024    Procedure: ESOPHAGOGASTRODUODENOSCOPY with flouroscopy with Axios stent removal;  Surgeon: Murtaza Villafuerte MD;  Location: [facility] OR       Current Outpatient Medications   Medication Sig Dispense Refill    topiramate (TOPAMAX) 25 MG tablet Take 3 tablets (75 mg) by mouth daily. 90 tablet 3    acetaminophen (TYLENOL) 500 MG tablet Take 1,000 mg by mouth every 8 hours as needed      albuterol (PROAIR HFA/PROVENTIL HFA/VENTOLIN HFA) 108 (90 Base) MCG/ACT inhaler Inhale 2  puffs into the lungs every 4 hours as needed for shortness of breath, wheezing or cough      alum & mag hydroxide-simethicone (CONSTANTINO-LANTA) 200-200-20 MG/5ML SUSP suspension Take 15 mLs by mouth every 4 hours as needed.      amitriptyline (ELAVIL) 75 MG tablet Take 75 mg by mouth at bedtime.      amLODIPine (NORVASC) 10 MG tablet Take 1 tablet by mouth daily      carvedilol (COREG) 25 MG tablet Take 50 mg by mouth 2 times daily (with meals)      Continuous Glucose Sensor (DEXCOM G7 SENSOR) MISC Change every 10 days. 3 each 5    docusate sodium (COLACE) 100 MG capsule Take 200 mg by mouth daily.      DULoxetine (CYMBALTA) 60 MG capsule Take 60 mg by mouth daily      hydrALAZINE (APRESOLINE) 100 MG tablet Take 100 mg by mouth 3 times daily      isosorbide mononitrate (IMDUR) 30 MG 24 hr tablet Take 90 mg by mouth at bedtime      lipase-protease-amylase (CREON) 79971-23820-452395 units CPEP per EC capsule Take 2-3 with meals / 1-2 with snacks, up to 15 per day. 450 capsule 6    medical cannabis (Patient's own supply) See Admin Instructions. (The purpose of this order is to document that the patient reports taking medical cannabis.  This is not a prescription, and is not used to certify that the patient has a qualifying medical condition.)      melatonin 3 MG CAPS Take 6 mg by mouth at bedtime.      metFORMIN (GLUCOPHAGE) 1000 MG tablet Take 1,000 mg by mouth 2 times daily (with meals)      Multiple Vitamins-Minerals (SUPER THERA TOÑO M) TABS Take 1 tablet by mouth daily      ondansetron (ZOFRAN ODT) 4 MG ODT tab Place 4 mg under the tongue every 8 hours as needed      pantoprazole (PROTONIX) 40 MG EC tablet Take 40 mg by mouth 2 times daily (with meals)      polyethylene glycol (MIRALAX) 17 g packet Take 17 g by mouth daily as needed      pregabalin (LYRICA) 150 MG capsule Take 1 capsule by mouth 3 times daily       No current facility-administered medications for this visit.       Allergies   Allergen Reactions     Iodine Hives, Itching and Rash     Pt allergic to iodine/ shellfish , contrast dyes.     Pt allergic to iodine/ shellfish , contrast dyes.    Trazodone Other (See Comments)     priapsim    Amoxicillin      Patient does not what type of reaction/happened as a child    Citrus Nausea    Methylcellulose     Shellfish-Derived Products Nausea and Nausea and Vomiting     Other Reaction(s): GI Upset, Unknown       Lab on 02/06/2025   Component Date Value Ref Range Status    Sodium 02/06/2025 138  135 - 145 mmol/L Final    Potassium 02/06/2025 3.8  3.4 - 5.3 mmol/L Final    Carbon Dioxide (CO2) 02/06/2025 24  22 - 29 mmol/L Final    Anion Gap 02/06/2025 13  7 - 15 mmol/L Final    Urea Nitrogen 02/06/2025 17.8  6.0 - 20.0 mg/dL Final    Creatinine 02/06/2025 1.06  0.67 - 1.17 mg/dL Final    GFR Estimate 02/06/2025 90  >60 mL/min/1.73m2 Final    eGFR calculated using 2021 CKD-EPI equation.    Calcium 02/06/2025 9.2  8.8 - 10.4 mg/dL Final    Chloride 02/06/2025 101  98 - 107 mmol/L Final    Glucose 02/06/2025 125 (H)  70 - 99 mg/dL Final    Alkaline Phosphatase 02/06/2025 78  40 - 150 U/L Final    AST 02/06/2025 20  0 - 45 U/L Final    ALT 02/06/2025 12  0 - 70 U/L Final    Protein Total 02/06/2025 7.3  6.4 - 8.3 g/dL Final    Albumin 02/06/2025 4.4  3.5 - 5.2 g/dL Final    Bilirubin Total 02/06/2025 0.4  <=1.2 mg/dL Final    Patient Fasting > 8hrs? 02/06/2025 Yes   Final    Vitamin A 02/06/2025 1.13  0.30 - 1.20 mg/L Final    Retinol Palmitate 02/06/2025 0.03  0.00 - 0.10 mg/L Final    Vitamin A Interp 02/06/2025 Normal   Final      This test was developed and its performance characteristics   determined by [facility]. It has not been cleared or   approved by the US Food and Drug Administration. This test   was performed in a CLIA certified laboratory and is   intended for clinical purposes.  Performed By: [facility]  [address] [place] [address]  : Levy Lucas MD,  PhD  CLIA Number: [id number]    Vitamin E 02/06/2025 12.9  5.5 - 18.0 mg/L Final      This test was developed and its performance characteristics   determined by [facility]. It has not been cleared or   approved by the US Food and Drug Administration. This test   was performed in a CLIA certified laboratory and is   intended for clinical purposes.    Vitamin E Gamma 02/06/2025 0.9  0.0 - 6.0 mg/L Final    Performed By: [facility]  [address] [place] [id number]  : Levy Lucas MD, PhD  CLIA Number: [id number]    Estimated Average Glucose 02/06/2025 131 (H)  <117 mg/dL Final    Hemoglobin A1C 02/06/2025 6.2 (H)  <5.7 % Final    Normal <5.7%   Prediabetes 5.7-6.4%    Diabetes 6.5% or higher     Note: Adopted from ADA consensus guidelines.    Lipase 02/06/2025 32  13 - 60 U/L Final    Amylase 02/06/2025 90  28 - 100 U/L Final    Cholesterol 02/06/2025 169  <200 mg/dL Final    Triglycerides 02/06/2025 335 (H)  <150 mg/dL Final    Direct Measure HDL 02/06/2025 42  >=40 mg/dL Final    LDL Cholesterol Calculated 02/06/2025 60  <100 mg/dL Final    Non HDL Cholesterol 02/06/2025 127  <130 mg/dL Final    Patient Fasting > 8hrs? 02/06/2025 Yes   Final    25 OH Vitamin D2 02/06/2025 <5  ug/L Final    25 OH Vitamin D3 02/06/2025 25  ug/L Final    25 OH Vit D Total 02/06/2025 <30  20 - 75 ug/L Final    Season, race, dietary intake, and treatment affect the concentration of 25-hydroxy-Vitamin D. Values may decrease during winter months and increase during summer months. Values 20-29 ug/L may indicate Vitamin D insufficiency and values <20 ug/L may indicate Vitamin D deficiency.    CEA 02/06/2025 1.1  ng/mL Final    Nonsmoker (past/never) <=5.0 ng/mL   Current smoker <=6.5 ng/mL     This result is obtained using the Roche Elecsys CEA method on the ele e801 immunoassay analyzer. Results obtained with different assay methods or kits cannot be used interchangeably.    Cancer Antigen  19-9 02/06/2025 14  <=35 U/mL Final    INTERPRETIVE INFORMATION: Cancer Antigen-GI (CA 19-9)    This test uses Roche CA 19-9 electrochemiluminescent   immunoassay. Results obtained with different test methods   or kits cannot be used interchangeably. CA 19-9 value is   useful in monitoring pancreatic, hepatobiliary, gastric,   hepatocellular, and colorectal cancer. CA 19-9 value,   regardless of level, should not be interpreted as absolute   evidence of the presence or absence of malignant disease.  Performed By: [email or web address]  [address] [place] [address]  : Levy Lucas MD, PhD  CLIA Number: [id number]    Glutamic Acid Decarboxylase Antibo* 02/06/2025 <5.0  0.0 - 5.0 IU/mL Final    INTERPRETIVE INFORMATION:  Glutamic Acid Decarboxylase   Antibody    A value greater than 5.0 IU/mL is considered positive for   Glutamic Acid Decarboxylase Antibody (NOAH Ab). This assay   is intended for the semi-quantitative determination of the   NOAH Ab in human serum. Results should be interpreted within   the context of clinical symptoms.  Performed By: [email or web address]  [address] [place] [address]  : Levy Lucas MD, PhD  CLIA Number: [id number]    Islet Cell Antibody IgG 02/06/2025 <1:4  <1:4 Final    INTERPRETIVE INFORMATION: Islet Cell Ab, IgG    Islet cell antibodies (ICAs) are associated with type 1   diabetes (TID), an autoimmune endocrine disorder. ICAs may   be present years before the onset of clinical symptoms. To   calculate Juvenile Diabetes Foundation (JDF) units:   multiply the titer x 5 (1:8  8 x 5 = 40 JDF Units).    This test was developed and its performance characteristics   determined by [email or web address]. It has not been cleared or   approved by the US Food and Drug Administration. This test   was performed in a CLIA certified laboratory and is   intended for clinical purposes.  Performed By: [facility]  [address] [place] [address]  [place] [address]  : Levy Lucas MD, PhD  CLIA Number: [id number]    Insulin Antibodies 02/06/2025 <0.4  0.0 - 0.4 U/mL Final    INTERPRETIVE INFORMATION: Insulin Antibody    A value greater than 0.4 Kronus Units/mL is considered   positive for Insulin Antibody. Kronus units are arbitrary.   Kronus Units = U/mL.    This assay is intended for the semi-quantitative   determination of antibodies to endogenous insulin or   antibodies to exogenous insulin in human serum. Antibodies   to exogenous insulin therapies may be detected using this   method. The magnitude of the measured result is not related   to disease progression. Results should be interpreted   within the context of clinical symptoms.  Performed By: [facility]  [address] [place], [place] [id number]  : Levy Lucas MD, PhD  CLIA Number: [id number]    C Peptide 02/06/2025 2.2  0.9 - 6.9 ng/mL Final    Reference range applies to fasting specimens.    Patient Fasting > 8hrs? 02/06/2025 Yes   Final    Iron 02/06/2025 64  61 - 157 ug/dL Final    Iron Binding Capacity 02/06/2025 376  240 - 430 ug/dL Final    Iron Sat Index 02/06/2025 17  15 - 46 % Final    Ferritin 02/06/2025 111  31 - 409 ng/mL Final    Hepatitis C Antibody 02/06/2025 Nonreactive  Nonreactive Final    A nonreactive screening test result does not exclude the possibility of exposure to or infection with HCV. Nonreactive screening test results in individuals with prior exposure to HCV may be due to antibody levels below the limit of detection of this assay or lack of reactivity to the HCV antigens used in this assay. Patients with recent HCV infections (<3 months from time of exposure) may have false-negative HCV antibody results due to the time needed for seroconversion (average of 8 to 9 weeks).    Cotinine Confirm 02/06/2025 <15  ng/mL Final    Nicotine Confirmation Urine 02/06/2025 <15  ng/mL Final    Comment:  INTERPRETIVE INFORMATION: Nicotine and Metabolites,                             Urine, Quantitative    Methodology: Quantitative Liquid Chromatography-Tandem Mass   Spectrometry    Positive cutoff:  Nicotine  15 ng/mL  Cotinine  15 ng/mL  3-OH-Cotinine 50 ng/mL  Anabasine        5 ng/mL    For medical purposes only; not valid for forensic use.     This test is designed to evaluate recent use of   nicotine-containing products.  Passive and active exposure   cannot be discriminated definitively, although a cutoff of   100 ng/mL cotinine is frequently used for surgery   qualification purposes.  For smoking cessation programs or   compliance testing, the absence of expected drug(s) and/or   drug metabolite(s) may indicate non-compliance,   inappropriate timing of specimen collection relative to   drug administration, poor drug absorption,   diluted/adulterated urine, or limitations of testing. The   concentration value must be greater than or equal to the   cutoff to be reported as                            positive. Anabasine is included as   a biomarker of tobacco use, versus nicotine replacement.    Interpretive questions should be directed to the   laboratory.     This test was developed and its performance characteristics   determined by [facility]. It has not been cleared or   approved by the US Food and Drug Administration. This test   was performed in a CLIA certified laboratory and is   intended for clinical purposes.  Performed By: [facility]  [address] [place], [place] [id number]  : Levy Lucas MD, PhD  CLIA Number: [id number]    4-BK-[id number], Urn, Quant 02/06/2025 <50  ng/mL Final    Anabasine, Urn, Quant 02/06/2025 <5  ng/mL Final    PEth 16:0/18:1 (POPEth) 02/06/2025 <10  ng/mL Final    PEth 16:0/18:1 (POPEth)  Less than 10 ng/mL............Not detected  Less than 20 ng/mL............Abstinence or light alcohol   consumption  20 - 200  ng/mL................Moderate alcohol consumption  Greater than 200 ng/mL........Heavy alcohol consumption or   chronic alcohol use    (Reference: JANELL Sam and MARGARET Briscoe 2018 J. Forensic Sci)    PEth 16:0/18:2 (PLPEth) 02/06/2025 <10  ng/mL Final    Reference ranges are not well established.    EER Phosphatidylethanol (PETH) 02/06/2025 See Note   Final    Authorized individuals can access the [facility] Enhanced Report  with an [facility] Connect account using the following link.     Your local lab can assist you in obtaining the patient  report if you don't have a Connect account.     [email or web address]    PEth Interpretation 02/06/2025 See Comment   Final    Comment: Phosphatidylethanol (PEth) is a group of phospholipids   formed in the presence of ethanol, phospholipase D and   phosphatidylcholine. PEth is known to be a direct alcohol   biomarker. The predominant PEth homologues are PEth   16:0/18:1 (POPEth) and PEth 16:0/18:2 (PLPEth), which   account for 37-46% and 26-28% of the total PEth homologues,   respectively. PEth is incorporated into the phospholipid   membrane of red blood cells and has a general half-life of   4-10 days and a window of detection of 2-4 weeks. However,   the window of detection is longer in individuals who   chronically or excessively consume alcohol. The limit of   quantification is 10 ng/mL. Serial monitoring of PEth may   be helpful in monitoring alcohol abstinence over time. PEth   results should be interpreted in the context of the   patient's clinical and behavioral history.  Patients with advanced liver disease may have falsely   elevated PEth concentrations (Elizabeth DORANTES et al 2018,   Alcoholism Clinical &                            Experimental Research).    This test was developed and its performance characteristics   determined by [facility]. It has not been cleared or   approved by the U.S. Food and Drug Administration. This   test was performed in a  "CLIA-certified laboratory and is   intended for clinical purposes.  Performed By: [facility]  [address] [place] [id number]  : Levy Lucas MD, PhD  CLIA Number: [id number]    WBC Count 02/06/2025 8.9  4.0 - 11.0 10e3/uL Final    RBC Count 02/06/2025 4.80  4.40 - 5.90 10e6/uL Final    Hemoglobin 02/06/2025 13.9  13.3 - 17.7 g/dL Final    Hematocrit 02/06/2025 42.0  40.0 - 53.0 % Final    MCV 02/06/2025 88  78 - 100 fL Final    MCH 02/06/2025 29.0  26.5 - 33.0 pg Final    MCHC 02/06/2025 33.1  31.5 - 36.5 g/dL Final    RDW 02/06/2025 13.2  10.0 - 15.0 % Final    Platelet Count 02/06/2025 210  150 - 450 10e3/uL Final    % Neutrophils 02/06/2025 67  % Final    % Lymphocytes 02/06/2025 23  % Final    % Monocytes 02/06/2025 7  % Final    % Eosinophils 02/06/2025 2  % Final    % Basophils 02/06/2025 1  % Final    % Immature Granulocytes 02/06/2025 0  % Final    NRBCs per 100 WBC 02/06/2025 0  <1 /100 Final    Absolute Neutrophils 02/06/2025 6.0  1.6 - 8.3 10e3/uL Final    Absolute Lymphocytes 02/06/2025 2.0  0.8 - 5.3 10e3/uL Final    Absolute Monocytes 02/06/2025 0.6  0.0 - 1.3 10e3/uL Final    Absolute Eosinophils 02/06/2025 0.2  0.0 - 0.7 10e3/uL Final    Absolute Basophils 02/06/2025 0.1  0.0 - 0.2 10e3/uL Final    Absolute Immature Granulocytes 02/06/2025 0.0  <=0.4 10e3/uL Final    Absolute NRBCs 02/06/2025 0.0  10e3/uL Final    C Peptide 02/06/2025 6.0  0.9 - 6.9 ng/mL Final    Reference range applies to fasting specimens.    Patient Fasting > 8hrs? 02/06/2025 No   Final    Glucose 02/06/2025 184 (H)  70 - 99 mg/dL Final    Patient Fasting > 8hrs? 02/06/2025 No   Final    C Peptide 02/06/2025 6.3  0.9 - 6.9 ng/mL Final    Reference range applies to fasting specimens.    Patient Fasting > 8hrs? 02/06/2025 Yes   Final    Glucose 02/06/2025 122 (H)  70 - 99 mg/dL Final    Patient Fasting > 8hrs? 02/06/2025 Yes   Final       PHYSICAL EXAM:  Objective    Ht 1.829 m (6' 0.01\") "   Wt 127 kg (280 lb)   BMI 37.97 kg/m           Vitals:  No vitals were obtained today due to virtual visit.    Physical Exam   GENERAL: alert and no distress  EYES: Eyes grossly normal to inspection.  No discharge or erythema, or obvious scleral/conjunctival abnormalities.  RESP: No audible wheeze, cough, or visible cyanosis.    SKIN: Visible skin clear. No significant rash, abnormal pigmentation or lesions.  NEURO: Cranial nerves grossly intact.  Mentation and speech appropriate for age.  PSYCH: Appropriate affect, tone, and pace of words    Sleeve Gastrectomy: Risks and Side Effects    The complications or risks of surgery include but are not limited to: death, heart attack, infection in the surgical site (wound infection), abdomen (abscess), bladder (urinary tract infection), lungs (pneumonia), clots in legs (deep vein thrombosis) or lungs (pulmonary emboli),  injury to the bowels or other organs, bowel obstruction, hernia at the incision and gastrointestional bleeding.    More specific risks related to vertical sleeve gastrectomy were detailed at the bariatric informational seminar and include the following: leak at the vertical sleeve staple line, leak at the anastomoses,  nausea, vomiting, and dehydration for several months,  adhesions causing bowel obstruction, rapid weight loss causing a higher rate of gallstone formation during the first 6 months after surgery, decreased absorption of vitamins and protein because of the reduced stomach size, weight regain if inappropriate food intake occurs, stricture, injury to other organs, hernia,  and ulcers.       Side effects of bariatric surgery include but are not limited to: abdominal pain, cramping, bloating, constipation, nausea, vomiting, diarrhea, difficulty swallowing,  dehydration, hair loss, excess skin, protein, iron and vitamin deficiencies, heartburn, transfer of addictions, increased anxiety and worsening depression.       I emphasized exercise and  activity behavior along with appropriate food choice as the main foundation for weight loss with surgery providing surgical reinforcement of the appropriate behavior set.        Review of general surgery weight loss process    1. Complete preoperative requirements, including weight loss.  Final weight check to confirm MANDATORY weight loss requirement must be documented on a clinic scale.    2. Discuss prior authorization with .    3. History and physical evaluation by PCP of PAC clinic within 30 days of surgery date, preoperative class, and weight check (weigh-in visit) to be scheduled by patient.  Pre-anesthesia clinic for risk evaluation to be scheduled by anesthesia clinic.    4. We cannot guarantee that patient will qualify for surgery unless all preoperative requirements are met, prior authorization from primary insurance company is granted, and insurance changes do not occur.    5. It is possible for patients to regain all weight after weight loss surgery unless they follow guidelines prescribed by our bariatric center.    6. All patients with gastrointestinal complaints after weight loss surgery must have complaints conveyed to the bariatric team for appropriate treatment.    7. Vitamin deficiencies may develop post-bariatric surgery and annual laboratory testing should be performed.    8. Persistent nausea/vomiting after bariatric surgery entails risk of thiamine deficiency and should be treated early.  Vitamin B12 deficiency may develop, especially after gastric bypass surgery and must be recognized.        If you have any questions about our plans please don't hesitate to contact me.    Sincerely,    Cassidy Cruz PA-C      20 minutes spent by me on the date of the encounter doing chart review, history and exam, documentation and further activities per the note        Virtual Visit Details    Type of service:  Video Visit   Video Start Time: 1102  Video End Time:11:21  YAN    Originating Location (pt. Location): Home    Distant Location (provider location):  Off-site  Platform used for Video Visit: Sheri    The longitudinal plan of care for the diagnosis(es)/condition(s) as documented were addressed during this visit. Due to the added complexity in care, I will continue to support Abiel in the subsequent management and with ongoing continuity of care.

## 2025-05-22 NOTE — LETTER
2025       RE: Abiel Weeks  [address]  [place] [address]     Dear Colleague,    Thank you for referring your patient, Abiel Weeks, to the [facility] [place] at [facility]. Please see a copy of my visit note below.      Pre-Bariatric Surgery Note    Brandon Garcia    Date: 2025     RE: Abiel Weeks    MR#: [id number]   : 1985   Date of Visit: May 22, 2025    REASON FOR VISIT: Preoperative evaluation for possible weight loss surgery    Dear Brandon Mclaughlin,    I had the pleasure of seeing your patient, Abiel Weeks, in my preoperative bariatric clinic.    As you know, he has morbid obesity and is considering weight loss surgery to treat obesity in association with his medical conditions of obesity.  His consult weight was 297.  He has lost 17 pounds since his consult weight. He has met his required pre-surgery weight. Please refer to initial consult note from date 25 for patient's weight history and co-morbidities.     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 Needs BMI <30 for TPIAT    RD visits done  HP calls in progress 2/5 done  Saw Dr Stroud in clinic  Sleep seeing 25  Psych, had neuropsych 3/4 for transplant. Will see if we can coordinate      T2DM:   A1C 6.2  Not check BS regularly, plans to start using dexcom.  Takes metformin     [facility] Pain managing chronic abdominal pain  Seen by Dr Virgen (endo), Dr Montes (surgery), Dr Lay (GI). Will message team and Dr Stroud/Dr Campos to discuss plan.  Not a candidate for GLP1 due hx of pancreatitis.    Continue topiramate 75mg daily  Complete labs at any [facility] lab    Follow up end of Aug or beg of Sept with Cassidy  Continue monthly with RD    Reviewed tasklist with patient yes    Most recent weights:  Wt Readings from Last 4 Encounters:   05/22/25 127 kg (280 lb)   05/13/25 127 kg (280 lb)   04/03/25 127 kg (280 lb)   04/02/25 127 kg (280 lb)         ROS    Past Medical History:   Diagnosis Date     Chronic kidney disease      Chronic recurrent pancreatitis (H)      Depressive disorder 1994    Have had it since I was young.     Diabetes (H)      DM2 (diabetes mellitus, type 2) (H)      NOAH (generalized anxiety disorder)      Gastroesophageal reflux disease      History of diabetes mellitus 2022    Type 2     Hyperparathyroidism      Hypertension      Kidney failure 2019    Stage 3 but no dialysis.     MDD (major depressive disorder)      Nonischemic cardiomyopathy (H)      Obese      KAYLA (obstructive sleep apnea)      Other chronic pain      Other mental problems 2021    PTSD     PTSD (post-traumatic stress disorder)      Seasonal allergic rhinitis      Sleep apnea      Stomach problems 2019    Pancreatitis (Recurrent)       Past Surgical History:   Procedure Laterality Date     ABDOMEN SURGERY  2024    Lala had several all endoscopic     CHOLECYSTECTOMY       ENDOSCOPIC ULTRASOUND UPPER GASTROINTESTINAL TRACT (GI) N/A 07/18/2024    Procedure: ENDOSCOPIC ULTRASOUND, ESOPHAGOSCOPY / UPPER GASTROINTESTINAL TRACT (GI) with cystgastrostomy;  Surgeon: Murtaza Villafuerte MD;  Location: [facility] OR     ESOPHAGOSCOPY, GASTROSCOPY, DUODENOSCOPY (EGD), COMBINED N/A 07/25/2024    Procedure: ESOPHAGOGASTRODUODENOSCOPY with flouroscopy with Axios stent removal;  Surgeon: Murtaza Villafuerte  MD Gallo;  Location: [facility] OR       Current Outpatient Medications   Medication Sig Dispense Refill     topiramate (TOPAMAX) 25 MG tablet Take 3 tablets (75 mg) by mouth daily. 90 tablet 3     acetaminophen (TYLENOL) 500 MG tablet Take 1,000 mg by mouth every 8 hours as needed       albuterol (PROAIR HFA/PROVENTIL HFA/VENTOLIN HFA) 108 (90 Base) MCG/ACT inhaler Inhale 2 puffs into the lungs every 4 hours as needed for shortness of breath, wheezing or cough       alum & mag hydroxide-simethicone (CONSTANTINO-LANTA) 200-200-20 MG/5ML SUSP suspension Take 15 mLs by mouth every 4 hours as needed.       amitriptyline (ELAVIL) 75 MG tablet Take 75 mg by mouth at bedtime.       amLODIPine (NORVASC) 10 MG tablet Take 1 tablet by mouth daily       carvedilol (COREG) 25 MG tablet Take 50 mg by mouth 2 times daily (with meals)       Continuous Glucose Sensor (DEXCOM G7 SENSOR) MISC Change every 10 days. 3 each 5     docusate sodium (COLACE) 100 MG capsule Take 200 mg by mouth daily.       DULoxetine (CYMBALTA) 60 MG capsule Take 60 mg by mouth daily       hydrALAZINE (APRESOLINE) 100 MG tablet Take 100 mg by mouth 3 times daily       isosorbide mononitrate (IMDUR) 30 MG 24 hr tablet Take 90 mg by mouth at bedtime       lipase-protease-amylase (CREON) 45460-43810-099116 units CPEP per EC capsule Take 2-3 with meals / 1-2 with snacks, up to 15 per day. 450 capsule 6     medical cannabis (Patient's own supply) See Admin Instructions. (The purpose of this order is to document that the patient reports taking medical cannabis.  This is not a prescription, and is not used to certify that the patient has a qualifying medical condition.)       melatonin 3 MG CAPS Take 6 mg by mouth at bedtime.       metFORMIN (GLUCOPHAGE) 1000 MG tablet Take 1,000 mg by mouth 2 times daily (with meals)       Multiple Vitamins-Minerals (SUPER THERA TOÑO M) TABS Take 1 tablet by mouth daily       ondansetron (ZOFRAN ODT) 4 MG ODT tab Place 4 mg under the tongue  every 8 hours as needed       pantoprazole (PROTONIX) 40 MG EC tablet Take 40 mg by mouth 2 times daily (with meals)       polyethylene glycol (MIRALAX) 17 g packet Take 17 g by mouth daily as needed       pregabalin (LYRICA) 150 MG capsule Take 1 capsule by mouth 3 times daily       No current facility-administered medications for this visit.       Allergies   Allergen Reactions     Iodine Hives, Itching and Rash     Pt allergic to iodine/ shellfish , contrast dyes.     Pt allergic to iodine/ shellfish , contrast dyes.     Trazodone Other (See Comments)     priapsim     Amoxicillin      Patient does not what type of reaction/happened as a child     Citrus Nausea     Methylcellulose      Shellfish-Derived Products Nausea and Nausea and Vomiting     Other Reaction(s): GI Upset, Unknown       Lab on 02/06/2025   Component Date Value Ref Range Status     Sodium 02/06/2025 138  135 - 145 mmol/L Final     Potassium 02/06/2025 3.8  3.4 - 5.3 mmol/L Final     Carbon Dioxide (CO2) 02/06/2025 24  22 - 29 mmol/L Final     Anion Gap 02/06/2025 13  7 - 15 mmol/L Final     Urea Nitrogen 02/06/2025 17.8  6.0 - 20.0 mg/dL Final     Creatinine 02/06/2025 1.06  0.67 - 1.17 mg/dL Final     GFR Estimate 02/06/2025 90  >60 mL/min/1.73m2 Final    eGFR calculated using 2021 CKD-EPI equation.     Calcium 02/06/2025 9.2  8.8 - 10.4 mg/dL Final     Chloride 02/06/2025 101  98 - 107 mmol/L Final     Glucose 02/06/2025 125 (H)  70 - 99 mg/dL Final     Alkaline Phosphatase 02/06/2025 78  40 - 150 U/L Final     AST 02/06/2025 20  0 - 45 U/L Final     ALT 02/06/2025 12  0 - 70 U/L Final     Protein Total 02/06/2025 7.3  6.4 - 8.3 g/dL Final     Albumin 02/06/2025 4.4  3.5 - 5.2 g/dL Final     Bilirubin Total 02/06/2025 0.4  <=1.2 mg/dL Final     Patient Fasting > 8hrs? 02/06/2025 Yes   Final     Vitamin A 02/06/2025 1.13  0.30 - 1.20 mg/L Final     Retinol Palmitate 02/06/2025 0.03  0.00 - 0.10 mg/L Final     Vitamin A Interp 02/06/2025 Normal    Final      This test was developed and its performance characteristics   determined by [facility]. It has not been cleared or   approved by the US Food and Drug Administration. This test   was performed in a CLIA certified laboratory and is   intended for clinical purposes.  Performed By: [facility]  [address] [place], [place] [id number]  : Levy Lucas MD, PhD  CLIA Number: [id number]     Vitamin E 02/06/2025 12.9  5.5 - 18.0 mg/L Final      This test was developed and its performance characteristics   determined by [facility]. It has not been cleared or   approved by the US Food and Drug Administration. This test   was performed in a CLIA certified laboratory and is   intended for clinical purposes.     Vitamin E Gamma 02/06/2025 0.9  0.0 - 6.0 mg/L Final    Performed By: [facility]  [address] [place], [place] [id number]  : Levy Lucas MD, PhD  CLIA Number: [id number]     Estimated Average Glucose 02/06/2025 131 (H)  <117 mg/dL Final     Hemoglobin A1C 02/06/2025 6.2 (H)  <5.7 % Final    Normal <5.7%   Prediabetes 5.7-6.4%    Diabetes 6.5% or higher     Note: Adopted from ADA consensus guidelines.     Lipase 02/06/2025 32  13 - 60 U/L Final     Amylase 02/06/2025 90  28 - 100 U/L Final     Cholesterol 02/06/2025 169  <200 mg/dL Final     Triglycerides 02/06/2025 335 (H)  <150 mg/dL Final     Direct Measure HDL 02/06/2025 42  >=40 mg/dL Final     LDL Cholesterol Calculated 02/06/2025 60  <100 mg/dL Final     Non HDL Cholesterol 02/06/2025 127  <130 mg/dL Final     Patient Fasting > 8hrs? 02/06/2025 Yes   Final     25 OH Vitamin D2 02/06/2025 <5  ug/L Final     25 OH Vitamin D3 02/06/2025 25  ug/L Final     25 OH Vit D Total 02/06/2025 <30  20 - 75 ug/L Final    Season, race, dietary intake, and treatment affect the concentration of 25-hydroxy-Vitamin D. Values may decrease during winter months and increase during summer months.  Values 20-29 ug/L may indicate Vitamin D insufficiency and values <20 ug/L may indicate Vitamin D deficiency.     CEA 02/06/2025 1.1  ng/mL Final    Nonsmoker (past/never) <=5.0 ng/mL   Current smoker <=6.5 ng/mL     This result is obtained using the Roche Elecsys CEA method on the ele e801 immunoassay analyzer. Results obtained with different assay methods or kits cannot be used interchangeably.     Cancer Antigen 19-9 02/06/2025 14  <=35 U/mL Final    INTERPRETIVE INFORMATION: Cancer Antigen-GI (CA 19-9)    This test uses Roche CA 19-9 electrochemiluminescent   immunoassay. Results obtained with different test methods   or kits cannot be used interchangeably. CA 19-9 value is   useful in monitoring pancreatic, hepatobiliary, gastric,   hepatocellular, and colorectal cancer. CA 19-9 value,   regardless of level, should not be interpreted as absolute   evidence of the presence or absence of malignant disease.  Performed By: [facility]  [address] [place] [address]  : Levy Lucas MD, PhD  CLIA Number: [id number]     Glutamic Acid Decarboxylase Antibo* 02/06/2025 <5.0  0.0 - 5.0 IU/mL Final    INTERPRETIVE INFORMATION:  Glutamic Acid Decarboxylase   Antibody    A value greater than 5.0 IU/mL is considered positive for   Glutamic Acid Decarboxylase Antibody (NOAH Ab). This assay   is intended for the semi-quantitative determination of the   NOAH Ab in human serum. Results should be interpreted within   the context of clinical symptoms.  Performed By: [facility]  [address] [place] [address]  : Levy Lucas MD, PhD  CLIA Number: [id number]     Islet Cell Antibody IgG 02/06/2025 <1:4  <1:4 Final    INTERPRETIVE INFORMATION: Islet Cell Ab, IgG    Islet cell antibodies (ICAs) are associated with type 1   diabetes (TID), an autoimmune endocrine disorder. ICAs may   be present years before the onset of clinical symptoms. To    calculate Juvenile Diabetes Foundation (JDF) units:   multiply the titer x 5 (1:8  8 x 5 = 40 JDF Units).    This test was developed and its performance characteristics   determined by [facility]. It has not been cleared or   approved by the US Food and Drug Administration. This test   was performed in a CLIA certified laboratory and is   intended for clinical purposes.  Performed By: [facility]  [address] [place], [place] [id number]  : Levy Lucas MD, PhD  CLIA Number: [id number]     Insulin Antibodies 02/06/2025 <0.4  0.0 - 0.4 U/mL Final    INTERPRETIVE INFORMATION: Insulin Antibody    A value greater than 0.4 Kronus Units/mL is considered   positive for Insulin Antibody. Kronus units are arbitrary.   Kronus Units = U/mL.    This assay is intended for the semi-quantitative   determination of antibodies to endogenous insulin or   antibodies to exogenous insulin in human serum. Antibodies   to exogenous insulin therapies may be detected using this   method. The magnitude of the measured result is not related   to disease progression. Results should be interpreted   within the context of clinical symptoms.  Performed By: [facility]  [address] [place] [address]  : Levy Lucas MD, PhD  CLIA Number: [id number]     C Peptide 02/06/2025 2.2  0.9 - 6.9 ng/mL Final    Reference range applies to fasting specimens.     Patient Fasting > 8hrs? 02/06/2025 Yes   Final     Iron 02/06/2025 64  61 - 157 ug/dL Final     Iron Binding Capacity 02/06/2025 376  240 - 430 ug/dL Final     Iron Sat Index 02/06/2025 17  15 - 46 % Final     Ferritin 02/06/2025 111  31 - 409 ng/mL Final     Hepatitis C Antibody 02/06/2025 Nonreactive  Nonreactive Final    A nonreactive screening test result does not exclude the possibility of exposure to or infection with HCV. Nonreactive screening test results in individuals with prior exposure to HCV may be due  to antibody levels below the limit of detection of this assay or lack of reactivity to the HCV antigens used in this assay. Patients with recent HCV infections (<3 months from time of exposure) may have false-negative HCV antibody results due to the time needed for seroconversion (average of 8 to 9 weeks).     Cotinine Confirm 02/06/2025 <15  ng/mL Final     Nicotine Confirmation Urine 02/06/2025 <15  ng/mL Final    Comment: INTERPRETIVE INFORMATION: Nicotine and Metabolites,                             Urine, Quantitative    Methodology: Quantitative Liquid Chromatography-Tandem Mass   Spectrometry    Positive cutoff:  Nicotine  15 ng/mL  Cotinine  15 ng/mL  3-OH-Cotinine 50 ng/mL  Anabasine        5 ng/mL    For medical purposes only; not valid for forensic use.     This test is designed to evaluate recent use of   nicotine-containing products.  Passive and active exposure   cannot be discriminated definitively, although a cutoff of   100 ng/mL cotinine is frequently used for surgery   qualification purposes.  For smoking cessation programs or   compliance testing, the absence of expected drug(s) and/or   drug metabolite(s) may indicate non-compliance,   inappropriate timing of specimen collection relative to   drug administration, poor drug absorption,   diluted/adulterated urine, or limitations of testing. The   concentration value must be greater than or equal to the   cutoff to be reported as                            positive. Anabasine is included as   a biomarker of tobacco use, versus nicotine replacement.    Interpretive questions should be directed to the   laboratory.     This test was developed and its performance characteristics   determined by [facility]. It has not been cleared or   approved by the US Food and Drug Administration. This test   was performed in a CLIA certified laboratory and is   intended for clinical purposes.  Performed By: [facility]  [address] [place] [address]  [place] [address]  : Levy Lucas MD, PhD  IA Number: [id number]     4-JZ-Xusmulns, Urn, Quant 02/06/2025 <50  ng/mL Final     Anabasine, Urn, Quant 02/06/2025 <5  ng/mL Final     PEth 16:0/18:1 (POPEth) 02/06/2025 <10  ng/mL Final    PEth 16:0/18:1 (POPEth)  Less than 10 ng/mL............Not detected  Less than 20 ng/mL............Abstinence or light alcohol   consumption  20 - 200 ng/mL................Moderate alcohol consumption  Greater than 200 ng/mL........Heavy alcohol consumption or   chronic alcohol use    (Reference: JANELL Sam and MARGARET Briscoe 2018 J. Forensic Sci)     PEth 16:0/18:2 (PLPEth) 02/06/2025 <10  ng/mL Final    Reference ranges are not well established.     EER Phosphatidylethanol (PETH) 02/06/2025 See Note   Final    Authorized individuals can access the [employer] Enhanced Report  with an [employer] Connect account using the following link.     Your local lab can assist you in obtaining the patient  report if you don't have a Connect account.     [email or web address]     PEth Interpretation 02/06/2025 See Comment   Final    Comment: Phosphatidylethanol (PEth) is a group of phospholipids   formed in the presence of ethanol, phospholipase D and   phosphatidylcholine. PEth is known to be a direct alcohol   biomarker. The predominant PEth homologues are PEth   16:0/18:1 (POPEth) and PEth 16:0/18:2 (PLPEth), which   account for 37-46% and 26-28% of the total PEth homologues,   respectively. PEth is incorporated into the phospholipid   membrane of red blood cells and has a general half-life of   4-10 days and a window of detection of 2-4 weeks. However,   the window of detection is longer in individuals who   chronically or excessively consume alcohol. The limit of   quantification is 10 ng/mL. Serial monitoring of PEth may   be helpful in monitoring alcohol abstinence over time. PEth   results should be interpreted in the context of the   patient's clinical  and behavioral history.  Patients with advanced liver disease may have falsely   elevated PEth concentrations (Elizabeth DORANTES et al 2018,   Alcoholism Clinical &                            Experimental Research).    This test was developed and its performance characteristics   determined by [facility]. It has not been cleared or   approved by the U.S. Food and Drug Administration. This   test was performed in a CLIA-certified laboratory and is   intended for clinical purposes.  Performed By: [facility]  [address] [place] [address]  : Levy Lucas MD, PhD  CLIA Number: [id number]     WBC Count 02/06/2025 8.9  4.0 - 11.0 10e3/uL Final     RBC Count 02/06/2025 4.80  4.40 - 5.90 10e6/uL Final     Hemoglobin 02/06/2025 13.9  13.3 - 17.7 g/dL Final     Hematocrit 02/06/2025 42.0  40.0 - 53.0 % Final     MCV 02/06/2025 88  78 - 100 fL Final     MCH 02/06/2025 29.0  26.5 - 33.0 pg Final     MCHC 02/06/2025 33.1  31.5 - 36.5 g/dL Final     RDW 02/06/2025 13.2  10.0 - 15.0 % Final     Platelet Count 02/06/2025 210  150 - 450 10e3/uL Final     % Neutrophils 02/06/2025 67  % Final     % Lymphocytes 02/06/2025 23  % Final     % Monocytes 02/06/2025 7  % Final     % Eosinophils 02/06/2025 2  % Final     % Basophils 02/06/2025 1  % Final     % Immature Granulocytes 02/06/2025 0  % Final     NRBCs per 100 WBC 02/06/2025 0  <1 /100 Final     Absolute Neutrophils 02/06/2025 6.0  1.6 - 8.3 10e3/uL Final     Absolute Lymphocytes 02/06/2025 2.0  0.8 - 5.3 10e3/uL Final     Absolute Monocytes 02/06/2025 0.6  0.0 - 1.3 10e3/uL Final     Absolute Eosinophils 02/06/2025 0.2  0.0 - 0.7 10e3/uL Final     Absolute Basophils 02/06/2025 0.1  0.0 - 0.2 10e3/uL Final     Absolute Immature Granulocytes 02/06/2025 0.0  <=0.4 10e3/uL Final     Absolute NRBCs 02/06/2025 0.0  10e3/uL Final     C Peptide 02/06/2025 6.0  0.9 - 6.9 ng/mL Final    Reference range applies to fasting specimens.     Patient  "Fasting > 8hrs? 02/06/2025 No   Final     Glucose 02/06/2025 184 (H)  70 - 99 mg/dL Final     Patient Fasting > 8hrs? 02/06/2025 No   Final     C Peptide 02/06/2025 6.3  0.9 - 6.9 ng/mL Final    Reference range applies to fasting specimens.     Patient Fasting > 8hrs? 02/06/2025 Yes   Final     Glucose 02/06/2025 122 (H)  70 - 99 mg/dL Final     Patient Fasting > 8hrs? 02/06/2025 Yes   Final       PHYSICAL EXAM:  Objective   Ht 1.829 m (6' 0.01\")   Wt 127 kg (280 lb)   BMI 37.97 kg/m           Vitals:  No vitals were obtained today due to virtual visit.    Physical Exam   GENERAL: alert and no distress  EYES: Eyes grossly normal to inspection.  No discharge or erythema, or obvious scleral/conjunctival abnormalities.  RESP: No audible wheeze, cough, or visible cyanosis.    SKIN: Visible skin clear. No significant rash, abnormal pigmentation or lesions.  NEURO: Cranial nerves grossly intact.  Mentation and speech appropriate for age.  PSYCH: Appropriate affect, tone, and pace of words    Sleeve Gastrectomy: Risks and Side Effects    The complications or risks of surgery include but are not limited to: death, heart attack, infection in the surgical site (wound infection), abdomen (abscess), bladder (urinary tract infection), lungs (pneumonia), clots in legs (deep vein thrombosis) or lungs (pulmonary emboli),  injury to the bowels or other organs, bowel obstruction, hernia at the incision and gastrointestional bleeding.    More specific risks related to vertical sleeve gastrectomy were detailed at the bariatric informational seminar and include the following: leak at the vertical sleeve staple line, leak at the anastomoses,  nausea, vomiting, and dehydration for several months,  adhesions causing bowel obstruction, rapid weight loss causing a higher rate of gallstone formation during the first 6 months after surgery, decreased absorption of vitamins and protein because of the reduced stomach size, weight regain if " inappropriate food intake occurs, stricture, injury to other organs, hernia,  and ulcers.       Side effects of bariatric surgery include but are not limited to: abdominal pain, cramping, bloating, constipation, nausea, vomiting, diarrhea, difficulty swallowing,  dehydration, hair loss, excess skin, protein, iron and vitamin deficiencies, heartburn, transfer of addictions, increased anxiety and worsening depression.       I emphasized exercise and activity behavior along with appropriate food choice as the main foundation for weight loss with surgery providing surgical reinforcement of the appropriate behavior set.        Review of general surgery weight loss process    1. Complete preoperative requirements, including weight loss.  Final weight check to confirm MANDATORY weight loss requirement must be documented on a clinic scale.    2. Discuss prior authorization with .    3. History and physical evaluation by PCP of PAC clinic within 30 days of surgery date, preoperative class, and weight check (weigh-in visit) to be scheduled by patient.  Pre-anesthesia clinic for risk evaluation to be scheduled by anesthesia clinic.    4. We cannot guarantee that patient will qualify for surgery unless all preoperative requirements are met, prior authorization from primary insurance company is granted, and insurance changes do not occur.    5. It is possible for patients to regain all weight after weight loss surgery unless they follow guidelines prescribed by our bariatric center.    6. All patients with gastrointestinal complaints after weight loss surgery must have complaints conveyed to the bariatric team for appropriate treatment.    7. Vitamin deficiencies may develop post-bariatric surgery and annual laboratory testing should be performed.    8. Persistent nausea/vomiting after bariatric surgery entails risk of thiamine deficiency and should be treated early.  Vitamin B12 deficiency may develop,  especially after gastric bypass surgery and must be recognized.        If you have any questions about our plans please don't hesitate to contact me.    Sincerely,    Cassidy Cruz PA-C      20 minutes spent by me on the date of the encounter doing chart review, history and exam, documentation and further activities per the note        Virtual Visit Details    Type of service:  Video Visit   Video Start Time: 1102  Video End Time:11:21 AM    Originating Location (pt. Location): Home    Distant Location (provider location):  Off-site  Platform used for Video Visit: Sheri    The longitudinal plan of care for the diagnosis(es)/condition(s) as documented were addressed during this visit. Due to the added complexity in care, I will continue to support Abiel in the subsequent management and with ongoing continuity of care.          Again, thank you for allowing me to participate in the care of your patient.      Sincerely,    Cassidy Cruz PA-C

## 2025-05-24 ENCOUNTER — HEALTH MAINTENANCE LETTER (OUTPATIENT)
Age: 40
End: 2025-05-24

## 2025-06-04 ENCOUNTER — TELEPHONE (OUTPATIENT)
Dept: ENDOCRINOLOGY | Facility: CLINIC | Age: 40
End: 2025-06-04
Payer: COMMERCIAL

## 2025-06-04 NOTE — TELEPHONE ENCOUNTER
Left Voicemail (1st Attempt) for the patient to call back and schedule the following:    Appointment type: return   Provider: Cassidy Cruz  Return date:  August 2025  Specialty phone number: [phone]  Additional appointment(s) needed:   Additonal Notes:          *Follow up end of Aug or beg of Sept with Cassidy  Continue monthly with YISSEL

## 2025-06-12 NOTE — PROGRESS NOTES
"Video-Visit Details    Type of service:  Video Visit    Video Start Time: 11:29 AM   Video End Time: 11:42 AM     Originating Location (pt. Location): Home    Distant Location (provider location):  Offsite (providers home) [facility] [place]     [facility]    Return Bariatric Nutrition Consultation Note    Reason For Visit: Nutrition Assessment    Abiel Weeks is a 40 year old presenting today for return bariatric nutrition consult.   Patient is interested in weight loss surgery with Dr. Stroud  expected surgery in TBD.  Patient is accompanied by self.  This is patient's 5th of 3 required nutrition visits prior to surgery. Completed Weight Loss Surgery Nutrition Class on 2/28/25.    Working towards weight loss prior to possible pancreatectomy.     Pt referred by MYKE Segura on April 2, 2025.    CO-MORBIDITIES OF OBESITY INCLUDE:        2/19/2025    10:22 PM   --   I have the following health issues associated with obesity Type II Diabetes    Heart Disease    High Blood Pressure    Sleep Apnea    GERD (Reflux)       SUPPORT:      2/19/2025    10:22 PM   Support System Reviewed With Patient   Who do you have in your support network that can be available to help you for the first 2 weeks after surgery? My mother.   Who can you count on for support throughout your weight loss surgery journey? My mother, and the rest of my family.       ANTHROPOMETRICS:  Initial consult weight: 297 lb on 2/20/25   Estimated body mass index is 37.97 kg/m  as calculated from the following:    Height as of 5/22/25: 1.829 m (6' 0.01\").    Weight as of 5/22/25: 127 kg (280 lb).  Current Weight: 280 lb per patient report - does not have a more recent updated weight     Required weight loss goal pre-op: -10 lbs from initial consult weight (goal weight 287 lbs or less before surgery) - met         2/19/2025    10:22 PM   --   I have tried the following methods to lose " weight Watching portions or calories    Physician directed program           2/19/2025    10:22 PM   Weight Loss Questions Reviewed With Patient   How long have you been overweight? Since late 20's to early 40's     MEDICATION FOR WEIGHT LOSS:   Metformin   Not a candidate for GLP1 due to hx of pancreatitis   Topiramate - 75 mg daily - eating less, thoughts around food have diminished     VITAMIN/MINERAL SUPPLEMENTS: ROHIT Narvaez    NUTRITION HISTORY:  Food allergies: None   Food intolerances: citrus - can cause GI intolerance   Previous experience with diet modification for weight loss: trying to make mindful choices.   Barriers to lifestyle change: hard for patient to exercise due to chronic pancreatitis.     Trying to portion control more. Making more mindful choices with eating. Follows a low fat/low sodium diet.     Patient avoids meat due to it being a trigger food.     Recent Diet Recall:  Breakfast: Doesn't feel well in the morning when he wakes up. Tries to force himself to have a protein shake (this is protein powder added to skim milk).   Lunch: Cereal with skim milk OR vegetables (celery, carrots, broccoli)   Dinner: Cereal with skim milk OR vegetables (celery, carrots, broccoli)   Snacks: Triscuits, pretzels   Beverages: mostly water, once a week will have a diet soda  Alcohol: avoids alcohol completely     May 2025: Difficult to get a true understanding of what eating looks like for patient. Patient reports recently his diet is a protein shake for breakfast, big salad for lunch - caesar salad with no protein, snack - high protein Cheerios    Very limited food selections available to patient due to his chronic pancreatitis. Patient reports his body can't tolerate a lot of foods due to his chronic pancreatitis. All meats give him problems - severe cramping, abdominal pain. Fruits and vegetables are ok for the most part. Starchy beans are ok. Difficult to get protein goal met so therefore he drinks protein  shakes and eats high protein Cheerios.     Per documentation from recent neuropsych visit and transplant RD working with patient, patient reports eating a lot of carbohydrate snack like foods (ex. Triscuits, Lon Charms, Cheez Its, Hawaiian rolls, also likes SF popsicles).    June 2025: Saw Cassidy last month. Has his sleep medicine appointment scheduled for later August. Also needs letter of support from psych, cardiology, PCP and has a few remaining labs to get done.     Patient has been dealing with some like stressors. Patient's mother who he lives with recently had to quit working due to being diagnosed with breast cancer. With this change they are now faced with limited finances. This has made it challenging at times to afford some of the groceries he was previously getting. Patient lives in [place], [place] and is aware that they have 3 food shelves in the city that he is utilizing as a resource. In regards to eating, patient's diet remains limited with his chronic pancreatitis. Trying to make sure he has some protein sources that he tolerates. Will have a Pure Protein shake on occasion or high protein cereal. Suspect patient has a diet heavy in carbohydrates. Reports he eats a lot of vegetables.     Patient still finding it hard to separate liquids from meal times. Especially struggles with not drinking fluids for at least 30 minutes prior to eating, mostly due to just forgetting.     Hydration - drinks water or skim milk. 1-2x a week may have a soda or coffee.         2/19/2025    10:22 PM   Recall Diet Questions Reviewed With Patient   Describe what you typically consume for breakfast (typical or most recent) Cereal   Describe what you typically consume for lunch (typical or most recent) Some kind of vegetable with light ranch.   Describe what you typically consume for supper (typical or most recent) Tonight I had an Ensure for dinner.  It kind of varies depending on how my pancreatitis is.   Describe what  you typically consume as snacks (typical or most recent) Pretzels, low fat triscuits, low sugar popsicles.   How many ounces of water, or other low calorie drinks, do you drink daily (8 oz=1 glass)? 64 oz or more   How many ounces of caffeine (coffee, tea, pop) do you drink daily (8 oz=1 glass)? 8 oz   How many ounces of carbonated (pop, beer, sparkling water) drinks do you drinky daily (8 oz=1 glass)? 8 oz   How many ounces of juice, pop, sweet tea, sports drinks, protein drinks, other sweetened drinks, do you drink daily (8 oz=1 glass)? 16 oz   How many ounces of milk do you drink daily (8 oz=1 glass) 16 oz   Please indicate the type of milk 1%    skim   How often do you drink alcohol? Never           2/19/2025    10:22 PM   Eating Habits   What foods do you crave? I'd love a cheeseburger but with my current health issues that would be unwise.           2/19/2025    10:22 PM   Dining Out History Reviewed With Patient   How often do you dine out? Rarely.   Where do you dine out? (select all that apply) sit-down restaurants   What types of food do you order when you dine out? Usually I have a hard time finding something that agrees with my diet.  I order a salad or vegetables.     EXERCISE:  Gets up and moves around throughout the day if able to.         2/19/2025    10:22 PM   --   How often do you exercise? Less than 1 time per week   What is the duration of your exercise (in minutes)? 10 Minutes   What types of exercise do you do? walking   What keeps you from being more active? I am as active as I can possbily be    Pain     ADDITIONAL INFORMATION:  Mother is a good support for patient.     NUTRITION DIAGNOSIS:  Obesity r/t long history of positive energy balance aeb BMI >30 kg/m2.    INTERVENTION:  Intervention Provided/Education Provided on post-op diet guidelines, vitamins/minerals essential post-operatively, GI anatomy of bariatric surgeries, ways to help prepare for post-op diet guidelines pre-operatively,  "portion/calorie-control, mindful eating and sources of protein.  Patient demonstrates understanding.     Personal barriers to making and continuing required life changes have been identified, and strategies to overcome those barriers have been recommended AND family and social supports have been assessed and strategies to strengthen those supports have been recommended.    Provided pt with list of goals, RD contact information and resources listed below via Tixers.             2/19/2025    10:22 PM   Questions Reviewed With Patient   How ready are you to make changes regarding your weight? Number 1 = Not ready at all to make changes up to 10 = very ready. 8   How confident are you that you can change? 1 = Not confident that you will be successful making changes up to 10 = very confident. 8     Expected Engagement: fair-good    Goals for today's visit:   1) Focus on getting at least 60 grams of protein daily.   2) Try to incorporate more fruits and vegetables where you are able to.   3) Schedule lab visit to get remaining labs completed.     GOALS:  Relating To Eating:  Eat slowly (20-30 minutes per meal), chewing foods well (25 chews per bite/applesauce consistency)  Eat 3 meals per day  Consume 60-90 g protein per day    Relating to beverages:  Reduce caffeine/carbonation/calorie containing beverages  Separate fluids from meals by 30 minutes before, during, and after eating  Drink 48-64 ounces of fluid per day    Relating to activity:  Increase activity as able    Eating Well on a Budget: https://www.Adapt/513049.pdf    Too Good To Go:   An sabrina that lets users buy discounted \"Surprise Bags\" of unsold food from local stores, bakeries, and restaurants. The bags typically contain a variety of food/meals and help to reduce food waste while saving money.    [employer] [facility] for food resources   https://www.hungersolutions.org/    Follow Up: July 17.     Time spent with patient: 13 minutes.  Bre Reyes, " YISSEL, CHAKA

## 2025-06-14 ENCOUNTER — HEALTH MAINTENANCE LETTER (OUTPATIENT)
Age: 40
End: 2025-06-14

## 2025-06-17 ENCOUNTER — VIRTUAL VISIT (OUTPATIENT)
Dept: ENDOCRINOLOGY | Facility: CLINIC | Age: 40
End: 2025-06-17
Payer: COMMERCIAL

## 2025-06-17 DIAGNOSIS — E66.9 OBESITY: ICD-10-CM

## 2025-06-17 DIAGNOSIS — E11.22 TYPE 2 DIABETES MELLITUS WITH CHRONIC KIDNEY DISEASE, WITHOUT LONG-TERM CURRENT USE OF INSULIN, UNSPECIFIED CKD STAGE (H): ICD-10-CM

## 2025-06-17 DIAGNOSIS — G47.33 OSA (OBSTRUCTIVE SLEEP APNEA): ICD-10-CM

## 2025-06-17 DIAGNOSIS — K86.1 CHRONIC RECURRENT PANCREATITIS (H): ICD-10-CM

## 2025-06-17 DIAGNOSIS — Z71.3 NUTRITIONAL COUNSELING: Primary | ICD-10-CM

## 2025-06-17 PROCEDURE — 97803 MED NUTRITION INDIV SUBSEQ: CPT | Mod: 95

## 2025-06-17 PROCEDURE — 1125F AMNT PAIN NOTED PAIN PRSNT: CPT | Mod: 95

## 2025-06-17 PROCEDURE — 99207 PR NO CHARGE LOS: CPT | Mod: 95

## 2025-06-17 ASSESSMENT — PAIN SCALES - GENERAL: PAINLEVEL_OUTOF10: MODERATE PAIN (6)

## 2025-06-17 NOTE — LETTER
6/17/2025       RE: Abiel Weeks  [address]  [place] [address]     Dear Colleague,    Thank you for referring your patient, Abiel Weeks, to the [facility] [place] [facility]. Please see a copy of my visit note below.    Video-Visit Details    Type of service:  Video Visit    Video Start Time: 11:29 AM   Video End Time: 11:42 AM     Originating Location (pt. Location): Home    Distant Location (provider location):  Offsite (providers home) [facility] [place]     Platform used for Video Visit: [employer]    Return Bariatric Nutrition Consultation Note    Reason For Visit: Nutrition Assessment    Abiel Weeks is a 40 year old presenting today for return bariatric nutrition consult.   Patient is interested in weight loss surgery with Dr. Stroud  expected surgery in D.  Patient is accompanied by self.  This is patient's 5th of 3 required nutrition visits prior to surgery. Completed Weight Loss Surgery Nutrition Class on 2/28/25.    Working towards weight loss prior to possible pancreatectomy.     Pt referred by MYKE Segura on April 2, 2025.    CO-MORBIDITIES OF OBESITY INCLUDE:        2/19/2025    10:22 PM   --   I have the following health issues associated with obesity Type II Diabetes    Heart Disease    High Blood Pressure    Sleep Apnea    GERD (Reflux)       SUPPORT:      2/19/2025    10:22 PM   Support System Reviewed With Patient   Who do you have in your support network that can be available to help you for the first 2 weeks after surgery? My mother.   Who can you count on for support throughout your weight loss surgery journey? My mother, and the rest of my family.       ANTHROPOMETRICS:  Initial consult weight: 297 lb on 2/20/25   Estimated body mass index is 37.97 kg/m  as calculated from the following:    Height as of 5/22/25: 1.829 m (6'  "0.01\").    Weight as of 5/22/25: 127 kg (280 lb).  Current Weight: 280 lb per patient report - does not have a more recent updated weight     Required weight loss goal pre-op: -10 lbs from initial consult weight (goal weight 287 lbs or less before surgery) - met         2/19/2025    10:22 PM   --   I have tried the following methods to lose weight Watching portions or calories    Physician directed program           2/19/2025    10:22 PM   Weight Loss Questions Reviewed With Patient   How long have you been overweight? Since late 20's to early 40's     MEDICATION FOR WEIGHT LOSS:   Metformin   Not a candidate for GLP1 due to hx of pancreatitis   Topiramate - 75 mg daily - eating less, thoughts around food have diminished     VITAMIN/MINERAL SUPPLEMENTS: ROHIT Narvaez    NUTRITION HISTORY:  Food allergies: None   Food intolerances: citrus - can cause GI intolerance   Previous experience with diet modification for weight loss: trying to make mindful choices.   Barriers to lifestyle change: hard for patient to exercise due to chronic pancreatitis.     Trying to portion control more. Making more mindful choices with eating. Follows a low fat/low sodium diet.     Patient avoids meat due to it being a trigger food.     Recent Diet Recall:  Breakfast: Doesn't feel well in the morning when he wakes up. Tries to force himself to have a protein shake (this is protein powder added to skim milk).   Lunch: Cereal with skim milk OR vegetables (celery, carrots, broccoli)   Dinner: Cereal with skim milk OR vegetables (celery, carrots, broccoli)   Snacks: Triscuits, pretzels   Beverages: mostly water, once a week will have a diet soda  Alcohol: avoids alcohol completely     May 2025: Difficult to get a true understanding of what eating looks like for patient. Patient reports recently his diet is a protein shake for breakfast, big salad for lunch - caesar salad with no protein, snack - high protein Cheerios    Very limited food " selections available to patient due to his chronic pancreatitis. Patient reports his body can't tolerate a lot of foods due to his chronic pancreatitis. All meats give him problems - severe cramping, abdominal pain. Fruits and vegetables are ok for the most part. Starchy beans are ok. Difficult to get protein goal met so therefore he drinks protein shakes and eats high protein Cheerios.     Per documentation from recent neuropsych visit and transplant RD working with patient, patient reports eating a lot of carbohydrate snack like foods (ex. Triscuits, Lon Charms, Cheez Its, Hawaiian rolls, also likes SF popsicles).    June 2025: Saw Cassidy last month. Has his sleep medicine appointment scheduled for later August. Also needs letter of support from psych, cardiology, PCP and has a few remaining labs to get done.     Patient has been dealing with some like stressors. Patient's mother who he lives with recently had to quit working due to being diagnosed with breast cancer. With this change they are now faced with limited finances. This has made it challenging at times to afford some of the groceries he was previously getting. Patient lives in [place], [place] and is aware that they have 3 food shelves in the city that he is utilizing as a resource. In regards to eating, patient's diet remains limited with his chronic pancreatitis. Trying to make sure he has some protein sources that he tolerates. Will have a Pure Protein shake on occasion or high protein cereal. Suspect patient has a diet heavy in carbohydrates. Reports he eats a lot of vegetables.     Patient still finding it hard to separate liquids from meal times. Especially struggles with not drinking fluids for at least 30 minutes prior to eating, mostly due to just forgetting.     Hydration - drinks water or skim milk. 1-2x a week may have a soda or coffee.         2/19/2025    10:22 PM   Recall Diet Questions Reviewed With Patient   Describe what you typically  consume for breakfast (typical or most recent) Cereal   Describe what you typically consume for lunch (typical or most recent) Some kind of vegetable with light ranch.   Describe what you typically consume for supper (typical or most recent) Tonight I had an Ensure for dinner.  It kind of varies depending on how my pancreatitis is.   Describe what you typically consume as snacks (typical or most recent) Pretzels, low fat triscuits, low sugar popsicles.   How many ounces of water, or other low calorie drinks, do you drink daily (8 oz=1 glass)? 64 oz or more   How many ounces of caffeine (coffee, tea, pop) do you drink daily (8 oz=1 glass)? 8 oz   How many ounces of carbonated (pop, beer, sparkling water) drinks do you drinky daily (8 oz=1 glass)? 8 oz   How many ounces of juice, pop, sweet tea, sports drinks, protein drinks, other sweetened drinks, do you drink daily (8 oz=1 glass)? 16 oz   How many ounces of milk do you drink daily (8 oz=1 glass) 16 oz   Please indicate the type of milk 1%    skim   How often do you drink alcohol? Never           2/19/2025    10:22 PM   Eating Habits   What foods do you crave? I'd love a cheeseburger but with my current health issues that would be unwise.           2/19/2025    10:22 PM   Dining Out History Reviewed With Patient   How often do you dine out? Rarely.   Where do you dine out? (select all that apply) sit-down restaurants   What types of food do you order when you dine out? Usually I have a hard time finding something that agrees with my diet.  I order a salad or vegetables.     EXERCISE:  Gets up and moves around throughout the day if able to.         2/19/2025    10:22 PM   --   How often do you exercise? Less than 1 time per week   What is the duration of your exercise (in minutes)? 10 Minutes   What types of exercise do you do? walking   What keeps you from being more active? I am as active as I can possbily be    Pain     ADDITIONAL INFORMATION:  Mother is a good  support for patient.     NUTRITION DIAGNOSIS:  Obesity r/t long history of positive energy balance aeb BMI >30 kg/m2.    INTERVENTION:  Intervention Provided/Education Provided on post-op diet guidelines, vitamins/minerals essential post-operatively, GI anatomy of bariatric surgeries, ways to help prepare for post-op diet guidelines pre-operatively, portion/calorie-control, mindful eating and sources of protein.  Patient demonstrates understanding.     Personal barriers to making and continuing required life changes have been identified, and strategies to overcome those barriers have been recommended AND family and social supports have been assessed and strategies to strengthen those supports have been recommended.    Provided pt with list of goals, RD contact information and resources listed below via Teedot.             2/19/2025    10:22 PM   Questions Reviewed With Patient   How ready are you to make changes regarding your weight? Number 1 = Not ready at all to make changes up to 10 = very ready. 8   How confident are you that you can change? 1 = Not confident that you will be successful making changes up to 10 = very confident. 8     Expected Engagement: fair-good    Goals for today's visit:   1) Focus on getting at least 60 grams of protein daily.   2) Try to incorporate more fruits and vegetables where you are able to.   3) Schedule lab visit to get remaining labs completed.     GOALS:  Relating To Eating:  Eat slowly (20-30 minutes per meal), chewing foods well (25 chews per bite/applesauce consistency)  Eat 3 meals per day  Consume 60-90 g protein per day    Relating to beverages:  Reduce caffeine/carbonation/calorie containing beverages  Separate fluids from meals by 30 minutes before, during, and after eating  Drink 48-64 ounces of fluid per day    Relating to activity:  Increase activity as able    Eating Well on a Budget: https://www.Graphdive/790677.pdf    Too Good To Go:   An sabrina that lets users  "buy discounted \"Surprise Bags\" of unsold food from local stores, bakeries, and restaurants. The bags typically contain a variety of food/meals and help to reduce food waste while saving money.    [employer] [facility] [place] [employer] food resources   https://www.hungerSherpany.org/    Follow Up: July 17.     Time spent with patient: 13 minutes.  Bre Reyes RD, LD      Again, thank you for allowing me to participate in the care of your patient.      Sincerely,    Bre Reyes RD    "

## 2025-06-17 NOTE — PATIENT INSTRUCTIONS
"Alejandro Beebe,     Follow-up with YISSEL on July 17.     Thank you,    Bre Reyes, YISSEL, LD  If you would like to schedule or reschedule an appointment with the RD, please call [phone]    Nutrition Goals  Relating To Eating:  Eat slowly (20-30 minutes per meal), chewing foods well (25 chews per bite/applesauce consistency)  Eat 3 meals per day  Consume 60-90 g protein per day    Relating to beverages:  Reduce caffeine/carbonation/calorie containing beverages  Separate fluids from meals by 30 minutes before, during, and after eating  Drink 48-64 ounces of fluid per day    Relating to activity:  Increase activity as able    Eating Well on a Budget: https://[email or web address]    Too Good To Go:   An sabrina that lets users buy discounted \"Surprise Bags\" of unsold food from local stores, bakeries, and restaurants. The bags typically contain a variety of food/meals and help to reduce food waste while saving money.    [place] food resources   https://www.hungersoPostcard on the Run.org/    COMPREHENSIVE WEIGHT MANAGEMENT PROGRAM  VIRTUAL SUPPORT GROUPS    At [facility], our Comprehensive Weight Management program offers on-line support groups for patients who are working on weight loss and considering, preparing for, or have had weight loss surgery.     There is no cost for this opportunity.  You are invited to attend the?Virtual Support Groups?provided by any of the following locations:    [facility] via QR Wild Teams with Roxanne Lanza RD, RN  2.   [place] via QR Wild Teams with Otis Valles, PhD, LP  3.   [place] via QR Wild Teams with Ros aIsela Wright RN  4.   [facility] via a Zoom Meeting with DEVON Fernandes-    The following Support Group information can also be found on our website:  [email or web address]      [facility]   WEIGHT LOSS SURGERY SUPPORT GROUP  The support group is a " patient-lead forum that meets monthly to share experiences, encouragement and education. It is open to those who have had weight loss surgery, are scheduled for surgery, or are considering surgery.   WHEN: 3rd Wednesday of each month from 5:00PM - 6:00PM using Microsoft Teams.   FACILITATOR: Led by Roxanne Palomo RD, LD, RN, the program's Clinical Coordinator.   TO REGISTER: Please contact the clinic via 4 the stars or call the nurse line directly at [phone] to inform our staff that you would like an invite sent to you and to let us know the email you would like the invite sent to. Prior to the meeting, a link with directions on how to join the meeting will be sent to you.    2025 Meetings, 3rd Wednesday, 5:00pm - 6:00pm    January 15: Let's Talk  February19: Let's Talk  March 19: Speaker: Santos Chandler RD, LD  April 16: Let's Talk  May 21: Speaker: Aide Angulo RD, LD  June18: Let's Talk  July 16: Let's Talk  August 20: Let's Talk  September 17: No meeting.  October 15: Speaker: Ninoska Acuña PsyD, LP  November 19: Let's Talk  December 17: Let's Talk    [facility] [place] [facility] BARIATRIC CARE SUPPORT GROUP  This is open to all pre- and post- operative bariatric surgery patients as well as their support system.   WHEN: 3rd Tuesday of each month from 6:30 PM - 8:00 PM using Microsoft Teams.   FACILITATOR: Led by Otis Valles, Ph.D who is a Licensed Psychologist with the [facility] Comprehensive Weight Management Program.   TO REGISTER: Please send an email to Otis Valles, Ph.D., LP at?[email or web address]?if you would like an invitation to the group. Prior to the meeting, a link with directions on how to join the meeting will be sent to you.    2025 Meetings, 3rd Tuesday January 21st: Open Forum  February 18th: Medications and Bariatric Surgery  Speaker: Laly Maldonado, [facility] Resident  March 18th: Open Forum  April 15th: Genetics of Obesity as  well as Q&A, Speaker: Katelyn Bingham MD, [facility] Comprehensive Weight Management Program.  May 20th: Open Forum  June 17th: Nutritional Labeling, Speaker: ELE  July 15th: Open Forum  August 19th: Open Forum  September 16th: Open Forum  October 21st: Open Forum  November 18th: Holiday Eating, Speaker: ELE  December 16th: Open Forum    [facility] POST-OPERATIVE BARIATRIC SURGERY SUPPORT GROUP  This is a support group for [facility] bariatric patients (and those external to [facility]) who have had bariatric surgery and are at least 3 months post-surgery.  WHEN: 4th Thursday of the month from 11:00 AM - 12:00 PM using Microsoft Teams.   FACILITATOR: Led by Certified Bariatric Nurse, Rosa Isela Wright RN, CBN.   TO REGISTER: Please send an email to Rosa Isela at [email or web address] if you would like an invitation to the group.  Prior to the meeting, a link with directions on how to join the meeting will be sent to you.    2025 Meetings, 4th Thursday, 11:00am - 12:pm  January 23  February 27  March 27  April 24  May 22  Majo 26  July 24  August 28  September 25  October 23  November 27: Thanksgiving Day, No meeting.      December 25: Sheldon Day, No meeting.        [facility]   HEALTHY LIFESTYLE COACHING GROUP  This is a 60 minute virtual coaching group for those who want to lead a healthier lifestyle. Come together to set goals and overcome barriers in a supportive group environment. We will address the four pillars of health: nutrition, exercise, sleep, and emotional well-being.   WHEN: 1st Friday of the month, 12:30 PM - 1:30 PM   using a Zoom meeting.     FACILITATOR: Led by National Board-Certified Health and , Rosa Isela Pastor [facility].  TO REGISTER: Please call the [employer] at [phone] to register. You will get an appointment to attend in [place]. Fifteen minutes  prior to the meeting, complete the e-check in and you will get the link to join the meeting.  There is no charge to attend this group and space is limited.  Please register for each month you wish to attend    2025 Meetings, 1st Friday, 12:30pm-1:30pm  January 3  February 7  March 7: No meeting.  April 4  May 2  Majo 6  July 4: Fourth of July Holiday, No meeting.    August 1  September 5  October 3  November 7  December 5

## 2025-07-22 NOTE — PROGRESS NOTES
"Video-Visit Details    Type of service:  Video Visit    Video Start Time: 10:31 AM   Video End Time: 10:43 AM     Originating Location (pt. Location): Home    Distant Location (provider location):  Offsite (providers home) [facility] [place]     Platform [facility] Visit: [facility]    Return Bariatric Nutrition Consultation Note    Reason For Visit: Nutrition Assessment    Abiel Weeks is a 40 year old presenting today for return bariatric nutrition consult.   Patient is interested in weight loss surgery with Dr. Stroud  expected surgery in TBD.  Patient is accompanied by self.  This is patient's 6th of 3 required nutrition visits prior to surgery. Completed Weight Loss Surgery Nutrition Class on 2/28/25.    Working towards weight loss prior to possible pancreatectomy.     Pt referred by MYKE Segura on April 2, 2025.    CO-MORBIDITIES OF OBESITY INCLUDE:        2/19/2025    10:22 PM   --   I have the following health issues associated with obesity Type II Diabetes    Heart Disease    High Blood Pressure    Sleep Apnea    GERD (Reflux)       SUPPORT:      2/19/2025    10:22 PM   Support System Reviewed With Patient   Who do you have in your support network that can be available to help you for the first 2 weeks after surgery? My mother.   Who can you count on for support throughout your weight loss surgery journey? My mother, and the rest of my family.       ANTHROPOMETRICS:  Initial consult weight: 297 lb on 2/20/25   Estimated body mass index is 37.97 kg/m  as calculated from the following:    Height as of this encounter: 1.829 m (6' 0.01\").    Weight as of 5/22/25: 127 kg (280 lb).  Current Weight: 289 lb per care everywhere note on 6/20/25.     Required weight loss goal pre-op: -10 lbs from initial consult weight (goal weight 287 lbs or less before surgery)         2/19/2025    10:22 PM   --   I have tried the following methods to lose weight Watching portions " SUBJECTIVE:   Rosanan Leung is a 28 year old female presenting with a chief complaint of   Chief Complaint   Patient presents with     Urgent Care     Vomiting     c/o dizzy and vomiting for 7 days       She is an established patient of [facility].    Patient presents today with complaints of dizziness that started about a week ago. Started while at work walking. Did have to go home that day. Described as a room spinning sensation. Did also have emesis associated with the dizziness. Has not had emesis for about 3 days, but then today had emesis again after showering. Denies blood in emesis, was undigested food. States after hot showers seems to be worse. Moving head seems to make it worse. Patient states she had been improving, but then today seemed to maybe get a little worse. Dramamine, helped mildly.  Virtual visit completed on 4/26/2020 - told to continue to monitor. Thought  vertigo.       Review of Systems   Constitutional: Negative for fever.   HENT: Negative for congestion, ear pain, rhinorrhea and sore throat.    Eyes: Positive for visual disturbance (difficulty focusing).   Respiratory: Negative for cough and shortness of breath.    Cardiovascular: Negative for chest pain.   Gastrointestinal: Positive for nausea and vomiting. Negative for abdominal pain and diarrhea.   Musculoskeletal: Negative for myalgias and neck pain.   Skin: Negative for rash.   Neurological: Positive for light-headedness. Negative for speech difficulty, weakness, numbness and headaches.   Psychiatric/Behavioral: Negative for confusion.       Past Medical History:   Diagnosis Date     NO ACTIVE PROBLEMS      Family History   Problem Relation Age of Onset     Clotting Disorder Mother      Pulmonary Embolism Mother         after surgery     Asthma Brother      Sickle Cell Anemia Cousin      Sickle Cell Anemia Maternal Aunt      Clotting Disorder Maternal Aunt         DVT ?after surgery     Clotting Disorder Maternal Aunt         after  "MVA     Current Outpatient Medications   Medication Sig Dispense Refill     levonorgestrel-ethinyl estradiol (NORDETTE) 0.15-30 MG-MCG tablet Take 1 tablet by mouth daily 84 tablet 3     meclizine (ANTIVERT) 25 MG tablet Take 1 tablet (25 mg) by mouth 3 times daily as needed for dizziness 30 tablet 0     ondansetron (ZOFRAN-ODT) 4 MG ODT tab Take 1 tablet (4 mg) by mouth every 6 hours as needed for nausea or vomiting 18 tablet 1     predniSONE (DELTASONE) 20 MG tablet Take 2 tablets (40 mg) by mouth daily for 5 days 10 tablet 0     albuterol (PROAIR HFA) 108 (90 Base) MCG/ACT inhaler Inhale 1-2 puffs into the lungs every 4 hours as needed for shortness of breath / dyspnea or wheezing (Patient not taking: Reported on 4/29/2020) 1 Inhaler 1     cholecalciferol (VITAMIN D3) 5000 units TABS tablet Take by mouth daily       fluticasone (FLOVENT HFA) 220 MCG/ACT inhaler Inhale 1 puff into the lungs 2 times daily (Patient not taking: Reported on 4/29/2020) 12 g 3     vitamin B-12 (CYANOCOBALAMIN) 100 MCG tablet Take 100 mcg by mouth daily       Social History     Tobacco Use     Smoking status: Never Smoker     Smokeless tobacco: Never Used   Substance Use Topics     Alcohol use: Yes     Frequency: Monthly or less     Drinks per session: 1 or 2     Binge frequency: Never       OBJECTIVE  /73   Pulse 87   Temp 97.8  F (36.6  C) (Oral)   Ht 1.6 m (5' 3\")   Wt 111.1 kg (245 lb)   SpO2 99%   BMI 43.40 kg/m      Physical Exam  Vitals signs and nursing note reviewed.   Constitutional:       Appearance: Normal appearance. She is well-developed and well-groomed.   HENT:      Head: Normocephalic and atraumatic.      Right Ear: Tympanic membrane, ear canal and external ear normal.      Left Ear: Tympanic membrane, ear canal and external ear normal.      Nose: Nose normal.      Mouth/Throat:      Mouth: Mucous membranes are moist.      Pharynx: Oropharynx is clear.   Eyes:      Extraocular Movements: Extraocular movements " or calories    Physician directed program           2/19/2025    10:22 PM   Weight Loss Questions Reviewed With Patient   How long have you been overweight? Since late 20's to early 40's     MEDICATION FOR WEIGHT LOSS:   Metformin   Not a candidate for GLP1 due to hx of pancreatitis   Topiramate - 75 mg daily - eating less, thoughts around food have diminished     VITAMIN/MINERAL SUPPLEMENTS: ROHIT Narvaez    NUTRITION HISTORY:  Food allergies: None   Food intolerances: citrus - can cause GI intolerance   Previous experience with diet modification for weight loss: trying to make mindful choices.   Barriers to lifestyle change: hard for patient to exercise due to chronic pancreatitis.     Trying to portion control more. Making more mindful choices with eating. Follows a low fat/low sodium diet.     Patient avoids meat due to it being a trigger food.     Recent Diet Recall:  Breakfast: Doesn't feel well in the morning when he wakes up. Tries to force himself to have a protein shake (this is protein powder added to skim milk).   Lunch: Cereal with skim milk OR vegetables (celery, carrots, broccoli)   Dinner: Cereal with skim milk OR vegetables (celery, carrots, broccoli)   Snacks: Triscuits, pretzels   Beverages: mostly water, once a week will have a diet soda  Alcohol: avoids alcohol completely     May 2025: Difficult to get a true understanding of what eating looks like for patient. Patient reports recently his diet is a protein shake for breakfast, big salad for lunch - caesar salad with no protein, snack - high protein Cheerios    Very limited food selections available to patient due to his chronic pancreatitis. Patient reports his body can't tolerate a lot of foods due to his chronic pancreatitis. All meats give him problems - severe cramping, abdominal pain. Fruits and vegetables are ok for the most part. Starchy beans are ok. Difficult to get protein goal met so therefore he drinks protein shakes and eats high  intact.      Conjunctiva/sclera: Conjunctivae normal.      Pupils: Pupils are equal, round, and reactive to light.   Neck:      Musculoskeletal: Normal range of motion and neck supple.   Cardiovascular:      Rate and Rhythm: Normal rate and regular rhythm.      Heart sounds: Normal heart sounds.   Pulmonary:      Effort: Pulmonary effort is normal.      Breath sounds: Normal breath sounds and air entry.   Lymphadenopathy:      Head:      Right side of head: No submandibular or tonsillar adenopathy.      Left side of head: No submandibular or tonsillar adenopathy.      Cervical: No cervical adenopathy.   Skin:     General: Skin is warm and dry.      Capillary Refill: Capillary refill takes less than 2 seconds.   Neurological:      Mental Status: She is alert and oriented to person, place, and time.      GCS: GCS eye subscore is 4. GCS verbal subscore is 5. GCS motor subscore is 6.      Cranial Nerves: Cranial nerves are intact.      Sensory: Sensation is intact.      Motor: Motor function is intact.      Coordination: Coordination is intact.   Psychiatric:         Attention and Perception: Attention normal.         Mood and Affect: Mood normal.         Speech: Speech normal.         Behavior: Behavior normal. Behavior is cooperative.         Thought Content: Thought content normal.         ASSESSMENT:      ICD-10-CM    1. Vertigo  R42 meclizine (ANTIVERT) 25 MG tablet     ondansetron (ZOFRAN-ODT) 4 MG ODT tab     predniSONE (DELTASONE) 20 MG tablet        Medical Decision Making:    Differential Diagnosis:  Dizziness: Vertigo, Labyrinthitis, Otitis Media, Otitis Externa, Cerumen Impaction, URI, Sinusitis, Anxiety, TIA, Stroke, Thyroid Problem.      Serious Comorbid Conditions:  Adult:  None    PLAN:  Discussed with patient that favor a vertigo v a labyrinthitis. Will treat with prednisone daily for 5 days. Will also do meclizine three times a day as needed for dizziness. Can do ondansetron every 6 hours as needed for  protein Cheerios.     Per documentation from recent neuropsych visit and transplant RD working with patient, patient reports eating a lot of carbohydrate snack like foods (ex. Triscuits, Lon Charms, Cheez Its, Hawaiian rolls, also likes SF popsicles).    June 2025: Saw [facility] last month. Has his sleep medicine appointment scheduled for later August. Also needs letter of support from psych, cardiology, PCP and has a few remaining labs to get done.     Patient has been dealing with some like stressors. Patient's mother who he lives with recently had to quit working due to being diagnosed with breast cancer. With this change they are now faced with limited finances. This has made it challenging at times to afford some of the groceries he was previously getting. Patient lives in [place], [place] and is aware that they have 3 food shelves in the city that he is utilizing as a resource. In regards to eating, patient's diet remains limited with his chronic pancreatitis. Trying to make sure he has some protein sources that he tolerates. Will have a Pure Protein shake on occasion or high protein cereal. Suspect patient has a diet heavy in carbohydrates. Reports he eats a lot of vegetables.     Patient still finding it hard to separate liquids from meal times. Especially struggles with not drinking fluids for at least 30 minutes prior to eating, mostly due to just forgetting.     Hydration - drinks water or skim milk. 1-2x a week may have a soda or coffee.     July 2025: Struggling mentally over the last few months and also dealing with a lot of pain r/t his chronic pancreatitis. Patient works with a therapist every week and he finds this helpful during this time of stress in his life. Last RD visit, this writer provided patient with food insecurity resources, patient reports he looked at these but didn't report if he found any to be helpful. Patient very quiet and answered questions with one word answers most of the visit.      Patient is done with his HP  calls. Tasklist items remaining are letter of support from PCP needed, Sleep medicine evaluation, letter of support from therapist, and labs.         2/19/2025    10:22 PM   Recall Diet Questions Reviewed With Patient   Describe what you typically consume for breakfast (typical or most recent) Cereal   Describe what you typically consume for lunch (typical or most recent) Some kind of vegetable with light ranch.   Describe what you typically consume for supper (typical or most recent) Tonight I had an Ensure for dinner.  It kind of varies depending on how my pancreatitis is.   Describe what you typically consume as snacks (typical or most recent) Pretzels, low fat triscuits, low sugar popsicles.   How many ounces of water, or other low calorie drinks, do you drink daily (8 oz=1 glass)? 64 oz or more   How many ounces of caffeine (coffee, tea, pop) do you drink daily (8 oz=1 glass)? 8 oz   How many ounces of carbonated (pop, beer, sparkling water) drinks do you drinky daily (8 oz=1 glass)? 8 oz   How many ounces of juice, pop, sweet tea, sports drinks, protein drinks, other sweetened drinks, do you drink daily (8 oz=1 glass)? 16 oz   How many ounces of milk do you drink daily (8 oz=1 glass) 16 oz   Please indicate the type of milk 1%    skim   How often do you drink alcohol? Never           2/19/2025    10:22 PM   Eating Habits   What foods do you crave? I'd love a cheeseburger but with my current health issues that would be unwise.           2/19/2025    10:22 PM   Dining Out History Reviewed With Patient   How often do you dine out? Rarely.   Where do you dine out? (select all that apply) sit-down restaurants   What types of food do you order when you dine out? Usually I have a hard time finding something that agrees with my diet.  I order a salad or vegetables.     EXERCISE:  Gets up and moves around throughout the day if able to.         2/19/2025    10:22 PM   --   How often  nausea/vomiting. Additional symptomatic treatment recommendations discussed. Education was added to AVS. Patient was agreeable to plan and verbalized understanding.       Followup:    If not improving in 1 week or if condition worsens, follow up with your Primary Care Provider    Patient Instructions     Meclizine three times a day as needed for dizziness  Ondansetron every 6 hours as needed for nausea/vomiting  Prednisone daily for 5 days      Patient Education     Dizziness (Vertigo) and Balance Problems: Staying Safe     Replace burned-out light bulbs to keep your home safe and well lit.     Falls or accidents can lead to pain, broken bones, and fear of future falls. Protect yourself and others by preparing for episodes. Simple steps can help you stay safe at home and wherever you go.  Lighting  Keep all areas well lit. This helps your eyes send the right signals to the brain. It also makes you less likely to trip and fall. If bright lights make symptoms worse, dim the lights or lie in a dark room until the dizziness passes. Then turn the lights back to their normal level.  Tips:    Keep a flashlight by the bed.    Place nightlights in bathrooms and hallways.    Replace burned-out bulbs, or have someone replace them for you.  Preventing falls  To reduce your risk of falling:    Get out of bed or up from a chair slowly.    Wear low-heeled shoes that fit properly and have slip-resistant soles.    Remove throw rugs. Clear clutter from walkways.    Use handrails on stairs. Have handrails installed or adjusted if needed.    Install grab bars in the bathroom. Don't use towel racks for balance.    Use a shower stool. Also put adhesive strips in the shower or on the tub floor.  Going out  With a little time and preparation, you can get around safely.  Tips:    Bring a cane or walking aid if needed.    Give yourself plenty of time in case you start to get dizzy.    Ask your healthcare provider what type of exercise is  do you exercise? Less than 1 time per week   What is the duration of your exercise (in minutes)? 10 Minutes   What types of exercise do you do? walking   What keeps you from being more active? I am as active as I can possbily be    Pain     ADDITIONAL INFORMATION:  Mother is a good support for patient.     NUTRITION DIAGNOSIS:  Obesity r/t long history of positive energy balance aeb BMI >30 kg/m2.    INTERVENTION:  Intervention Provided/Education Provided on post-op diet guidelines, vitamins/minerals essential post-operatively, GI anatomy of bariatric surgeries, ways to help prepare for post-op diet guidelines pre-operatively, portion/calorie-control, mindful eating and sources of protein.  Patient demonstrates understanding.     Personal barriers to making and continuing required life changes have been identified, and strategies to overcome those barriers have been recommended AND family and social supports have been assessed and strategies to strengthen those supports have been recommended.    Provided pt with list of goals, RD contact information and resources listed below via [employer].             2/19/2025    10:22 PM   Questions Reviewed With Patient   How ready are you to make changes regarding your weight? Number 1 = Not ready at all to make changes up to 10 = very ready. 8   How confident are you that you can change? 1 = Not confident that you will be successful making changes up to 10 = very confident. 8     Expected Engagement: fair-good    Goals for today's visit:   1) Focus on getting at least 60 grams of protein daily.   2) Try to incorporate more fruits and vegetables where you are able to.   3) Schedule lab visit to get remaining labs completed.     GOALS:  Relating To Eating:  Eat slowly (20-30 minutes per meal), chewing foods well (25 chews per bite/applesauce consistency)  Eat 3 meals per day  Consume 60-90 g protein per day    Relating to beverages:  Reduce caffeine/carbonation/calorie containing  safe for your condition.    Be patient. If an activity such as walking through a crowded shop causes you stress, you may not be ready for it yet.  Driving  If you become dizzy or disoriented while driving, you could hurt yourself and others. That's why it's best to not drive until symptoms have gone away. In some cases, your license may be temporarily held until it's safe for you to drive again.  For safety:    Ask a friend to drive for you.    Take public transportation.    Walk to stores and other places when you can.  Asking for help  Don't be afraid to ask for help running errands, cooking meals, and doing exercise. Whether it's a friend, loved one, neighbor, or stranger on the street, a little help can make a world of difference.   Date Last Reviewed: 11/1/2016 2000-2019 [employer]. [address] [place] [address]. All rights reserved. This information is not intended as a substitute for professional medical care. Always follow your healthcare professional's instructions.           Patient Education     Labyrinthitis    The inner ear is located behind the middle ear. It is part of the balance center of your body. When the inner ear becomes irritated or inflamed it causes a condition known as labyrinthitis. It may due to a viral infection, but often a cause is not found. Labyrinthitis causes sudden dizziness and balance problems. It often causes a feeling that you or the room is spinning (vertigo). You may feel nauseated or throw up. You may also feel a loss of balance when trying to walk. Head movement from side to side or changes in body position (from lying to sitting or standing) may worsen symptoms. You may have ringing in the ear. Hearing may also be affected.  An episode of labyrinthitis may last seconds, minutes, or hours. It may never return. Or symptoms may recur off and on over several weeks or longer. In many cases, the problem is short-term and goes away when the inner  "beverages  Separate fluids from meals by 30 minutes before, during, and after eating  Drink 48-64 ounces of fluid per day    Relating to activity:  Increase activity as able    Eating Well on a Budget: https://www.[employer].com/148269.pdf    Too Good To Go:   An [employer]rina that lets users buy discounted \"Surprise Bags\" of unsold food from local stores, bakeries, and restaurants. The bags typically contain a variety of food/meals and help to reduce food waste while saving money.    [employer] [facility] for food resources   https://www.hungersolutions.org/    Follow Up: 1 month     Time spent with patient: 12 minutes.  Bre Reyes RD, LD    " ear issue resolves.  Home care    Take medicine as prescribed to relieve your symptoms. Unless another medicine was prescribed, you can try over-the-counter motion sickness pills. Note that these medicines may cause drowsiness.    If symptoms are severe, rest quietly in bed. Change positions slowly. There may be 1 position feels best, such as lying on 1 side or lying on your back with your head slightly raised on pillows. Until you have no symptoms, you are at a higher risk of falling. Let someone help you when you get up. Get rid of home hazards such as loose electrical cords and throw rugs. Don t walk in unfamiliar areas that are not lighted. Use night lights in bathrooms and kitchen areas.    Vestibular rehabilitation exercises are done by moving your head to help fix problems in the inner ear. If these exercises have been prescribed, do them as you have been instructed.    Do not drive or work with dangerous machinery until 1 week has passed without symptoms. Be careful when using stairs.    Follow-up care  Follow up with your healthcare provider or as advised by our staff.  When to seek medical advice  Call your healthcare provider for any of the following:    Symptoms that are not controlled by medicine     Symptoms that get worse    Repeated vomiting that is not relieved by medicine    Weakness that gets worse    Fainting    Headache or unusual drowsiness    Trouble with vision or speech    Trouble moving your face, arms, or legs    Hearing loss    Symptoms that last more than a few days  Date Last Reviewed: 11/1/2017 2000-2019 [employer]. [address] [facility], [place] [address]. All rights reserved. This information is not intended as a substitute for professional medical care. Always follow your healthcare professional's instructions.

## 2025-07-23 ENCOUNTER — CARE COORDINATION (OUTPATIENT)
Dept: ENDOCRINOLOGY | Facility: CLINIC | Age: 40
End: 2025-07-23

## 2025-07-23 ENCOUNTER — VIRTUAL VISIT (OUTPATIENT)
Dept: ENDOCRINOLOGY | Facility: CLINIC | Age: 40
End: 2025-07-23
Payer: COMMERCIAL

## 2025-07-23 VITALS — HEIGHT: 72 IN | BODY MASS INDEX: 37.97 KG/M2

## 2025-07-23 DIAGNOSIS — E11.22 TYPE 2 DIABETES MELLITUS WITH CHRONIC KIDNEY DISEASE, WITHOUT LONG-TERM CURRENT USE OF INSULIN, UNSPECIFIED CKD STAGE (H): ICD-10-CM

## 2025-07-23 DIAGNOSIS — Z71.3 NUTRITIONAL COUNSELING: Primary | ICD-10-CM

## 2025-07-23 DIAGNOSIS — E66.9 OBESITY: ICD-10-CM

## 2025-07-23 DIAGNOSIS — E66.01 MORBID OBESITY (H): Primary | ICD-10-CM

## 2025-07-23 NOTE — NURSING NOTE
Current patient location: [address] [place] [address] [address]  [place] [address]    Is the patient currently in the state of [place]? YES    Visit mode:VIDEO    If the visit is dropped, the patient can be reconnected by: VIDEO VISIT: Send to e-mail at: [email or web address]    Will anyone else be joining the visit? NO  (If patient encounters technical issues they should call [phone]    Are changes needed to the allergy or medication list? Pt stated no changes to allergies and Pt stated no med changes    Are refills needed on medications prescribed by this physician? NO    Reason for visit: RECHECK    Gina TOTH

## 2025-07-23 NOTE — LETTER
7/23/2025       RE: Abiel Weeks  [address]  [place] [address]     Dear Colleague,    Thank you for referring your patient, Abiel Weeks, to the [facility] [place] at [facility]. Please see a copy of my visit note below.    Video-Visit Details    Type of service:  Video Visit    Video Start Time: 10:31 AM   Video End Time: 10:43 AM     Originating Location (pt. Location): Home    Distant Location (provider location):  Offsite (providers home) [facility] [place]     Platform used for Video Visit: [employer]    Return Bariatric Nutrition Consultation Note    Reason For Visit: Nutrition Assessment    Abiel Weeks is a 40 year old presenting today for return bariatric nutrition consult.   Patient is interested in weight loss surgery with Dr. Stroud  expected surgery in D.  Patient is accompanied by self.  This is patient's 6th of 3 required nutrition visits prior to surgery. Completed Weight Loss Surgery Nutrition Class on 2/28/25.    Working towards weight loss prior to possible pancreatectomy.     Pt referred by MYKE Segura on April 2, 2025.    CO-MORBIDITIES OF OBESITY INCLUDE:        2/19/2025    10:22 PM   --   I have the following health issues associated with obesity Type II Diabetes    Heart Disease    High Blood Pressure    Sleep Apnea    GERD (Reflux)       SUPPORT:      2/19/2025    10:22 PM   Support System Reviewed With Patient   Who do you have in your support network that can be available to help you for the first 2 weeks after surgery? My mother.   Who can you count on for support throughout your weight loss surgery journey? My mother, and the rest of my family.       ANTHROPOMETRICS:  Initial consult weight: 297 lb on 2/20/25   Estimated body mass index is 37.97 kg/m  as calculated from the following:    Height as of this encounter: 1.829 m  "(6' 0.01\").    Weight as of 5/22/25: 127 kg (280 lb).  Current Weight: 289 lb per care everywhere note on 6/20/25.     Required weight loss goal pre-op: -10 lbs from initial consult weight (goal weight 287 lbs or less before surgery)         2/19/2025    10:22 PM   --   I have tried the following methods to lose weight Watching portions or calories    Physician directed program           2/19/2025    10:22 PM   Weight Loss Questions Reviewed With Patient   How long have you been overweight? Since late 20's to early 40's     MEDICATION FOR WEIGHT LOSS:   Metformin   Not a candidate for GLP1 due to hx of pancreatitis   Topiramate - 75 mg daily - eating less, thoughts around food have diminished     VITAMIN/MINERAL SUPPLEMENTS: ROHIT Narvaez    NUTRITION HISTORY:  Food allergies: None   Food intolerances: citrus - can cause GI intolerance   Previous experience with diet modification for weight loss: trying to make mindful choices.   Barriers to lifestyle change: hard for patient to exercise due to chronic pancreatitis.     Trying to portion control more. Making more mindful choices with eating. Follows a low fat/low sodium diet.     Patient avoids meat due to it being a trigger food.     Recent Diet Recall:  Breakfast: Doesn't feel well in the morning when he wakes up. Tries to force himself to have a protein shake (this is protein powder added to skim milk).   Lunch: Cereal with skim milk OR vegetables (celery, carrots, broccoli)   Dinner: Cereal with skim milk OR vegetables (celery, carrots, broccoli)   Snacks: Triscuits, pretzels   Beverages: mostly water, once a week will have a diet soda  Alcohol: avoids alcohol completely     May 2025: Difficult to get a true understanding of what eating looks like for patient. Patient reports recently his diet is a protein shake for breakfast, big salad for lunch - caesar salad with no protein, snack - high protein Cheerios    Very limited food selections available to patient due " to his chronic pancreatitis. Patient reports his body can't tolerate a lot of foods due to his chronic pancreatitis. All meats give him problems - severe cramping, abdominal pain. Fruits and vegetables are ok for the most part. Starchy beans are ok. Difficult to get protein goal met so therefore he drinks protein shakes and eats high protein Cheerios.     Per documentation from recent neuropsych visit and transplant RD working with patient, patient reports eating a lot of carbohydrate snack like foods (ex. Triscuits, Lon Charms, Cheez Its, Hawaiian rolls, also likes SF popsicles).    June 2025: Saw Cassidy last month. Has his sleep medicine appointment scheduled for later August. Also needs letter of support from psych, cardiology, PCP and has a few remaining labs to get done.     Patient has been dealing with some like stressors. Patient's mother who he lives with recently had to quit working due to being diagnosed with breast cancer. With this change they are now faced with limited finances. This has made it challenging at times to afford some of the groceries he was previously getting. Patient lives in [place], [place] and is aware that they have 3 food shelves in the city that he is utilizing as a resource. In regards to eating, patient's diet remains limited with his chronic pancreatitis. Trying to make sure he has some protein sources that he tolerates. Will have a Pure Protein shake on occasion or high protein cereal. Suspect patient has a diet heavy in carbohydrates. Reports he eats a lot of vegetables.     Patient still finding it hard to separate liquids from meal times. Especially struggles with not drinking fluids for at least 30 minutes prior to eating, mostly due to just forgetting.     Hydration - drinks water or skim milk. 1-2x a week may have a soda or coffee.     July 2025: Struggling mentally over the last few months and also dealing with a lot of pain r/t his chronic pancreatitis. Patient works  with a therapist every week and he finds this helpful during this time of stress in his life. Last RD visit, this writer provided patient with food insecurity resources, patient reports he looked at these but didn't report if he found any to be helpful. Patient very quiet and answered questions with one word answers most of the visit.     Patient is done with his HP  calls. Tasklist items remaining are letter of support from PCP needed, Sleep medicine evaluation, letter of support from therapist, and labs.         2/19/2025    10:22 PM   Recall Diet Questions Reviewed With Patient   Describe what you typically consume for breakfast (typical or most recent) Cereal   Describe what you typically consume for lunch (typical or most recent) Some kind of vegetable with light ranch.   Describe what you typically consume for supper (typical or most recent) Tonight I had an Ensure for dinner.  It kind of varies depending on how my pancreatitis is.   Describe what you typically consume as snacks (typical or most recent) Pretzels, low fat triscuits, low sugar popsicles.   How many ounces of water, or other low calorie drinks, do you drink daily (8 oz=1 glass)? 64 oz or more   How many ounces of caffeine (coffee, tea, pop) do you drink daily (8 oz=1 glass)? 8 oz   How many ounces of carbonated (pop, beer, sparkling water) drinks do you drinky daily (8 oz=1 glass)? 8 oz   How many ounces of juice, pop, sweet tea, sports drinks, protein drinks, other sweetened drinks, do you drink daily (8 oz=1 glass)? 16 oz   How many ounces of milk do you drink daily (8 oz=1 glass) 16 oz   Please indicate the type of milk 1%    skim   How often do you drink alcohol? Never           2/19/2025    10:22 PM   Eating Habits   What foods do you crave? I'd love a cheeseburger but with my current health issues that would be unwise.           2/19/2025    10:22 PM   Dining Out History Reviewed With Patient   How often do you dine out? Rarely.    Where do you dine out? (select all that apply) sit-down restaurants   What types of food do you order when you dine out? Usually I have a hard time finding something that agrees with my diet.  I order a salad or vegetables.     EXERCISE:  Gets up and moves around throughout the day if able to.         2/19/2025    10:22 PM   --   How often do you exercise? Less than 1 time per week   What is the duration of your exercise (in minutes)? 10 Minutes   What types of exercise do you do? walking   What keeps you from being more active? I am as active as I can possbily be    Pain     ADDITIONAL INFORMATION:  Mother is a good support for patient.     NUTRITION DIAGNOSIS:  Obesity r/t long history of positive energy balance aeb BMI >30 kg/m2.    INTERVENTION:  Intervention Provided/Education Provided on post-op diet guidelines, vitamins/minerals essential post-operatively, GI anatomy of bariatric surgeries, ways to help prepare for post-op diet guidelines pre-operatively, portion/calorie-control, mindful eating and sources of protein.  Patient demonstrates understanding.     Personal barriers to making and continuing required life changes have been identified, and strategies to overcome those barriers have been recommended AND family and social supports have been assessed and strategies to strengthen those supports have been recommended.    Provided pt with list of goals, RD contact information and resources listed below via [employer].             2/19/2025    10:22 PM   Questions Reviewed With Patient   How ready are you to make changes regarding your weight? Number 1 = Not ready at all to make changes up to 10 = very ready. 8   How confident are you that you can change? 1 = Not confident that you will be successful making changes up to 10 = very confident. 8     Expected Engagement: fair-good    Goals for today's visit:   1) Focus on getting at least 60 grams of protein daily.   2) Try to incorporate more fruits and  "vegetables where you are able to.   3) Schedule lab visit to get remaining labs completed.     GOALS:  Relating To Eating:  Eat slowly (20-30 minutes per meal), chewing foods well (25 chews per bite/applesauce consistency)  Eat 3 meals per day  Consume 60-90 g protein per day    Relating to beverages:  Reduce caffeine/carbonation/calorie containing beverages  Separate fluids from meals by 30 minutes before, during, and after eating  Drink 48-64 ounces of fluid per day    Relating to activity:  Increase activity as able    Eating Well on a Budget: https://www.[employer]/661091.pdf    Too Good To Go:   An sabrina that lets users buy discounted \"Surprise Bags\" of unsold food from local stores, bakeries, and restaurants. The bags typically contain a variety of food/meals and help to reduce food waste while saving money.    [employer] for food resources   https://www.hungersolutions.org/    Follow Up: 1 month     Time spent with patient: 12 minutes.  Bre Reyes RD, LD      Again, thank you for allowing me to participate in the care of your patient.      Sincerely,    Bre Reyes RD    "

## 2025-07-23 NOTE — PATIENT INSTRUCTIONS
"Alejandro Beebe,     Follow-up with RD in 1 month.     Thank you,    Bre Reyes, YISSEL, LD  If you would like to schedule or reschedule an appointment with the RD, please call [phone]    Nutrition Goals  1) Focus on getting at least 60 grams of protein daily.   2) Try to incorporate more fruits and vegetables where you are able to.   3) Schedule lab visit to get remaining labs completed.     GOALS:  Relating To Eating:  Eat slowly (20-30 minutes per meal), chewing foods well (25 chews per bite/applesauce consistency)  Eat 3 meals per day  Consume 60-90 g protein per day    Relating to beverages:  Reduce caffeine/carbonation/calorie containing beverages  Separate fluids from meals by 30 minutes before, during, and after eating  Drink 48-64 ounces of fluid per day    Relating to activity:  Increase activity as able    Eating Well on a Budget: https://www.[employer]/296143.pdf    Too Good To Go:   An sabrina that lets users buy discounted \"Surprise Bags\" of unsold food from local stores, bakeries, and restaurants. The bags typically contain a variety of food/meals and help to reduce food waste while saving money.    [employer] for food resources   https://www.hungersoStackIQions.org/    COMPREHENSIVE WEIGHT MANAGEMENT PROGRAM  VIRTUAL SUPPORT GROUPS    At [facility], our Comprehensive Weight Management program offers on-line support groups for patients who are working on weight loss and considering, preparing for, or have had weight loss surgery.     There is no cost for this opportunity.  You are invited to attend the?Virtual Support Groups?provided by any of the following locations:    [facility] via Live Life 360 Teams with Roxanne Lanza RD, RN  2.   [place] via Live Life 360 Teams with Otis Valles, PhD, LP  3.   [place] via Live Life 360 Teams with Rosa Isela Wright, LILIBETH  4.   [facility] via a Zoom Meeting with DEVON Fernandes-SUGEY    The following Support Group information can also be found on our " website:  [email or web address]      [facility]   WEIGHT LOSS SURGERY SUPPORT GROUP  The support group is a patient-lead forum that meets monthly to share experiences, encouragement and education. It is open to those who have had weight loss surgery, are scheduled for surgery, or are considering surgery.   WHEN: 3rd Wednesday of each month from 5:00PM - 6:00PM using Microsoft Teams.   FACILITATOR: Led by Roxanne Palomo RD, LD, RN, the program's Clinical Coordinator.   TO REGISTER: Please contact the clinic via [employer] or call the nurse line directly at [phone] to inform our staff that you would like an invite sent to you and to let us know the email you would like the invite sent to. Prior to the meeting, a link with directions on how to join the meeting will be sent to you.    2025 Meetings, 3rd Wednesday, 5:00pm - 6:00pm    January 15: Let's Talk  February19: Let's Talk  March 19: Speaker: Santos Chandler RD, LD  April 16: Let's Talk  May 21: Speaker: Aide Angulo RD, LD  June18: Let's Talk  July 16: Let's Talk  August 20: Let's Talk  September 17: No meeting.  October 15: Speaker: Ninoska Acuña PsyD, LP  November 19: Let's Talk  December 17: Let's Talk    [facility] BARIATRIC CARE SUPPORT GROUP  This is open to all pre- and post- operative bariatric surgery patients as well as their support system.   WHEN: 3rd Tuesday of each month from 6:30 PM - 8:00 PM using Microsoft Teams.   FACILITATOR: Led by Otis Valles, Ph.D who is a Licensed Psychologist with the [facility] Comprehensive Weight Management Program.   TO REGISTER: Please send an email to Otis Valles, Ph.D., LP at?[email or web address]?if you would like an invitation to the group. Prior to the meeting, a link with directions on how to join the meeting will be sent to you.    2025 Meetings, 3rd  Tuesday January 21st: Open Forum  February 18th: Medications and Bariatric Surgery  Speaker: Laly Maldonado, [facility] Resident  March 18th: Open Forum  April 15th: Genetics of Obesity as well as Q&A, Speaker: Katelyn Bingham MD, [facility] Comprehensive Weight Management Program.  May 20th: Open Forum  June 17th: Nutritional Labeling, Speaker: ELE  July 15th: Open Forum  August 19th: Open Forum  September 16th: Open Forum  October 21st: Open Forum  November 18th: Holiday Eating, Speaker: TBTIM  December 16th: Open Forum    [facility] POST-OPERATIVE BARIATRIC SURGERY SUPPORT GROUP  This is a support group for [facility] bariatric patients (and those external to [facility]) who have had bariatric surgery and are at least 3 months post-surgery.  WHEN: 4th Thursday of the month from 11:00 AM - 12:00 PM using Microsoft Teams.   FACILITATOR: Led by Certified Bariatric Nurse, Rosa Isela Wright RN, CBN.   TO REGISTER: Please send an email to Rosa Isela at [email or web address] if you would like an invitation to the group.  Prior to the meeting, a link with directions on how to join the meeting will be sent to you.    2025 Meetings, 4th Thursday, 11:00am - 12:pm  January 23  February 27  March 27  April 24  May 22  Majo 26  July 24  August 28  September 25  October 23  November 27: Thanksgiving Day, No meeting.      December 25: Sheldon Day, No meeting.        [facility]   HEALTHY LIFESTYLE COACHING GROUP  This is a 60 minute virtual coaching group for those who want to lead a healthier lifestyle. Come together to set goals and overcome barriers in a supportive group environment. We will address the four pillars of health: nutrition, exercise, sleep, and emotional well-being.   WHEN: 1st Friday of the month, 12:30 PM - 1:30 PM   using a Zoom meeting.     FACILITATOR: Led by National Board-Certified  Health and , Rosa Isela Pastor, [facility].  TO REGISTER: Please call the [employer] at [phone] to register. You will get an appointment to attend in [place]. Fifteen minutes prior to the meeting, complete the e-check in and you will get the link to join the meeting.  There is no charge to attend this group and space is limited.  Please register for each month you wish to attend    2025 Meetings, 1st Friday, 12:30pm-1:30pm  January 3  February 7  March 7: No meeting.  April 4  May 2  Majo 6  July 4: Fourth of July Holiday, No meeting.    August 1  September 5  October 3  November 7  December 5

## 2025-07-23 NOTE — PROGRESS NOTES
"Goal Outcome Evaluation:       Patient vital signs are at baseline: Yes  Patient able to ambulate as they were prior to admission or with assist devices provided by therapies during their stay:  Yes, few steps to bathroom.  Patient MUST void prior to discharge:  Yes  Patient able to tolerate oral intake:  Yes  Pain has adequate pain control using Oral analgesics:  Yes  Does patient have an identified :  Yes  Has goal D/C date and time been discussed with patient:  Yes     Patient is alert and oriented X4. Non -speaking English. On regular diet with good appetite. Takes pills crushed with applesauce.Up with assist of 2 with gait belt and walker. WBAT on R.leg. IV LR infusing 125ml/hr. Pain managed well with prn IV dilaudid, robaxin and scheduled acetaminophen. The writer encourage patient to be out bed, patient ambulating 30 feets and  stated \"am tired\" and reports dizziness. Patient denies dizziness after laying in bed. CMS intact. Dressing dry and intact. Continent of B&B, Uses bedpan to voided, pure wick at bedtime. Patient make all her needs known throughout the shift.ESBL precaution maintained. Continue to monitor.                 " Tasklist updated and sent to patient via [employer].    Bariatric Task List    Fax:   Please fax all paperwork to: [phone]      Status:   Is patient a candidate for bariatric surgery?:  patient is a candidate for bariatric surgery    Cleared to schedule surgeon consult?:  cleared to schedule surgeon consult 4/3/25 Dr Luanne guys  Status:  surgery evaluation in process    Surgeon: Luanne Almeida surgery month/year: TBD      Insurance:  Insurance:  HP    Contact insurance to discuss coverage:      Cigna: PCP Recommendation and Medical Clearance:       HP Referral:  Needed Need 5 coaching calls with [employer]. [facility]    Patient Info:  Initial Weight:  297    Date of Initial Weight/Height:  2/20/2025    Goal Weight (lbs):  287    Required Weight Loss:  10    Surgery Type:  sleeve gastrectomy      Dietician Visits:  Structured weight loss required by insurance?:  no structured weight loss required    Dietician Visit 1:  Completed Class on 2/28/25 -   Dietician Visit 2:  Completed Ayla sauceda RD on 3/3/25 -   Dietician Visit 3:  Completed 4/2/25 -   Dietician Visit 4:  Completed 5/13/25 -   Dietician Visit 5:  Completed 6/17/25-   Dietician Visit 6:  Completed 7/23/25 done. [facility]  Dietician Visit additional:  Needed    Clearance from dietician to see surgeon?:       Dietician Notes:           Psychological Evaluation:  Psych eval:  Completed Letter sent to pt 4/3/25 - AS, Cleared on 4/22/25 -   Therapist letter of support:  Needed      Lab Work:  Complete Blood Count:  Completed Done 2/6/25. bks  Comprehensive Metabolic Panel:  Completed Done 2/6/25. bks  Vitamin D:  Completed 2/6/25 bks  PTH:  Needed    Hgb A1c:  Completed Done 2/6/25. bks  Lipids: Needed    TSH ([facility], [facility], [place] MA): Needed    Vitamin A: Completed Done 2/6/25. bks  Vitamin B12: Needed    Nicotine Testing:  Completed 2/6/25 Negative. bks     Consults/ Clearance  Sleep Medicine:  Needed Letter sent to pt 4/3/25 - AS;  "8/28/25 Megha perez    Testing:   Sleep Study:    If ordered by the sleep medicine provider. bks    PCP:  Establish care with PCP:  Completed    Follow up with PCP:       PCP letter of support:  Needed Letter sent to pt 4/3/25 - AS    Patient Education:   Information Session:  Needed View at [email or web address]  Attended New Consult Class?: Completed 2/25/25 - AS  Given \"Making your decision\" handout?:  Yes    Given \"A Roadmap to you Weight Loss Surgery\" handout?: Yes    Given \"Epic Care Companion\" information?: Yes    Attended support group?:  Needed    Support plan in place?:  Completed      Final Tasks:   Before surgery online preop class:  Needed     Nurse visit for information:  Needed    Weight Check: Needed    History and Physical: Pre-Assessment Clinic (PAC)   Needed    Final labs per clinic: Needed    See MTM Pharmacist for medication review and plan for after surgery (if DM, transplant hx, greater than 10 meds): Needed    Schedule postop appointments: Needed            Notes:  Please register for the Weight Loss Surgery Care Companion Pathway through the [employer] mobile sabrina or via web browser. You register by answering \"yes\" to the following question, \"Do you agree to take part in this free, online program?\"  Information from this Pathway can help you be more successful before surgery and for up to one year after surgery.  This Pathway through [employer] can also answer questions you may have about your surgery.   The Pathway will start when you get your Tasklist after your initial consultation or when your surgery is scheduled.               "

## 2025-07-23 NOTE — LETTER
2025   Re: Abiel Weeks   Address: [address] [place] [address] [address]  [place] [address]   : 1985       Dear Therapist,     Abiel has been diagnosed with morbid obesity and is considering undergoing bariatric surgery. A psychological evaluation is being done to see if this patient is cleared from a psychological perspective to undergo the elective surgery. However, we need your assistance with a letter of support as outlined below. Your input is valuable and will affect our decision to hold or proceed with bariatric surgery.     This letter of support should outline:     Summary of treatment to date.   Treatment goals for before and after surgery that indicate mental health support and continuation of care.   Any concerns regarding this patient's ability to follow through with treatment recommendations.   If known, documentation of cessation of illicit drug use (our policy requires patients to be drug free of illicit drugs for at least one year prior to surgery).   A statement that indicates if you support or do not support this patient for weight loss surgery.     If you have any questions, feel free to contact us via our [facility] at [phone]. Please fax the evaluation to the number below.     Sincerely,     Comprehensive Weight Management Team     [facility]   [facility]   [address], [address], [place], [place], [address]     Ph: [phone]   Fax: [phone], Dr Stroud

## 2025-07-23 NOTE — LETTER
2025   Re: Abiel Weeks   Address: [address]  [place] [address]   : 1985       Dear Sleep Medicine Provider,     Thank you for taking the time to see Abiel for a pre-operative clearance evaluation for bariatric surgery. This patient is referred for clearance because they have known KAYLA, have symptoms of KAYLA or have a BMI > 50 per our program protocol.     Please assist us with the following during your evaluation of our mutual patient:     Initial patient evaluation   Arrange and expedite necessary testing.   Guide and facilitate follow-up treatment.   Provide a letter stating that the patient is cleared to proceed with bariatric surgery from a sleep medicine standpoint.   Indicate what treatment is needed pre-surgery (if any), during inpatient hospitalization and after surgery.     The evaluation must confirm that the patient is stable from a sleep medicine standpoint to proceed with the surgery. If you have any questions, feel free to contact us via our [facility] at [phone]. Please fax the evaluation to the number below.     Sincerely,     Comprehensive Weight Management Team     [facility]   [id number] [facility], [address], [place], [place], [address]     Ph: [phone]   Fax: [phone], Dr Stroud

## 2025-07-23 NOTE — LETTER
"    2025   Re: Abiel Weeks   Address: [facility] [address]  [place] [address]   : 1985       Dear Brandon Garcia MD,     Thank you for coordinating with us to evaluate Abiel for possible bariatric surgery.     It is important to us that the primary care provider is aware of their patients' desire to have weight loss surgery and is supportive of the patient having surgery. If the patient meets criteria and clearances for surgery, we will submit to the insurance company for insurance approval and coordinate the needed appointments with our department. If you have any questions, feel free to contact us via our [facility] at [phone]. The letter of support can be faxed to the number below or routed via [facility] to our team.       Sample letter:     \"Dear [facility],     I am the primary care provider for (patient name) and I support (him/her/they) having weight loss surgery.     Sincerely,     (provider name)\"     Sincerely,     Comprehensive Weight Management Team     [facility]   [facility]   [address], [address], [place], [address]     Ph: [phone]   Fax: [phone], Dr Manas Stroud       "

## 2025-07-31 ENCOUNTER — VIRTUAL VISIT (OUTPATIENT)
Dept: SLEEP MEDICINE | Facility: CLINIC | Age: 40
End: 2025-07-31
Attending: PHYSICIAN ASSISTANT
Payer: COMMERCIAL

## 2025-07-31 VITALS — BODY MASS INDEX: 39.28 KG/M2 | HEIGHT: 72 IN | WEIGHT: 290 LBS

## 2025-07-31 DIAGNOSIS — E66.01 MORBID OBESITY (H): ICD-10-CM

## 2025-07-31 DIAGNOSIS — G47.33 OSA (OBSTRUCTIVE SLEEP APNEA): Primary | ICD-10-CM

## 2025-07-31 DIAGNOSIS — F41.1 GAD (GENERALIZED ANXIETY DISORDER): ICD-10-CM

## 2025-07-31 DIAGNOSIS — G89.4 CHRONIC PAIN DISORDER: ICD-10-CM

## 2025-07-31 DIAGNOSIS — E11.22 TYPE 2 DIABETES MELLITUS WITH CHRONIC KIDNEY DISEASE, WITHOUT LONG-TERM CURRENT USE OF INSULIN, UNSPECIFIED CKD STAGE (H): ICD-10-CM

## 2025-07-31 DIAGNOSIS — I42.8 NONISCHEMIC CARDIOMYOPATHY (H): ICD-10-CM

## 2025-07-31 DIAGNOSIS — K86.1 CHRONIC RECURRENT PANCREATITIS (H): ICD-10-CM

## 2025-07-31 DIAGNOSIS — I10 PRIMARY HYPERTENSION: ICD-10-CM

## 2025-07-31 PROCEDURE — 1125F AMNT PAIN NOTED PAIN PRSNT: CPT | Mod: 95 | Performed by: NURSE PRACTITIONER

## 2025-07-31 PROCEDURE — 98002 SYNCH AUDIO-VIDEO NEW MOD 45: CPT | Performed by: NURSE PRACTITIONER

## 2025-07-31 ASSESSMENT — SLEEP AND FATIGUE QUESTIONNAIRES
HOW LIKELY ARE YOU TO NOD OFF OR FALL ASLEEP WHILE SITTING AND READING: SLIGHT CHANCE OF DOZING
HOW LIKELY ARE YOU TO NOD OFF OR FALL ASLEEP WHILE SITTING INACTIVE IN A PUBLIC PLACE: WOULD NEVER DOZE
HOW LIKELY ARE YOU TO NOD OFF OR FALL ASLEEP WHILE LYING DOWN TO REST IN THE AFTERNOON WHEN CIRCUMSTANCES PERMIT: MODERATE CHANCE OF DOZING
HOW LIKELY ARE YOU TO NOD OFF OR FALL ASLEEP WHEN YOU ARE A PASSENGER IN A CAR FOR AN HOUR WITHOUT A BREAK: SLIGHT CHANCE OF DOZING
HOW LIKELY ARE YOU TO NOD OFF OR FALL ASLEEP IN A CAR, WHILE STOPPED FOR A FEW MINUTES IN TRAFFIC: WOULD NEVER DOZE
HOW LIKELY ARE YOU TO NOD OFF OR FALL ASLEEP WHILE WATCHING TV: MODERATE CHANCE OF DOZING
HOW LIKELY ARE YOU TO NOD OFF OR FALL ASLEEP WHILE SITTING AND TALKING TO SOMEONE: WOULD NEVER DOZE
HOW LIKELY ARE YOU TO NOD OFF OR FALL ASLEEP WHILE SITTING QUIETLY AFTER LUNCH WITHOUT ALCOHOL: SLIGHT CHANCE OF DOZING

## 2025-07-31 ASSESSMENT — PAIN SCALES - GENERAL: PAINLEVEL_OUTOF10: MILD PAIN (3)

## 2025-07-31 NOTE — PROGRESS NOTES
"Virtual Visit Details    Type of service:  Video Visit   Video Start Time: {video visit start/end time for provider to select:384516}  Video End Time:{video visit start/end time for provider to select:127885}    Originating Location (pt. Location): {video visit patient location:970732::\"Home\"}  {PROVIDER LOCATION On-site should be selected for visits conducted from your clinic location or adjoining [facility] hospital, academic office, or other nearby [facility] building. Off-site should be selected for all other provider locations, including home:297071}  Distant Location (provider location):  {virtual location provider:039211}  Platform used for Video Visit: {Virtual Visit Platforms:015040::\"Liqueo\"}    Outpatient Sleep Medicine Consultation:      Name: Abiel Weeks MRN# [id number]   Age: 40 year old YOB: 1985     Date of Consultation: July 31, 2025  Consultation is requested by: Cassidy Cruz PA-C  [address]  [place] [address] Cassidy Cruz  Primary care provider: Brandon Garcia       Reason for Sleep Consult:     Abiel Weeks is sent by Cassidy Cruz for a sleep consultation regarding ***.    Patient s Reason for visit  Abiel Weeks main reason for visit: (Patient-Rptd) To be honest I was told I needed to do another one for an upcoming surgery.  I'm already pretty aware that I have insomnia and sleep apnea.  Patient states problem(s) started: (Patient-Rptd) I've snored very loudly most of my life so probably when I was a teenager.  The insomnia is probably related to my PTSD and anxiety issues, but I don't actually know much about insomnia to be honest..  Abiel Weeks's goals for this visit: (Patient-Rptd) To do whatever I need to do to get approved for my surgeries.           Assessment and Plan:     Impression/Plan:    ICD-10-CM    1. KAYLA (obstructive sleep apnea)  G47.33 Adult Sleep Evaluation & Management  Referral "     Comprehensive Sleep Study      2. Primary hypertension  I10 Comprehensive Sleep Study      3. Nonischemic cardiomyopathy (H)  I42.8 Comprehensive Sleep Study      4. Chronic pain disorder  G89.4 Comprehensive Sleep Study      5. Type 2 diabetes mellitus with chronic kidney disease, without long-term current use of insulin, unspecified CKD stage (H)  E11.22 Comprehensive Sleep Study      6. Chronic recurrent pancreatitis (H)  K86.1 Comprehensive Sleep Study      7. NOAH (generalized anxiety disorder)  F41.1 Comprehensive Sleep Study        Plans for Abiel Weeks; includes:      {TIMES:975610} minutes spent with patient, all of which were spent face-to-face counseling, consulting, coordinating plan of care.  The longitudinal plan of care for the diagnosis(es)/condition(s) as documented were addressed during this visit. Due to the added complexity in care, I will continue to support Abiel in the subsequent management and with ongoing continuity of care.    KRISTIN Angeles CNP         History of Present Illness:     Abiel Weeks presents to the sleep medicine clinic with concerns of    Abiel Jason Weeks has a medical history which includes     Upcoming Bariatric Surgery- Sleeve Gastrectomy- No date set yet    PTSD    Social Anxiety    ADD    Quit ETOH 10 years ago    Does not drive- [facility]        BP Readings from Last 6 Encounters:   02/06/25 124/74   02/05/25 (!) 136/91   07/25/24 (!) 140/88   07/18/24 116/63   05/08/24 111/77      Wt Readings from Last 10 Encounters:   07/31/25 131.5 kg (290 lb)   05/22/25 127 kg (280 lb)   05/13/25 127 kg (280 lb)   04/03/25 127 kg (280 lb)   04/02/25 127 kg (280 lb)   03/24/25 130.6 kg (288 lb)   02/28/25 134.7 kg (297 lb)   02/20/25 134.7 kg (297 lb)   02/06/25 134.7 kg (297 lb)   02/06/25 135.2 kg (298 lb 1 oz)      Hemoglobin A1C   Date Value Ref Range Status   02/06/2025 6.2 (H) <5.7 % Final     Comment:     Normal <5.7%   Prediabetes 5.7-6.4%   "  Diabetes 6.5% or higher     Note: Adopted from ADA consensus guidelines.        Tonsils: In    Neck Circumference: 19\"    Raymond Sleepiness Scale  Total score - Raymond:7   (Less than 10 normal)     Insomnia Severity Scale  HAKEEM Total Score: 16  (normal 0-7, mild 8-14, moderate 15-21, severe 22-28)    Social History:  Disabled, Unable to work    Past Sleep Evaluations:          SLEEP-WAKE SCHEDULE:       Work/School Days: Patient goes to school/work: (Patient-Rptd) No   Usually gets into bed at (Patient-Rptd) It varies but around midnight.  Takes patient about (Patient-Rptd) Sometimes minutes, sometimes hours. to fall asleep  Has trouble falling asleep (Patient-Rptd) 4-5 nights per week  Wakes up in the middle of the night (Patient-Rptd) 1-2 times.  Wakes up due to (Patient-Rptd) Pain;External stimuli (bed partner, pets, noise, etc);Use the bathroom;Nightmares  He has trouble falling back asleep (Patient-Rptd) 1-2 times a week.   It usually takes (Patient-Rptd) Sometimes minutes, sometimes hours. to get back to sleep  Patient is usually up at (Patient-Rptd) 10:30 AM  Uses alarm: (Patient-Rptd) Yes    Weekends/Non-work Days/All Other Days:  Usually gets into bed at (Patient-Rptd) Midnight   Takes patient about (Patient-Rptd) It varies quite a bit in general. to fall asleep  Patient is usually up at (Patient-Rptd) 10:30 AM  Uses alarm: (Patient-Rptd) Yes    Sleep Need  Patient gets  (Patient-Rptd) 4-6 hours a night sleep on average   Patient thinks he needs about (Patient-Rptd) 6-8 hours a night sleep    Abiel Sanduhs prefers to sleep in this position(s): (Patient-Rptd) Side;Recliner   Patient states they do the following activities in bed: (Patient-Rptd) Read;Watch TV;Use phone, computer, or tablet    Naps  Patient takes a purposeful nap (Patient-Rptd) Pretty much everyday, being in almost constant pain is tiring. times a week and naps are usually (Patient-Rptd) Roughly 45 mins to 2 hours depending on the " day.  I don't usually set a timer. in duration  He feels better after a nap: (Patient-Rptd) Yes  He dozes off unintentionally (Patient-Rptd) 3 days per week  Patient has had a driving accident or near-miss due to sleepiness/drowsiness: (Patient-Rptd) No      SLEEP DISRUPTIONS:    Breathing/Snoring  Patient snores:(Patient-Rptd) Yes  Other people complain about his snoring: (Patient-Rptd) Yes  Patient has been told he stops breathing in his sleep:(Patient-Rptd) Yes  He has issues with the following: (Patient-Rptd) Morning mouth dryness.    Movement:  Patient gets pain, discomfort, with an urge to move:  (Patient-Rptd) Yes restless legs symptoms  It happens when he is resting:  (Patient-Rptd) No  It happens more at night:  (Patient-Rptd) No  Patient has been told he kicks his legs at night:  (Patient-Rptd) Yes     Behaviors in Sleep:  Abiel Weeks has experienced the following behaviors while sleeping: (Patient-Rptd) Recurring Nightmares;Sleep-talking;Sleepwalking;Teeth grinding;Kicking or punching  He has experienced sudden muscle weakness during the day: (Patient-Rptd) No  Pt denies bruxism, sleep talking, sleep walking, and dream enactment behavior. Pt denies sleep paralysis, hypnagogue and cataplexy.       Is there anything else you would like your sleep provider to know: (Patient-Rptd) Not that I can think of.      CAFFEINE AND OTHER SUBSTANCES:    Patient consumes caffeinated beverages per day:  (Patient-Rptd) On average zero.  Last caffeine use is usually: (Patient-Rptd) I try not to after 5 PM  List of any prescribed or over the counter stimulants that patient takes: (Patient-Rptd) None that I'm aware of.  List of any prescribed or over the counter sleep medication patient takes: (Patient-Rptd) I was prescribed amitryptiline to help me with my anxiety and PTSD and to help me sleep at night.  Other than that I'm not taking anything.  List of previous sleep medications that patient has tried:  (Patient-Rptd) Zolpidem (It makes me sleepwalk.)  Patient drinks alcohol to help them sleep: (Patient-Rptd) No  Patient drinks alcohol near bedtime: (Patient-Rptd) No    Family History:  Patient has a family member been diagnosed with a sleep disorder: (Patient-Rptd) Yes  (Patient-Rptd) My father also has sleep apnea, I think my mother does as well.         SCALES:    EPWORTH SLEEPINESS SCALE         7/31/2025    10:34 AM    Udall Sleepiness Scale ( JEFFREY Shelton  [id number]<br>ESS - [place]/English - Final version - 21 Nov 07 - [facility] [place].)   Sitting and reading Slight chance of dozing   Watching TV Moderate chance of dozing   Sitting, inactive in a public place (e.g. a theatre or a meeting) Would never doze   As a passenger in a car for an hour without a break Slight chance of dozing   Lying down to rest in the afternoon when circumstances permit Moderate chance of dozing   Sitting and talking to someone Would never doze   Sitting quietly after a lunch without alcohol Slight chance of dozing   In a car, while stopped for a few minutes in traffic Would never doze   Udall Score (MC) 7   Udall Score (Sleep) 7        Patient-reported         INSOMNIA SEVERITY INDEX (HAKEEM)          7/31/2025    10:11 AM   Insomnia Severity Index (HAKEEM)   Difficulty falling asleep 2   Difficulty staying asleep 2   Problems waking up too early 2   How SATISFIED/DISSATISFIED are you with your CURRENT sleep pattern? 3   How NOTICEABLE to others do you think your sleep problem is in terms of impairing the quality of your life? 3   How WORRIED/DISTRESSED are you about your current sleep problem? 2   To what extent do you consider your sleep problem to INTERFERE with your daily functioning (e.g. daytime fatigue, mood, ability to function at work/daily chores, concentration, memory, mood, etc.) CURRENTLY? 2   HAKEEM Total Score 16        Patient-reported       Guidelines for Scoring/Interpretation:  Total score categories:  0-7 = No  clinically significant insomnia   8-14 = Subthreshold insomnia   15-21 = Clinical insomnia (moderate severity)  22-28 = Clinical insomnia (severe)  Used via courtesy of www.myhealth.va.gov with permission from Roc Bronson PhD., [facility]      STOP BANG           7/31/2025    10:34 AM   STOP BANG Questionnaire (  2008, the American Society of Anesthesiologists, Inc. [employer]   1. Snoring - Do you snore loudly (louder than talking or loud enough to be heard through closed doors)? Yes   2. Tired - Do you often feel tired, fatigued, or sleepy during daytime? Yes   3. Observed - Has anyone observed you stop breathing during your sleep? Yes   4. Blood pressure - Do you have or are you being treated for high blood pressure? Yes   5. BMI - BMI more than 35 kg/m2? Yes   6. Age - Age over 50 yr old? No   7. Neck circumference - Neck circumference greater than 40 cm? Yes   8. Gender - Gender male? Yes   STOP BANG Score (MC): 7 (High risk of KAYLA)         GAD7         No data to display                  CAGE-AID        1/28/2025    10:19 AM   CAGE-AID Flowsheet   Have you ever felt you should Cut down on your drinking or drug use?    Have people Annoyed you by criticizing your drinking or drug use?    Have you ever felt bad or Guilty about your drinking or drug use?    Have you ever had a drink or used drugs first thing in the morning to steady your nerves or to get rid of a hangover? (Eye opener)    CAGE-AID SCORE    Have you ever felt you should Cut down on your drinking or drug use?    Have people Annoyed you by criticizing your drinking or drug use?    Have you ever felt bad or Guilty about your drinking or drug use?    Have you ever had a drink or used drugs first thing in the morning to steady your nerves or to get rid of a hangover? (Eye opener)    CAGE-AID SCORE        Information is confidential and restricted. Go to Review Flowsheets to unlock data.       CAGE-AID reprinted with  "permission from the [facility] Journal, MIKEY Lu. and JOSE ALFREDO Yeung, \"Conjoint screening questionnaires for alcohol and drug abuse\" [facility] Journal 94: 135-140, 1995.      PATIENT HEALTH QUESTIONNAIRE-9 (PHQ - 9)        1/28/2025     9:57 AM   PHQ-9 ([employer])   1.  Little interest or pleasure in doing things 1   2.  Feeling down, depressed, or hopeless 1   3.  Trouble falling or staying asleep, or sleeping too much 1   4.  Feeling tired or having little energy 2   5.  Poor appetite or overeating 1   6.  Feeling bad about yourself - or that you are a failure or have let yourself or your family down 1   7.  Trouble concentrating on things, such as reading the newspaper or watching television 0   8.  Moving or speaking so slowly that other people could have noticed. Or the opposite - being so fidgety or restless that you have been moving around a lot more than usual 0   9.  Thoughts that you would be better off dead, or of hurting yourself in some way 0   PHQ-9 Total Score 7    6.  Feeling bad about yourself 1   7.  Trouble concentrating 0   8.  Moving slowly or restless 0   9.  Suicidal or self-harm thoughts 0   1.  Little interest or pleasure in doing things Several days   2.  Feeling down, depressed, or hopeless Several days   3.  Trouble falling or staying asleep, or sleeping too much Several days   4.  Feeling tired or having little energy More than half the days   5.  Poor appetite or overeating Several days   6.  Feeling bad about yourself Several days   7.  Trouble concentrating Not at all   8.  Moving slowly or restless Not at all   9.  Suicidal or self-harm thoughts Not at all   PHQ-9 via [employer] TOTAL SCORE-----> 7 (Mild depression)   Difficulty at work, home, or with people Somewhat difficult       Patient-reported       Developed by Tracy Arauz, Asuncion Barnhart, Demian Edward and colleagues, with an educational crystal from [employer] No permission required to reproduce, " translate, display or distribute.        Allergies:    Allergies   Allergen Reactions    Iodine Hives, Itching and Rash     Pt allergic to iodine/ shellfish , contrast dyes.     Pt allergic to iodine/ shellfish , contrast dyes.    Trazodone Other (See Comments)     priapsim    Amoxicillin      Patient does not what type of reaction/happened as a child    Citrus Nausea    Methylcellulose     Shellfish-Derived Products Nausea and Nausea and Vomiting     Other Reaction(s): GI Upset, Unknown       Medications:    Current Outpatient Medications   Medication Sig Dispense Refill    acetaminophen (TYLENOL) 500 MG tablet Take 1,000 mg by mouth every 8 hours as needed      albuterol (PROAIR HFA/PROVENTIL HFA/VENTOLIN HFA) 108 (90 Base) MCG/ACT inhaler Inhale 2 puffs into the lungs every 4 hours as needed for shortness of breath, wheezing or cough      alum & mag hydroxide-simethicone (CONSTANTINO-LANTA) 200-200-20 MG/5ML SUSP suspension Take 15 mLs by mouth every 4 hours as needed.      amitriptyline (ELAVIL) 75 MG tablet Take 75 mg by mouth at bedtime.      amLODIPine (NORVASC) 10 MG tablet Take 1 tablet by mouth daily      carvedilol (COREG) 25 MG tablet Take 50 mg by mouth 2 times daily (with meals)      Continuous Glucose Sensor (DEXCOM G7 SENSOR) MISC Change every 10 days. 3 each 5    docusate sodium (COLACE) 100 MG capsule Take 200 mg by mouth daily.      DULoxetine (CYMBALTA) 60 MG capsule Take 60 mg by mouth daily      hydrALAZINE (APRESOLINE) 100 MG tablet Take 100 mg by mouth 3 times daily      isosorbide mononitrate (IMDUR) 30 MG 24 hr tablet Take 90 mg by mouth at bedtime      lipase-protease-amylase (CREON) 12411-47758-259480 units CPEP per EC capsule Take 2-3 with meals / 1-2 with snacks, up to 15 per day. 450 capsule 4    medical cannabis (Patient's own supply) See Admin Instructions. (The purpose of this order is to document that the patient reports taking medical cannabis.  This is not a prescription, and is not  used to certify that the patient has a qualifying medical condition.)      melatonin 3 MG CAPS Take 6 mg by mouth at bedtime.      metFORMIN (GLUCOPHAGE) 1000 MG tablet Take 1,000 mg by mouth 2 times daily (with meals)      Multiple Vitamins-Minerals (SUPER THERA TOÑO M) TABS Take 1 tablet by mouth daily      ondansetron (ZOFRAN ODT) 4 MG ODT tab Place 4 mg under the tongue every 8 hours as needed      pantoprazole (PROTONIX) 40 MG EC tablet Take 40 mg by mouth 2 times daily (with meals)      polyethylene glycol (MIRALAX) 17 g packet Take 17 g by mouth daily as needed      pregabalin (LYRICA) 150 MG capsule Take 1 capsule by mouth 3 times daily      topiramate (TOPAMAX) 25 MG tablet Take 3 tablets (75 mg) by mouth daily. 90 tablet 3       Problem List:  Patient Active Problem List    Diagnosis Date Noted    Alcohol use disorder, severe, in sustained remission (H) 01/28/2025     Priority: Medium    Nonischemic cardiomyopathy (H) 11/19/2024     Priority: Medium     EF has recovered      NOAH (generalized anxiety disorder) 03/18/2024     Priority: Medium    Moderate episode of recurrent major depressive disorder (H) 03/18/2024     Priority: Medium    Type 2 diabetes mellitus with chronic kidney disease, without long-term current use of insulin, unspecified CKD stage (H) 01/05/2024     Priority: Medium    Chronic pain disorder 07/06/2023     Priority: Medium    Elevated transaminase level 06/13/2023     Priority: Medium    Pancreatic pseudocyst 10/23/2019     Priority: Medium    KAYLA (obstructive sleep apnea) 04/17/2019     Priority: Medium     Sleep study 4/17 2019 - obstructive sleep apnea and few central/mixed apneas. CPAP recommended.      Chronic recurrent pancreatitis (H) 03/25/2019     Priority: Medium    Hyperparathyroidism 03/11/2019     Priority: Medium     Staff message from Dr. Olsen 3/14/19:   So the level are mildly elevated- I was hoping it would be normal  and we could walk away   So since the blood pressure  is doing well, it seems that pheo is less likely ......   But historically, we only found patients late with symptoms, but more recent with so much imaging we are finding people earlier with no symptoms--.....    So I agree that we need to follow  This rimma.......   Per the lab notes  On our AKN    [email or web address]     Tricyclic antidepressants and labetalol and sotalol (beta blockers) may elevate levels of metanephrines. If clinically feasible, these medications should be discontinued at least 1 week before collection.     He was getting labetalol  ...................     1  If he starts to have worsening hypertension you could do the random blood test     - Confirming positive plasma metanephrine results in patients with nonepisodic hypertension     2. If worried , he could get a Octreoscan- which looks for neuroendocrine tumors given the PTH level, and pancreatitis hx --  This is best at [facility] with the nuclear medicine dept and their scans-       May sure you follow the hyperparathyroidism- that has been reported to precipitate pancreatitis- i had a case this past year- ..he was the spouse of one of our providers !      Essential hypertension 02/24/2019     Priority: Medium        Past Medical/Surgical History:  Past Medical History:   Diagnosis Date    Chronic kidney disease     Chronic recurrent pancreatitis (H)     Depressive disorder 1994    Have had it since I was young.    Diabetes (H)     DM2 (diabetes mellitus, type 2) (H)     NOAH (generalized anxiety disorder)     Gastroesophageal reflux disease     History of diabetes mellitus 2022    Type 2    Hyperparathyroidism     Hypertension     Kidney failure 2019    Stage 3 but no dialysis.    MDD (major depressive disorder)     Nonischemic cardiomyopathy (H)     Obese     KAYLA (obstructive sleep apnea)     Other chronic pain     Other mental problems 2021     PTSD    PTSD (post-traumatic stress disorder)     Seasonal allergic rhinitis     Sleep apnea     Stomach problems 2019    Pancreatitis (Recurrent)     Past Surgical History:   Procedure Laterality Date    ABDOMEN SURGERY  2024    Lala had several all endoscopic    CHOLECYSTECTOMY      ENDOSCOPIC ULTRASOUND UPPER GASTROINTESTINAL TRACT (GI) N/A 07/18/2024    Procedure: ENDOSCOPIC ULTRASOUND, ESOPHAGOSCOPY / UPPER GASTROINTESTINAL TRACT (GI) with cystgastrostomy;  Surgeon: Murtaza Villafuerte MD;  Location: [facility] OR    ESOPHAGOSCOPY, GASTROSCOPY, DUODENOSCOPY (EGD), COMBINED N/A 07/25/2024    Procedure: ESOPHAGOGASTRODUODENOSCOPY with flouroscopy with Axios stent removal;  Surgeon: Murtaza Villafuerte MD;  Location: [facility] OR       Social History:  Social History     Socioeconomic History    Marital status: Single     Spouse name: Not on file    Number of children: Not on file    Years of education: Not on file    Highest education level: Not on file   Occupational History    Not on file   Tobacco Use    Smoking status: Former     Types: Cigarettes    Smokeless tobacco: Never    Tobacco comments:     Pt smoked for less than a year, 1-2 cigarillos a day 10-20 years ago.   Vaping Use    Vaping status: Never Used   Substance and Sexual Activity    Alcohol use: Not Currently     Comment: Last drink 10 yrs ago    Drug use: Yes     Types: Marijuana     Comment: Flower and gummies for Pain Management    Sexual activity: Not Currently     Partners: Female     Birth control/protection: Abstinence   Other Topics Concern    Not on file   Social History Narrative    Not on file     Social Drivers of Health     Financial Resource Strain: Low Risk  (1/24/2024)    [facility]    Financial Resource Strain     Difficulty of Paying Living Expenses: 3     Difficulty of Paying Living Expenses: Not on file   Food Insecurity: No Food Insecurity (1/24/2024)    Received from [facility]    Food Insecurity     Worried About Running Out of Food in the Last Year: 1   Transportation Needs: No Transportation Needs (1/24/2024)    Received from [facility]    Transportation Needs     Lack of Transportation (Medical): 1   Physical Activity: Not on file   Stress: Not on file   Social Connections: Socially Isolated (1/24/2024)    Received from [facility]    Social Connections     Frequency of Communication with Friends and Family: 4   Interpersonal Safety: Not on file   Housing Stability: Low Risk  (1/24/2024)    Received from [facility]    Housing Stability     Unable to Pay for Housing in the Last Year: 1       Family History:  Family History   Problem Relation Age of Onset    Diabetes Mother     Breast Cancer Mother     Substance Abuse Father     Substance Abuse Paternal Grandfather     Crohn's Disease Brother     Depression Brother     Depression Brother     Schizophrenia Paternal Uncle        Review of Systems:  A complete review of systems reviewed by me is negative with the exeption of what has been mentioned in the history of present illness.        Physical Examination:  Vitals: There were no vitals taken for this visit.  BMI= There is no height or weight on file to calculate BMI.         ***      Mallampati Class: {SEGUN NUMERAL I-IV:807235}.  Tonsillar Stage: {NUMBERS 1-4:758473}.    All Labs Personally Reviewed             Data: All pertinent previous laboratory data reviewed     Recent Labs   Lab Test 02/06/25  1107 02/06/25  1006 02/06/25  0825 08/28/24  1614 07/25/24  1306 07/18/24  1309   NA  --   --  138  --   --  138   POTASSIUM  --   --  3.8  --   --  3.9   CHLORIDE  --   --  101  --   --  103   CO2  --   --  24  --   --  22   ANIONGAP  --   --  13  --   --  13   * 184* 125*  --    < > 129*   BUN  --   --  17.8  --   --  18.6   CR  --   --  1.06 1.3  --  1.45*  "  PHAM  --   --  9.2  --   --  9.0    < > = values in this interval not displayed.       Recent Labs   Lab Test 02/06/25  0825   WBC 8.9   RBC 4.80   HGB 13.9   HCT 42.0   MCV 88   MCH 29.0   MCHC 33.1   RDW 13.2          Recent Labs   Lab Test 02/06/25  0825   PROTTOTAL 7.3   ALBUMIN 4.4   BILITOTAL 0.4   ALKPHOS 78   AST 20   ALT 12       No results found for: \"TSH\"    No results found for: \"UAMP\", \"UBARB\", \"BENZODIAZEUR\", \"UCANN\", \"UCOC\", \"OPIT\", \"UPCP\"    Iron Sat Index   Date/Time Value Ref Range Status   02/06/2025 08:25 AM 17 15 - 46 % Final     Ferritin   Date/Time Value Ref Range Status   02/06/2025 08:25  31 - 409 ng/mL Final       No results found for: \"PH\", \"PHARTERIAL\", \"PO2\", \"ZV2YUMVLMIT\", \"SAT\", \"PCO2\", \"HCO3\", \"BASEEXCESS\", \"CAMILLE\", \"BEB\"    @LABRCNTIPR(phv:4,pco2v:4,po2v:4,hco3v:4,travon:4,o2per:4)@    Echocardiology:         Chest x-ray: No results found for this or any previous visit from the past 365 days.      Chest CT: No results found for this or any previous visit from the past 365 days.      PFT: Most Recent Breeze Pulmonary Function Testing        Megha Olsen, KRISTIN CNP 7/31/2025   Sleep Medicine          " "reviewed     Recent Labs   Lab Test 02/06/25  1107 02/06/25  1006 02/06/25  0825 08/28/24  1614 07/25/24  1306 07/18/24  1309   NA  --   --  138  --   --  138   POTASSIUM  --   --  3.8  --   --  3.9   CHLORIDE  --   --  101  --   --  103   CO2  --   --  24  --   --  22   ANIONGAP  --   --  13  --   --  13   * 184* 125*  --    < > 129*   BUN  --   --  17.8  --   --  18.6   CR  --   --  1.06 1.3  --  1.45*   PHAM  --   --  9.2  --   --  9.0    < > = values in this interval not displayed.       Recent Labs   Lab Test 02/06/25  0825   WBC 8.9   RBC 4.80   HGB 13.9   HCT 42.0   MCV 88   MCH 29.0   MCHC 33.1   RDW 13.2          Recent Labs   Lab Test 02/06/25  0825   PROTTOTAL 7.3   ALBUMIN 4.4   BILITOTAL 0.4   ALKPHOS 78   AST 20   ALT 12       No results found for: \"TSH\"    No results found for: \"UAMP\", \"UBARB\", \"BENZODIAZEUR\", \"UCANN\", \"UCOC\", \"OPIT\", \"UPCP\"    Iron Sat Index   Date/Time Value Ref Range Status   02/06/2025 08:25 AM 17 15 - 46 % Final     Ferritin   Date/Time Value Ref Range Status   02/06/2025 08:25  31 - 409 ng/mL Final       No results found for: \"PH\", \"PHARTERIAL\", \"PO2\", \"[id number]\", \"SAT\", \"PCO2\", \"HCO3\", \"BASEEXCESS\", \"CAMILLE\", \"BEB\"    @LABRCNTIPR(phv:4,pco2v:4,po2v:4,hco3v:4,travon:4,o2per:4)@    Echocardiology:         Chest x-ray: No results found for this or any previous visit from the past 365 days.      Chest CT: No results found for this or any previous visit from the past 365 days.      PFT: Most Recent Breeze Pulmonary Function Testing        KRISTIN Angeles CNP 7/31/2025   Sleep Medicine          "

## 2025-07-31 NOTE — NURSING NOTE
Current patient location: [address] [place] [address] [address]  [place] [address]    Is the patient currently in the state of [place]? YES    Visit mode: VIDEO    If the visit is dropped, the patient can be reconnected by:VIDEO VISIT: Text to cell phone:   Telephone Information:   Mobile [phone]       Will anyone else be joining the visit? NO  (If patient encounters technical issues they should call [phone]    Are changes needed to the allergy or medication list? No    Are refills needed on medications prescribed by this physician? NO    Rooming Documentation:  Questionnaire(s) completed    Reason for visit: Consult    Nayeli TOTH

## 2025-07-31 NOTE — PATIENT INSTRUCTIONS
"          MY TREATMENT INFORMATION FOR SLEEP APNEA-  Abiel Weeks    DOCTOR : KRISTIN Angeles CNP    Am I having a sleep study at a sleep center?  --->Due to normal delays, you will be contacted within 2-4 weeks to schedule    Am I having a home sleep study?  --->Watch the video for the device you are using:    -/drop off device-   https://www.Healthy Humans.com/watch?v=yGGFBdELGhk    -Disposable device sent out require phone/computer application-   https://www.Healthy Humans.com/watch?v=BCce_vbiwxE      Frequently asked questions:  1. What is Obstructive Sleep Apnea (KAYLA)? KAYLA is the most common type of sleep apnea. Apnea means, \"without breath.\"  Apnea is most often caused by narrowing or collapse of the upper airway as muscles relax during sleep.   Almost everyone has occasional apneas. Most people with sleep apnea have had brief interruptions at night frequently for many years.  The severity of sleep apnea is related to how frequent and severe the events are.   2. What are the consequences of KAYLA? Symptoms include: feeling sleepy during the day, snoring loudly, gasping or stopping of breathing, trouble sleeping, and occasionally morning headaches or heartburn at night.  Sleepiness can be serious and even increase the risk of falling asleep while driving. Other health consequences may include development of high blood pressure and other cardiovascular disease in persons who are susceptible. Untreated KAYLA  can contribute to heart disease, stroke and diabetes.   3. What are the treatment options? In most situations, sleep apnea is a lifelong disease that must be managed with daily therapy. Medications are not effective for sleep apnea and surgery is generally not considered until other therapies have been tried. Your treatment is your choice . Continuous Positive Airway (CPAP) works right away and is the therapy that is effective in nearly everyone. An oral device to hold your jaw forward is usually the " next most reliable option. Other options include postioning devices (to keep you off your back), weight loss, and surgery including a tongue pacing device. There is more detail about some of these options below.  4. Are my sleep studies covered by insurance? Although we will request verification of coverage, we advise you also check in advance of the study to ensure there is coverage.    Important tips for those choosing CPAP and similar devices  REMEMBER-IF YOU RECEIVE A CALL FROM  [phone]-->IT IS TO SETUP A DEVICE  For new devices, sign up for device SABRINA to monitor your device for your followup visits  We encourage you to utilize the Zoe Majeste sabrina or website ( https://MacroCure.Back9 Network/ ) to monitor your therapy progress and share the data with your healthcare team when you discuss your sleep apnea.                                                    Know your equipment:  CPAP is continuous positive airway pressure that prevents obstructive sleep apnea by keeping the throat from collapsing while you are sleeping. In most cases, the device is  smart  and can slowly self-adjusts if your throat collapses and keeps a record every day of how well you are treated-this information is available to you and your care team.  BPAP is bilevel positive airway pressure that keeps your throat open and also assists each breath with a pressure boost to maintain adequate breathing.  Special kinds of BPAP are used in patients who have inadequate breathing from lung or heart disease. In most cases, the device is  smart  and can slowly self-adjusts to assist breathing. Like CPAP, the device keeps a record of how well you are treated.  Your mask is your connection to the device. You get to choose what feels most comfortable and the staff will help to make sure if fits. Here: are some examples of the different masks that are available: Magnetic mask aids may assist with use but there are safety issues that should be addressed  when considering with magnets* ( see end of discussion).       Key points to remember on your journey with sleep apnea:  Sleep study.  PAP devices often need to be adjusted during a sleep study to show that they are effective and adjusted right.  Good tips to remember: Try wearing just the mask during a quiet time during the day so your body adapts to wearing it. A humidifier is recommended for comfort in most cases to prevent drying of your nose and throat. Allergy medication from your provider may help you if you are having nasal congestion.  Getting settled-in. It takes more than one night for most of us to get used to wearing a mask. Try wearing just the mask during a quiet time during the day so your body adapts to wearing it. A humidifier is recommended for comfort in most cases. Our team will work with you carefully on the first day and will be in contact within 4 days and again at 2 and 4 weeks for advice and remote device adjustments. Your therapy is evaluated by the device each day.   Use it every night. The more you are able to sleep naturally for 7-8 hours, the more likely you will have good sleep and to prevent health risks or symptoms from sleep apnea. Even if you use it 4 hours it helps. Occasionally all of us are unable to use a medical therapy, in sleep apnea, it is not dangerous to miss one night.   Communicate. Call our skilled team on the number provided on the first day if your visit for problems that make it difficult to wear the device. Over 2 out of 3 patients can learn to wear the device long-term with help from our team. Remember to call our team or your sleep providers if you are unable to wear the device as we may have other solutions for those who cannot adapt to mask CPAP therapy. It is recommended that you sleep your sleep provider within the first 3 months and yearly after that if you are not having problems.   Use it for your health. We encourage use of CPAP masks during daytime  quiet periods to allow your face and brain to adapt to the sensation of CPAP so that it will be a more natural sensation to awaken to at night or during naps. This can be very useful during the first few weeks or months of adapting to CPAP though it does not help medically to wear CPAP during wakefulness and  should not be used as a strategy just to meet guidelines.  Take care of your equipment. Make sure you clean your mask and tubing using directions every day and that your filter and mask are replaced as recommended or if they are not working.     *Masks with magnets:  Updated Contraindications  Masks with magnetic components are contraindicated for use by patients where they, or anyone in close physical contact while using the mask, have the following:   Active medical implants that interact with magnets (i.e., pacemakers, implantable cardioverter defibrillators (ICD), neurostimulators, cerebrospinal fluid (CSF) shunts, insulin/infusion pumps)   Metallic implants/objects containing ferromagnetic material (i.e., aneurysm clips/flow disruption devices, embolic coils, stents, valves, electrodes, implants to restore hearing or balance with implanted magnets, ocular implants, metallic splinters in the eye)  Updated Warning  Keep the mask magnets at a safe distance of at least 6 inches (150 mm) away from implants or medical devices that may be adversely affected by magnetic interference. This warning applies to you or anyone in close physical contact with your mask. The magnets are in the frame and lower headgear clips, with a magnetic field strength of up to 400mT. When worn, they connect to secure the mask but may inadvertently detach while asleep.  Implants/medical devices, including those listed within contraindications, may be adversely affected if they change function under external magnetic fields or contain ferromagnetic materials that attract/repel to magnetic fields (some metallic implants, e.g., contact  lenses with metal, dental implants, metallic cranial plates, screws, hitesh hole covers, and bone substitute devices). Consult your physician and  of your implant / other medical device for information on the potential adverse effects of magnetic fields.    BESIDES CPAP, WHAT OTHER THERAPIES ARE THERE?    Positioning Device  Positioning devices are generally used when sleep apnea is mild and only occurs on your back.This example shows a pillow that straps around the waist. It may be appropriate for those whose sleep study shows milder sleep apnea that occurs primarily when lying flat on one's back. Preliminary studies have shown benefit but effectiveness at home may need to be verified by a home sleep test. These devices are generally not covered by medical insurance.  Examples of devices that maintain sleeping on the back to prevent snoring and mild sleep apnea.    Belt type body positioner  http://0-6.com/    Electronic reminder  http://nightshifttherapy.com/            Oral Appliance  What is oral appliance therapy?  An oral appliance device fits on your teeth at night like a retainer used after having braces. The device is made by a specialized dentist and requires several visits over 1-2 months before a manufactured device is made to fit your teeth and is adjusted to prevent your sleep apnea. Once an oral device is working properly, snoring should be improved. A home sleep test may be recommended at that time if to determine whether the sleep apnea is adequately treated.       Some things to remember:  -Oral devices are often, but not always, covered by your medical insurance. Be sure to check with your insurance provider.   -If you are referred for oral therapy, you will be given a list of specialized dentists to consider or you may choose to visit the Web site of the American Academy of Dental Sleep Medicine  -Oral devices are less likely to work if you have severe sleep apnea or are extremely  overweight.     If your doctor makes a referral for a Mandibular Advancement Device, you can call a certified sleep medicine office to verify your medical insurance will be accepted and for proper care in fabricating and adjusting the device  [facility]  Search engine: https://mms.aadsm.org/members/directory/search_bootstrap.php?org_id=ADSM&   Certified in Dental Sleep Medicine    Allen Saucedo  Degree: DDS  [address]  [address]  [place] [address]  Professional Phone: [phone]  Website: [email or web address].[email or web address]    Klever Simon  Degree: HERBERT  [facility]  [address]  [address]  [place] [address]  Professional Phone: [phone]: [phone]    [facility]  [address] [place] [address]  [address]  [place] [address]  Appointments: [phone]  Fax: [phone]    Caroline Enriquez  [facility]  [address]  [place] [address]  Professional Phone: [phone]  Website: [email or web address]      Miguel Hoover   [facility]   Degree: MD HERBERT   [address] [place] [address]  [address]  [place] [address]  Appointments: [phone]    Levy Lechuga  Degree: HERBERT  [address]  [place] [address]  Professional Phone: [phone]  Fax: [phone]    Matt Abbasi  Degree: DDS  [facility]  [address]  [address]  [place] [address]  Professional Phone: [phone]  Website: [email or web address]      Katherine Oliver  [facility]-you should verify insurance coverage  [address]  [address]  [place] [address]  Professional Phone: [phone]  Website: [email or web address]      Susan Mukherjee--DOES NOT ACCEPT INSURANCE  Degree: HERBERT--you should verify insurance coverage  [facility]  [address]  [place] [address]  Professional Phone: [phone]    Jaimee Trujillo  Degree: DDS, PhD  [facility]  [address] [place] [address]   Appointments: [phone]   Fax: [phone]     Nickolas Leon [facility]  Degree: DDS  [address] [place] [address]  Professional Phone: [phone]  Fax: [phone]  Website: [email or web address]      Cristian Lemos  Degree: DDS  [facility]  [address] [place] [address]    Mariona Mulet Pradera  Degree: MS HERBERT  [facility]  [address] [place] [address]  Professional Phone: [phone]      Stefani Bradley  Degree: MS HERBERT  [facility]  [address]  [address]  [place] [address]  Professional Phone: [phone]    Ria Liu  Degree: DDS  [facility]  [address]  [place] [address]  Appointments: [phone]    Christopher Kulkarni  Degree: LILYS  [facility]  [address] [place] [address]  Professional Phone: [phone]  Website: [email or web address]      Jonah Méndez  Degree: DDS  [facility]  [address] [place] [address]  Professional Phone: [phone]  Fax: [phone]      Marky Andres  Degree: LILYS  [facility]  [address]  [address]  [place] [address]    Angel Boggs   Degree: DDS   [facility]   [address]   [address]  [place] [address]  Phone [phone]  Website: [email or web address]    Chrissie Kim   Degree: DDS   [address]  [address]  [place] [address]  Appointments: [phone]    Marisa Rabago   Degree: DDS   [address] [place] [address]   Appointments: [phone]    Nabeel Bowles   Degree: HERBERT   [address] [place] [address]   Appointments: [phone]    Eddi Zaman   Degree: DDS   [address] [place] [address]  [place] [address]   Appointments: [phone]    Tom العلي   Degree: DDS   [facility], [address]   [address]   [place] [address]   Appointments: [phone]    Imelda Garcias   Degree: DDS  [address] [place] [address]  Appointments: [phone]    Andrew Carrell   [address] [place] [address]  Appointments: [phone]    Ninoska Martino   Degree: DDS   [facility]   [address] [place] [address]   Appointments: [phone]    Pepe Gann   Degree: DDS   [address] [place] [address]   Appointments: [phone]    Dilan Hung-   [facility]    Degree: DDS  [address]  [address]  [place] [address]  Appointments: [phone]    Henrique Womack   Degree: DDSTACY [facility]   [address]  [address]  [place] [address]  Appointment: [phone]    Taylor Girl   Degree: MS MAYTE, NARESH   [facility]  [address]   [address]  [place] [address]  Appointments: [phone]    Miguel Solo   Degree: DDS   [address] [place] [address]   Appointments: [phone]   Fax: [phone]        ACCEPT MEDICARE  Conor Rouse DDS  [address], [place] [address]  [phone]; [phone] (fax)  [employer]    Oni Nicole DDS, MS   [employer] Professional [place] [address] [facility]   [address]   [place] [address]   Appointments: [phone]   Fax: [phone]     Jose Garcia   Degree: CONSTANTIN LEVIN   [facility]   [address]  [address]  [place] [address]  Appointments: [phone]  Fax: [phone]    Manju Sanders BDS, MS(CR). MS (OFP)  Diplomate American Board of Orofacial Pain  [facility]  [address],   [place] [address]  Appointment: [phone]  Fax: [phone]        ADDITIONAL PROVIDERS    Kiko Araujo DDS    [facility]  [facility]   [facility]  [address] [address]  Appointments: [phone]     [facility]  [address] [facility]  [address]  [place] [address]  Appointments: [phone]  Fax- [phone]    Lotus Hackett   Degree: DDS   [facility]    [address]  [address]  [place] [address]  Appointments: [phone]        More detailed information  An oral appliance is a small acrylic device that fits over the upper and lower teeth  (similar to a retainer or a mouth guard). This device slightly moves jaw forward, which moves the base of the tongue forward, opens the airway, improves breathing for effective treat snoring and obstructive sleep apnea in perhaps 7 out of 10 people .  The best working devices are custom-made by a dental device  after a mold is made of the teeth 1, 2, 3.  When is an oral appliance indicated?  Oral appliance therapy is recommended as a first-line treatment for patients with primary snoring, mild sleep apnea, and for patients with moderate sleep apnea who prefer appliance therapy to use of CPAP4, 5. Severity of sleep apnea is determined by sleep testing and is based on the number of respiratory events per hour of sleep.   How successful is oral appliance therapy?  The success rate of oral appliance therapy in patients with mild sleep apnea is 75-80% while in patients with moderate sleep apnea it is 50-70%. The chance of success in patients with severe sleep apnea is 40-50%. The research also shows that oral appliances have a beneficial effect on the cardiovascular health of KAYLA patients at the same magnitude as CPAP therapy7.  Oral appliances should be a second-line treatment in cases of severe sleep apnea, but if not completely successful then a combination therapy utilizing CPAP plus oral appliance therapy may be effective. Oral appliances tend to be effective in a broad range of patients  although studies show that the patients who have the highest success are females, younger patients, those with milder disease, and less severe obesity. 3, 6.   Finding a dentist that practices dental sleep medicine  Specific training is available through the American Academy of Dental Sleep Medicine for dentists interested in working in the field of sleep. To find a dentist who is educated in the field of sleep and the use of oral appliances, near you, visit the Web site of the American Academy of Dental Sleep Medicine.    References  1. Rivas et al. Objectively measured vs self-reported compliance during oral appliance therapy for sleep-disordered breathing. Chest 2013; 144(5): 5690-5228.  2. Sharmin, et al. Objective measurement of compliance during oral appliance therapy for sleep-disordered breathing. Thorax 2013; 68(1): 91-96.  3. Sharlene et al. Mandibular advancement devices in 620 men and women with KAYLA and snoring: tolerability and predictors of treatment success. Chest 2004; 125: 5130-4697.  4. Lexis, et al. Oral appliances for snoring and KAYLA: a review. Sleep 2006; 29: 244-262.  5. Luan et al. Oral appliance treatment for KAYLA: an update. J Clin Sleep Med 2014; 10(2): 215-227.  6. Yasir et al. Predictors of OSAH treatment outcome. J Dent Res 2007; 86: 6599-2507.      Weight Loss:   Your Body mass index is 39.33 kg/m .    Being overweight does not necessarily mean you will have health consequences.  Those who have BMI over 30 or over 27 with existing medical conditions carries greater risk.   Weight loss decreases severity of sleep apnea in most people with obesity. For those with mild obesity who have developed snoring with weight gain, even 15-30 pound weight loss can improve and occasionally milder eliminate sleep apnea.  Structured and life-long dietary and health habits are necessary to lose weight and keep healthier weight levels.     The Comprehensive Weight loss program  offers all aspects of weight loss strategies including two Non-Surgical Weight Loss Programs: Medical Weight Management and our 24 Week Healthy Lifestyle Program:  Medical Weight Management: You will meet with a Medical Weight Management Provider, as well as a Registered Dietician. The program may include medication therapy, dietary education, recommended exercise and physical therapy programs, monthly support group meetings, and possible psychological counseling. Follow up visits with the provider or dietician are scheduled based on your progress and needs.  24 Week Healthy Lifestyle Program: This unique program is designed to give you the support of weekly appointments and activities thru a 24-week period. It may include all of the components of the basic program (above), with the addition of 11 individual Health  Visits, 24-week access to the [employer] website for over 700 online classes, and monthly support group meetings. This program has an out-of-pocket expense of $499 to cover the items that can not be billed to insurance (health coaches and [employer] access), and is non-refundable/non-transferable (you may be able to use a Health Savings Account; ask your HSA provider). There may be an optional meal replacement plan prescribed as well.   Medication therapy has been approved for the treatment of sleep apnea: The FDA approved tirzepatide (ZEPBOUND) for moderate to severe sleep apnea (apnea-hypopnea index greater than or equal to 15) in patients with BMI of greater than or equal to 30, or BMI greater than or equal to 27 with at least 1 weight-related condition such as hypertension or dyslipidemia.  Surgical management achieves meaningful long-term weight loss and improvement in health risks in most patients with more severe obesity.      Sleep Apnea Surgery:    Surgery for obstructive sleep apnea is considered generally only when other therapies fail to work. Surgery may be discussed with you if you are  having a difficult time tolerating CPAP and or when there is an abnormal structure that requires surgical correction.  Nose and throat surgeries often enlarge the airway to prevent collapse.  Most of these surgeries create pain for 1-2 weeks and up to half of the most common surgeries are not effective throughout life.  You should carefully discuss the benefits and drawbacks to surgery with your sleep provider and surgeon to determine if it is the best solution for you.   More information  Surgery for KAYLA is directed at areas that are responsible for narrowing or complete obstruction of the airway during sleep.  There are a wide range of procedures available to enlarge and/or stabilize the airway to prevent blockage of breathing in the three major areas where it can occur: the palate, tongue, and nasal regions.  Successful surgical treatment depends on the accurate identification of the factors responsible for obstructive sleep apnea in each person.  A personalized approach is required because there is no single treatment that works well for everyone.  Because of anatomic variation, consultation with an examination by a sleep surgeon is a critical first step in determining what surgical options are best for each patient.  In some cases, examination during sedation may be recommended in order to guide the selection of procedures.  Patients will be counseled about risks and benefits as well as the typical recovery course after surgery. Surgery is typically not a cure for a person s KAYLA.  However, surgery will often significantly improve one s KAYLA severity (termed  success rate ).  Even in the absence of a cure, surgery will decrease the cardiovascular risk associated with OSA7; improve overall quality of life8 (sleepiness, functionality, sleep quality, etc).      Palate Procedures:  Patients with KAYLA often have narrowing of their airway in the region of their tonsils and uvula.  The goals of palate procedures are to  widen the airway in this region as well as to help the tissues resist collapse.  Modern palate procedure techniques focus on tissue conservation and soft tissue rearrangement, rather than tissue removal.  Often the uvula is preserved in this procedure. Residual sleep apnea is common in patient after pharyngoplasty with an average reduction in sleep apnea events of 33%2.      Tongue Procedures:  ExamWhile patients are awake, the muscles that surround the throat are active and keep this region open for breathing. These muscles relax during sleep, allowing the tongue and other structures to collapse and block breathing.  There are several different tongue procedures available.  Selection of a tongue base procedure depends on characteristics seen on physical exam.  Generally, procedures are aimed at removing bulky tissues in this area or preventing the back of the tongue from falling back during sleep.  Success rates for tongue surgery range from 50-62%3.    Hypoglossal Nerve Stimulation:  Hypoglossal nerve stimulation has recently received approval from the [place] Food and Drug Administration for the treatment of obstructive sleep apnea.  This is based on research showing that the system was safe and effective in treating sleep apnea6.  Results showed that the median AHI score decreased 68%, from 29.3 to 9.0. This therapy uses an implant system that senses breathing patterns and delivers mild stimulation to airway muscles, which keeps the airway open during sleep.  The system consists of three fully implanted components: a small generator (similar in size to a pacemaker), a breathing sensor, and a stimulation lead.  Using a small handheld remote, a patient turns the therapy on before bed and off upon awakening.    Candidates for this device must be greater than 18 years of age, have moderate to severe obstructive sleep apnea with less than 25% central events  (AHI between 15-65), BMI less than 35, have tried  CPAP/oral appliance for at least 8 weeks without success, and have appropriate upper airway anatomy (determined by a sleep endoscopy performed by Dr. Kirk Nina or Dr. Familia Ospina).     Nasal Procedures:  Nasal obstruction can interfere with nasal breathing during the day and night.  Studies have shown that relief of nasal obstruction can improve the ability of some patients to tolerate positive airway pressure therapy for obstructive sleep apnea1.  Treatment options include medications such as nasal saline, topical corticosteroid and antihistamine sprays, and oral medications such as antihistamines or decongestants. Non-surgical treatments can include external nasal dilators for selected patients. If these are not successful by themselves, surgery can improve the nasal airway either alone or in combination with these other options.        Combination Procedures:  Combination of surgical procedures and other treatments may be recommended, particularly if patients have more than one area of narrowing or persistent positional disease.  The success rate of combination surgery ranges from 66-80%2,3.    References  Gaye DOMÍNGUEZ. The Role of the Nose in Snoring and Obstructive Sleep Apnoea: An Update.  Eur Arch Otorhinolaryngol. 2011; 268: 1365-73.   Edi SM; Miranda JA; Jayden JR; Pallanch JF; Marco Antonio MB; Julisa SG; Frederick AGRAWAL. Surgical modifications of the upper airway for obstructive sleep apnea in adults: a systematic review and meta-analysis. SLEEP 2010;33(10):5007-2437. Mackenzie CABA. Hypopharyngeal surgery in obstructive sleep apnea: an evidence-based medicine review.  Arch Otolaryngol Head Neck Surg. 2006 Feb;132(2):206-13.  Joseph YH1, Racheal Y, Raúl SARY. The efficacy of anatomically based multilevel surgery for obstructive sleep apnea. Otolaryngol Head Neck Surg. 2003 Oct;129(4):327-35.  Mackenzie CABA, Goldberg A. Hypopharyngeal Surgery in Obstructive Sleep Apnea: An Evidence-Based Medicine Review. Arch Otolaryngol  Head Neck Surg. 2006 Feb;132(2):206-13.  Axel PJ et al. Upper-Airway Stimulation for Obstructive Sleep Apnea.  N Engl J Med. 2014 Jan 9;370(2):139-49.  Susanne Y et al. Increased Incidence of Cardiovascular Disease in Middle-aged Men with Obstructive Sleep Apnea. Am J Respir Crit Care Med; 2002 166: 159-165  Santiago EM et al. Studying Life Effects and Effectiveness of Palatopharyngoplasty (SLEEP) study: Subjective Outcomes of Isolated Uvulopalatopharyngoplasty. Otolaryngol Head Neck Surg. 2011; 144: 623-631.        WHAT IF I ONLY HAVE SNORING?    Mandibular advancement devices, lateral sleep positioning, long-term weight loss and treatment of nasal allergies have been shown to improve snoring.  Exercising tongue muscles with a game (https://[employer]/sabrina/Vine-reduce-snoring/tk7901148980) or stimulating the tongue during the day with a device (https://doi.org/10.3390/goo68051189) have improved snoring in some individuals.  https://www.[employer]/  https://www.sleepfoundation.org/best-anti-snoring-mouthpieces-and-mouthguards    Remember to Drive Safe... Drive Alive     Sleep health profoundly affects your health, mood, and your safety.  Thirty three percent of the population (one in three of us) is not getting enough sleep and many have a sleep disorder. Not getting enough sleep or having an untreated / undertreated sleep condition may make us sleepy without even knowing it. In fact, our driving could be dramatically impaired due to our sleep health. As your provider, here are some things I would like you to know about driving:     Here are some warning signs for impairment and dangerous drowsy driving:              -Having been awake more than 16 hours               -Looking tired               -Eyelid drooping              -Head nodding (it could be too late at this point)              -Driving for more than 30 minutes     Some things you could do to make the driving safer if you are experiencing some  drowsiness:              -Stop driving and rest              -Call for transportation              -Make sure your sleep disorder is adequately treated     Some things that have been shown NOT to work when experiencing drowsiness while driving:              -Turning on the radio              -Opening windows              -Eating any  distracting  /  entertaining  foods (e.g., sunflower seeds, candy, or any other)              -Talking on the phone      Your decision may not only impact your life, but also the life of others. Please, remember to drive safe for yourself and all of us.

## 2025-08-25 PROBLEM — E66.01 MORBID OBESITY (H): Status: ACTIVE | Noted: 2025-08-25

## (undated) DEVICE — ENDO FCP GRASPING ROTATABLE 7.2MMX2.6MM FG-244NR

## (undated) DEVICE — SOL WATER IRRIG 1000ML BOTTLE 2F7114

## (undated) DEVICE — ENDO BITE BLOCK ADULT OMNI-BLOC

## (undated) DEVICE — ENDO TUBING CO2 SMARTCAP STERILE DISP 100145CO2EXT

## (undated) DEVICE — SUCTION MANIFOLD NEPTUNE 2 SYS 4 PORT 0702-020-000

## (undated) DEVICE — KIT CONNECTOR FOR OLYMPUS ENDOSCOPES DEFENDO 100310

## (undated) DEVICE — KIT ENDO FIRST STEP DISINFECTANT 200ML W/POUCH EP-4

## (undated) DEVICE — INTR ENDOSCOPIC STENT FUSION OASIS 09.0FRX200CM

## (undated) DEVICE — ENDO CAP AND TUBING STERILE FOR ENDOGATOR  100130

## (undated) DEVICE — PACK ENDOSCOPY GI CUSTOM UMMC

## (undated) DEVICE — GLOVE BIOGEL PI ULTRATOUCH SZ 8.0 41180

## (undated) DEVICE — WIRE GUIDE 0.025"X270CM ANG VISIGLIDE G-240-2527A

## (undated) DEVICE — PAD CHUX UNDERPAD 23X24" 7136

## (undated) DEVICE — ENDO PROBE COVER ULTRASOUND BALLOON LATEX  MAJ-249

## (undated) RX ORDER — DEXAMETHASONE SODIUM PHOSPHATE 4 MG/ML
INJECTION, SOLUTION INTRA-ARTICULAR; INTRALESIONAL; INTRAMUSCULAR; INTRAVENOUS; SOFT TISSUE
Status: DISPENSED
Start: 2024-07-18

## (undated) RX ORDER — FENTANYL CITRATE 50 UG/ML
INJECTION, SOLUTION INTRAMUSCULAR; INTRAVENOUS
Status: DISPENSED
Start: 2024-07-18

## (undated) RX ORDER — FENTANYL CITRATE-0.9 % NACL/PF 10 MCG/ML
PLASTIC BAG, INJECTION (ML) INTRAVENOUS
Status: DISPENSED
Start: 2024-07-18

## (undated) RX ORDER — EPHEDRINE SULFATE 50 MG/ML
INJECTION, SOLUTION INTRAMUSCULAR; INTRAVENOUS; SUBCUTANEOUS
Status: DISPENSED
Start: 2024-07-18

## (undated) RX ORDER — ONDANSETRON 2 MG/ML
INJECTION INTRAMUSCULAR; INTRAVENOUS
Status: DISPENSED
Start: 2024-07-18

## (undated) RX ORDER — EPHEDRINE SULFATE 50 MG/ML
INJECTION, SOLUTION INTRAMUSCULAR; INTRAVENOUS; SUBCUTANEOUS
Status: DISPENSED
Start: 2024-07-25

## (undated) RX ORDER — PROPOFOL 10 MG/ML
INJECTION, EMULSION INTRAVENOUS
Status: DISPENSED
Start: 2024-07-18

## (undated) RX ORDER — FENTANYL CITRATE 50 UG/ML
INJECTION, SOLUTION INTRAMUSCULAR; INTRAVENOUS
Status: DISPENSED
Start: 2024-07-25